# Patient Record
Sex: FEMALE | Race: WHITE | NOT HISPANIC OR LATINO | Employment: OTHER | URBAN - METROPOLITAN AREA
[De-identification: names, ages, dates, MRNs, and addresses within clinical notes are randomized per-mention and may not be internally consistent; named-entity substitution may affect disease eponyms.]

---

## 2017-01-19 PROBLEM — D64.9 ANEMIA: Status: ACTIVE | Noted: 2017-01-19

## 2017-01-24 ENCOUNTER — LAB VISIT (OUTPATIENT)
Dept: LAB | Facility: HOSPITAL | Age: 67
End: 2017-01-24
Attending: INTERNAL MEDICINE
Payer: MEDICARE

## 2017-01-24 DIAGNOSIS — D86.9 SARCOIDOSIS: Chronic | ICD-10-CM

## 2017-01-24 LAB
ALBUMIN SERPL BCP-MCNC: 3.7 G/DL
ALP SERPL-CCNC: 91 U/L
ALT SERPL W/O P-5'-P-CCNC: 16 U/L
ANION GAP SERPL CALC-SCNC: 12 MMOL/L
AST SERPL-CCNC: 19 U/L
BASOPHILS # BLD AUTO: 0.03 K/UL
BASOPHILS NFR BLD: 0.4 %
BILIRUB SERPL-MCNC: 0.9 MG/DL
BUN SERPL-MCNC: 25 MG/DL
CALCIUM SERPL-MCNC: 9.8 MG/DL
CHLORIDE SERPL-SCNC: 105 MMOL/L
CO2 SERPL-SCNC: 28 MMOL/L
CREAT SERPL-MCNC: 0.9 MG/DL
CRP SERPL-MCNC: 22.3 MG/L
DIFFERENTIAL METHOD: ABNORMAL
EOSINOPHIL # BLD AUTO: 0.2 K/UL
EOSINOPHIL NFR BLD: 3.4 %
ERYTHROCYTE [DISTWIDTH] IN BLOOD BY AUTOMATED COUNT: 14.4 %
ERYTHROCYTE [SEDIMENTATION RATE] IN BLOOD BY WESTERGREN METHOD: 24 MM/HR
EST. GFR  (AFRICAN AMERICAN): >60 ML/MIN/1.73 M^2
EST. GFR  (NON AFRICAN AMERICAN): >60 ML/MIN/1.73 M^2
GLUCOSE SERPL-MCNC: 98 MG/DL
HCT VFR BLD AUTO: 33.9 %
HGB BLD-MCNC: 11.5 G/DL
LYMPHOCYTES # BLD AUTO: 0.9 K/UL
LYMPHOCYTES NFR BLD: 13.2 %
MCH RBC QN AUTO: 33.1 PG
MCHC RBC AUTO-ENTMCNC: 33.9 %
MCV RBC AUTO: 98 FL
MONOCYTES # BLD AUTO: 0.5 K/UL
MONOCYTES NFR BLD: 7.2 %
NEUTROPHILS # BLD AUTO: 5.2 K/UL
NEUTROPHILS NFR BLD: 75.8 %
PLATELET # BLD AUTO: 194 K/UL
PMV BLD AUTO: 8.8 FL
POTASSIUM SERPL-SCNC: 3.8 MMOL/L
PROT SERPL-MCNC: 6.8 G/DL
RBC # BLD AUTO: 3.47 M/UL
SODIUM SERPL-SCNC: 145 MMOL/L
WBC # BLD AUTO: 6.84 K/UL

## 2017-01-24 PROCEDURE — 86140 C-REACTIVE PROTEIN: CPT

## 2017-01-24 PROCEDURE — 36415 COLL VENOUS BLD VENIPUNCTURE: CPT | Mod: PO

## 2017-01-24 PROCEDURE — 80053 COMPREHEN METABOLIC PANEL: CPT | Mod: PO

## 2017-01-24 PROCEDURE — 82164 ANGIOTENSIN I ENZYME TEST: CPT

## 2017-01-24 PROCEDURE — 85651 RBC SED RATE NONAUTOMATED: CPT | Mod: PO

## 2017-01-24 PROCEDURE — 85025 COMPLETE CBC W/AUTO DIFF WBC: CPT | Mod: PO

## 2017-01-25 LAB — ACE SERPL-CCNC: 62 U/L

## 2017-01-26 RX ORDER — PREDNISONE 5 MG/1
TABLET ORAL
Qty: 120 TABLET | Refills: 3 | Status: SHIPPED | OUTPATIENT
Start: 2017-01-26 | End: 2017-01-31 | Stop reason: SDUPTHER

## 2017-01-31 ENCOUNTER — OFFICE VISIT (OUTPATIENT)
Dept: RHEUMATOLOGY | Facility: CLINIC | Age: 67
End: 2017-01-31
Payer: MEDICARE

## 2017-01-31 VITALS
HEIGHT: 64 IN | HEART RATE: 77 BPM | BODY MASS INDEX: 35.64 KG/M2 | WEIGHT: 208.75 LBS | SYSTOLIC BLOOD PRESSURE: 131 MMHG | DIASTOLIC BLOOD PRESSURE: 70 MMHG

## 2017-01-31 DIAGNOSIS — Z79.52 LONG TERM CURRENT USE OF SYSTEMIC STEROIDS: Chronic | ICD-10-CM

## 2017-01-31 DIAGNOSIS — D84.821 IMMUNOCOMPROMISED DUE TO CORTICOSTEROIDS: Chronic | ICD-10-CM

## 2017-01-31 DIAGNOSIS — T38.0X5A IMMUNOCOMPROMISED DUE TO CORTICOSTEROIDS: Chronic | ICD-10-CM

## 2017-01-31 DIAGNOSIS — Z79.52 IMMUNOCOMPROMISED DUE TO CORTICOSTEROIDS: Chronic | ICD-10-CM

## 2017-01-31 DIAGNOSIS — M15.9 PRIMARY OSTEOARTHRITIS INVOLVING MULTIPLE JOINTS: ICD-10-CM

## 2017-01-31 DIAGNOSIS — D86.9 SARCOID: Primary | Chronic | ICD-10-CM

## 2017-01-31 DIAGNOSIS — M81.0 OSTEOPOROSIS, POSTMENOPAUSAL: Chronic | ICD-10-CM

## 2017-01-31 PROCEDURE — 99214 OFFICE O/P EST MOD 30 MIN: CPT | Mod: S$GLB,,, | Performed by: PHYSICIAN ASSISTANT

## 2017-01-31 PROCEDURE — 99999 PR PBB SHADOW E&M-EST. PATIENT-LVL IV: CPT | Mod: PBBFAC,,, | Performed by: PHYSICIAN ASSISTANT

## 2017-01-31 PROCEDURE — 96372 THER/PROPH/DIAG INJ SC/IM: CPT | Mod: S$GLB,,, | Performed by: INTERNAL MEDICINE

## 2017-01-31 RX ORDER — HYDROXYCHLOROQUINE SULFATE 200 MG/1
200 TABLET, FILM COATED ORAL 2 TIMES DAILY
Qty: 60 TABLET | Refills: 4 | Status: SHIPPED | OUTPATIENT
Start: 2017-01-31 | End: 2017-06-15 | Stop reason: SDUPTHER

## 2017-01-31 RX ORDER — DOXYCYCLINE 100 MG/1
100 CAPSULE ORAL EVERY 12 HOURS
Qty: 60 CAPSULE | Refills: 6 | Status: SHIPPED | OUTPATIENT
Start: 2017-01-31 | End: 2017-06-15

## 2017-01-31 RX ORDER — METHOTREXATE 2.5 MG/1
20 TABLET ORAL
Qty: 32 TABLET | Refills: 6 | Status: SHIPPED | OUTPATIENT
Start: 2017-01-31 | End: 2017-06-15

## 2017-01-31 NOTE — PROGRESS NOTES
Subjective:       Patient ID: Cori Avalos is a 66 y.o. female.    Chief Complaint: Sarcoidosis    HPI Comments: Cori is back today for rheumatology follow-up.  She has sarcoidosis with mild lung and cutaneous involvement (DX by skin biopsy initially). In 2013 she had a sarcoidosis flare up with elevated inflammatory parameters, elevated ACE level increase joint pain and swelling as well as increased nodules in her skin. mtx was started and she did better. Her dose was stable at 15 mg weekly. She was also on Plaquenil.  She went for evaluation last year at [facility] to see if there was anything else new or different for sarcoidosis treatment.  They discontinued her Plaquenil and increase her methotrexate to 20 mg once weekly and added minocycline 100 mg twice a day.  Minocycline causes dizziness she was intolerant of this.  She resumed Plaquenil but the dose of 100 mg twice daily.  Reports still having nodules under her skin that are popping up here and there.  Pain level 2/10 lower back only.  Had left knee replacement about 2 months ago.  Knee healing well.  She does get some intermittent swelling.  Had full cardiology clearance prior to surgery.  She does see pulmonology has obstructive sleep apnea on CPAP.  Reports a little bit more shortness of breath since the surgery.  She doesn't have any issues with lower extremity edema, no paroxysmal nocturnal dyspnea, no syncopal nurse couple episodes.  She doesn't have a productive cough.       Osteoporosis fractures in the past, failed bisphosphonate's, treated forteo X 24 months. Now on PROLIA every 6 months since 2013. Prolia due today, no issues with prolia in the past. Repeat dexa 5/21/15 falling bmd at spine. Since last visit fall sustained right distal radial fx and L4 compression fx requiring kyphoplasty. Seeing pain management. On oxycodone prn for pain.         Sarcoidosis   Associated symptoms include fatigue, a rash and weakness. Pertinent negatives  "include no abdominal pain, arthralgias, chest pain, chills, fever, joint swelling, myalgias, nausea, neck pain or vomiting.     Review of Systems   Constitutional: Positive for fatigue. Negative for activity change, appetite change, chills and fever.   HENT: Negative.  Negative for mouth sores and trouble swallowing.         No dry mouth   Eyes: Negative.  Negative for photophobia, pain and redness.        No swollen or red eyes, no dry eye     Respiratory: Positive for shortness of breath. Negative for chest tightness, wheezing and stridor.    Cardiovascular: Negative.  Negative for chest pain.   Gastrointestinal: Negative.  Negative for abdominal pain, blood in stool, diarrhea, nausea and vomiting.   Genitourinary: Negative.  Negative for dysuria, frequency, hematuria and urgency.   Musculoskeletal: Positive for back pain and gait problem. Negative for arthralgias, joint swelling, myalgias, neck pain and neck stiffness.   Skin: Positive for rash. Negative for color change and pallor.   Neurological: Positive for dizziness and weakness.   Hematological: Negative for adenopathy.   Psychiatric/Behavioral: Negative for suicidal ideas.         Objective:     Visit Vitals    /70    Pulse 77    Ht 5' 4" (1.626 m)    Wt 94.7 kg (208 lb 12.4 oz)    BMI 35.84 kg/m2        Physical Exam   Constitutional: She is oriented to person, place, and time and well-developed, well-nourished, and in no distress. No distress.   HENT:   Head: Normocephalic and atraumatic.   Right Ear: External ear normal.   Left Ear: External ear normal.   Mouth/Throat: No oropharyngeal exudate.   Eyes: Conjunctivae and EOM are normal. Pupils are equal, round, and reactive to light. No scleral icterus.   Neck: Normal range of motion. Neck supple. No thyromegaly present.   Cardiovascular: Normal rate, regular rhythm and normal heart sounds.    No murmur heard.  Pulmonary/Chest: Effort normal and breath sounds normal. She exhibits no tenderness. "   Abdominal: Soft. Bowel sounds are normal.       Right Side Rheumatological Exam     Examination finds the shoulder, elbow, wrist, knee, 1st PIP, 1st MCP, 2nd PIP, 2nd MCP, 3rd PIP, 3rd MCP, 4th PIP, 4th MCP, 5th PIP and 5th MCP normal.    Muscle Strength (0-5 scale):  Deltoid:  5  Biceps: 5/5   Triceps:  5  : 5/5   Iliopsoas: 4.6  Quadriceps:  4.8   Distal Lower Extremity: 4.8    Left Side Rheumatological Exam     Examination finds the shoulder, elbow, wrist, 1st PIP, 1st MCP, 2nd PIP, 3rd PIP, 3rd MCP, 4th PIP, 4th MCP, 5th PIP and 5th MCP normal.    The patient is tender to palpation of the knee.    She has swelling of the knee    Muscle Strength (0-5 scale):  Deltoid:  5  Biceps: 5/5   Triceps:  5  :  5/5   Iliopsoas: 4.2  Quadriceps:  4.8   Distal Lower Extremity: 4.8      Lymphadenopathy:     She has no cervical adenopathy.   Neurological: She is alert and oriented to person, place, and time. She displays normal reflexes. No cranial nerve deficit. She exhibits normal muscle tone. Gait normal.   Skin: Skin is warm and dry. No rash noted.     Multiple subcutaneous nodules on her skin bilateral arms, abdomen and legs   Musculoskeletal: Normal range of motion. She exhibits edema and tenderness.                  Recent Results (from the past 336 hour(s))   Comprehensive metabolic panel    Collection Time: 01/19/17 10:20 AM   Result Value Ref Range    Sodium 142 136 - 145 mmol/L    Potassium 3.5 3.5 - 5.1 mmol/L    Chloride 100 95 - 110 mmol/L    CO2 32 (H) 22 - 31 mmol/L    Glucose 88 70 - 110 mg/dL    BUN, Bld 14 7 - 18 mg/dL    Creatinine 0.88 0.50 - 1.40 mg/dL    Calcium 9.8 8.4 - 10.2 mg/dL    Total Protein 6.9 6.0 - 8.4 g/dL    Albumin 4.1 3.5 - 5.2 g/dL    Total Bilirubin 0.9 0.2 - 1.3 mg/dL    Alkaline Phosphatase 92 38 - 145 U/L    AST 28 14 - 36 U/L    ALT 36 10 - 44 U/L    Anion Gap 10 8 - 16 mmol/L    eGFR if African American >60 >60 mL/min/1.73 m^2    eGFR if non African American >60 >60  mL/min/1.73 m^2   CBC auto differential    Collection Time: 01/19/17 10:20 AM   Result Value Ref Range    WBC 6.04 3.90 - 12.70 K/uL    RBC 3.46 (L) 4.00 - 5.40 M/uL    Hemoglobin 11.2 (L) 12.0 - 16.0 g/dL    Hematocrit 35.4 (L) 37.0 - 48.5 %     (H) 82 - 98 fL    MCH 32.4 (H) 27.0 - 31.0 pg    MCHC 31.6 (L) 32.0 - 36.0 %    RDW 13.9 11.5 - 14.5 %    Platelets 192 150 - 350 K/uL    MPV 10.1 9.2 - 12.9 fL    Gran # 3.7 1.8 - 7.7 K/uL    Lymph # 1.6 1.0 - 4.8 K/uL    Mono # 0.5 0.3 - 1.0 K/uL    Eos # 0.2 0.0 - 0.5 K/uL    Baso # 0.03 0.00 - 0.20 K/uL    nRBC 0 0 /100 WBC    Gran% 61.1 38.0 - 73.0 %    Lymph% 26.5 18.0 - 48.0 %    Mono% 8.1 4.0 - 15.0 %    Eosinophil% 3.8 0.0 - 8.0 %    Basophil% 0.5 0.0 - 1.9 %    Differential Method Automated    Iron and TIBC    Collection Time: 01/19/17 10:20 AM   Result Value Ref Range    Iron 86 30 - 160 ug/dL    TIBC 361 265 - 497 ug/dL    Iron Saturation 24 20 - 50 %   Vitamin B12    Collection Time: 01/19/17 10:20 AM   Result Value Ref Range    Vitamin B-12 682 210 - 950 pg/mL   Urine culture    Collection Time: 01/19/17 10:39 AM   Result Value Ref Range    Urine Culture, Routine       Multiple organisms isolated. None in predominance.  Repeat if    Urine Culture, Routine clinically necessary.    CBC auto differential    Collection Time: 01/24/17 10:41 AM   Result Value Ref Range    WBC 6.84 3.90 - 12.70 K/uL    RBC 3.47 (L) 4.00 - 5.40 M/uL    Hemoglobin 11.5 (L) 12.0 - 16.0 g/dL    Hematocrit 33.9 (L) 37.0 - 48.5 %    MCV 98 82 - 98 fL    MCH 33.1 (H) 27.0 - 31.0 pg    MCHC 33.9 32.0 - 36.0 %    RDW 14.4 11.5 - 14.5 %    Platelets 194 150 - 350 K/uL    MPV 8.8 (L) 9.2 - 12.9 fL    Gran # 5.2 1.8 - 7.7 K/uL    Lymph # 0.9 (L) 1.0 - 4.8 K/uL    Mono # 0.5 0.3 - 1.0 K/uL    Eos # 0.2 0.0 - 0.5 K/uL    Baso # 0.03 0.00 - 0.20 K/uL    Gran% 75.8 (H) 38.0 - 73.0 %    Lymph% 13.2 (L) 18.0 - 48.0 %    Mono% 7.2 4.0 - 15.0 %    Eosinophil% 3.4 0.0 - 8.0 %    Basophil% 0.4 0.0  - 1.9 %    Differential Method Automated    Comprehensive metabolic panel    Collection Time: 01/24/17 10:41 AM   Result Value Ref Range    Sodium 145 136 - 145 mmol/L    Potassium 3.8 3.5 - 5.1 mmol/L    Chloride 105 95 - 110 mmol/L    CO2 28 23 - 29 mmol/L    Glucose 98 70 - 110 mg/dL    BUN, Bld 25 (H) 8 - 23 mg/dL    Creatinine 0.9 0.5 - 1.4 mg/dL    Calcium 9.8 8.7 - 10.5 mg/dL    Total Protein 6.8 6.0 - 8.4 g/dL    Albumin 3.7 3.5 - 5.2 g/dL    Total Bilirubin 0.9 0.1 - 1.0 mg/dL    Alkaline Phosphatase 91 55 - 135 U/L    AST 19 10 - 40 U/L    ALT 16 10 - 44 U/L    Anion Gap 12 8 - 16 mmol/L    eGFR if African American >60 >60 mL/min/1.73 m^2    eGFR if non African American >60 >60 mL/min/1.73 m^2   C-reactive protein    Collection Time: 01/24/17 10:41 AM   Result Value Ref Range    CRP 22.3 (H) 0.0 - 8.2 mg/L   Sedimentation rate, manual    Collection Time: 01/24/17 10:41 AM   Result Value Ref Range    Sed Rate 24 (H) 0 - 20 mm/Hr   Angiotensin converting enzyme    Collection Time: 01/24/17 10:41 AM   Result Value Ref Range    Angio Convert Enzyme 62 (H) 8 - 53 U/L         Assessment:       1. Sarcoid    2. Osteoporosis, postmenopausal    3. Long term current use of systemic steroids    4. Immunocompromised due to corticosteroids    5. Primary osteoarthritis involving multiple joints        1.  Sarcoidosis with pulmonary and skin involvement predominantly on methotrexate 20 mg once weekly and Plaquenil 100 mg twice daily ongoing skin nodules -  2.  Status post total left knee arthroscopy doing well  3.  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  Long term steroid use and osteoporosis currently on treatment DEXA up-to-date  7.  Medication monitoring--no toxicity issues with her medication  8.  Chronic immunosuppression-no issues with recurrent infections      Plan:       Keep her prednisone at 5 mg daily for now hold off  cutting the dose back    continue her methotrexate 20 mg once weekly, splitting the dose 4 tablets in the morning and 4 tablets in the evening  Continue her folic acid  Always remember to Hold methotrexate  for signs of infection or for surgery     Try to help increasing nodules we will Increase her Plaquenil to  200 mg bid   Since she was intolerant of minocycline if nodules not better with increased plaquenil consider doxy 100 mg bid in place of minocycine    Okay for Prolia today, continues to 6 months repeat her bone density late summer    Follow-up with her pulmonologist    Return to clinic in 3-4 month intervals with labs including an Ace at next visit we'll check a calcitriol vitamin D ratio as well    Call with any questions, changes, or concerns

## 2017-01-31 NOTE — MR AVS SNAPSHOT
[facility]  [address] [facility] [address] [place] [address]  Phone: [phone]  Fax: [phone]                  Cori Avalos   2017 2:30 PM   Office Visit    Description:  Female : 1950   Provider:  Ariana Quigley PA-C   Department:  [facility] - Rheumatology           Reason for Visit     Sarcoidosis           Diagnoses this Visit        Comments    Sarcoid    -  Primary     Osteoporosis, postmenopausal         Long term current use of systemic steroids         Immunocompromised due to corticosteroids         Primary osteoarthritis involving multiple joints                To Do List           Future Appointments        Provider Department Dept Phone    2017 11:30 AM LAB, [place] [facility] [phone]    2017 10:30 AM Ariana Quigley PA-C [facility] [phone]    2017 10:40 AM LAB, [place] [facility] [phone]    2017 11:00 AM Keith Balderrama MD [facility] [phone]      Goals (5 Years of Data)     None       These Medications        Disp Refills Start End    methotrexate 2.5 MG Tab 32 tablet 6 2017    Take 8 tablets (20 mg total) by mouth every 7 days. - Oral    Pharmacy: [facility] [address] Ph #: [phone]       doxycycline (VIBRAMYCIN) 100 MG Cap 60 capsule 6 2017     Take 1 capsule (100 mg total) by mouth every 12 (twelve) hours. - Oral    Pharmacy: [facility] [address] Ph #: [phone]       hydroxychloroquine (PLAQUENIL) 200 mg tablet 60 tablet 4 2017     Take 1 tablet (200 mg total) by mouth 2 (two) times daily. - Oral    Pharmacy: [facility] [address] Ph #: [phone]         [facility]     [facility] Nurse Care Line -  Assistance  Registered nurses in the [facility] provide clinical advisement,  health education, appointment booking, and other advisory services.  Call for this free service at [phone].             Medications           Message regarding Medications     Verify the changes and/or additions to your medication regime listed below are the same as discussed with your clinician today.  If any of these changes or additions are incorrect, please notify your healthcare provider.        START taking these NEW medications        Refills    methotrexate 2.5 MG Tab 6    Sig: Take 8 tablets (20 mg total) by mouth every 7 days.    Class: Normal    Route: Oral    doxycycline (VIBRAMYCIN) 100 MG Cap 6    Sig: Take 1 capsule (100 mg total) by mouth every 12 (twelve) hours.    Class: Normal    Route: Oral    hydroxychloroquine (PLAQUENIL) 200 mg tablet 4    Sig: Take 1 tablet (200 mg total) by mouth 2 (two) times daily.    Class: Normal    Route: Oral      STOP taking these medications     ropinirole (REQUIP) 5 MG tablet Take 5 mg by mouth every evening.    doxycycline (VIBRA-TABS) 100 MG tablet Take 1 tablet (100 mg total) by mouth 2 (two) times daily.           Verify that the below list of medications is an accurate representation of the medications you are currently taking.  If none reported, the list may be blank. If incorrect, please contact your healthcare provider. Carry this list with you in case of emergency.           Current Medications     amitriptyline (ELAVIL) 10 MG tablet Take 10 mg by mouth every evening.     DENOSUMAB (PROLIA SUBQ) Inject 60 mg into the skin As instructed (every 6 months).     dextromethorphan-guaifenesin  mg (MUCINEX DM)  mg per 12 hr tablet Take 1 tablet by mouth 2 (two) times daily as needed.    diazepam (VALIUM) 10 MG Tab Take 10 mg by mouth every evening.    duloxetine (CYMBALTA) 60 MG capsule Take 60 mg by mouth once daily.    esomeprazole (NEXIUM) 40 MG capsule Take 40 mg by mouth before breakfast.    flunisolide 25 mcg, 0.025%, (NASALIDE) 25 mcg  "(0.025 %) Spry 2 sprays by Nasal route once daily.    furosemide (LASIX) 20 MG tablet Take 20 mg by mouth once daily.    hydrocodone-acetaminophen 7.5-325mg (NORCO) 7.5-325 mg per tablet Take 1 tablet by mouth every 4 (four) hours as needed.    me-thfolate gluc-B12-herb #236 (RHEUMATE) 1-1-500 mg Cap Take 1 tablet by mouth once daily.    metoprolol succinate (TOPROL-XL) 100 MG 24 hr tablet Take 100 mg by mouth 2 (two) times daily.     potassium chloride (KLOR-CON) 10 MEQ TbSR Take 20 mEq by mouth once daily.     predniSONE (DELTASONE) 5 MG tablet Take 5 mg by mouth once daily.    ropinirole (REQUIP XL) 4 mg 24 hr tablet TAKE 1 TABLET BY MOUTH ONCE DAILY AT BEDTIME.    tizanidine (ZANAFLEX) 4 MG tablet Take 4 mg by mouth every evening.     warfarin (COUMADIN) 2.5 MG tablet Take 2.5 mg by mouth every Tues, Thurs, Sat.     warfarin (COUMADIN) 5 MG tablet Take 5 mg by mouth every Mon, Wed, Fri.     doxycycline (VIBRAMYCIN) 100 MG Cap Take 1 capsule (100 mg total) by mouth every 12 (twelve) hours.    hydroxychloroquine (PLAQUENIL) 200 mg tablet Take 1 tablet (200 mg total) by mouth 2 (two) times daily.    methotrexate 2.5 MG Tab Take 8 tablets (20 mg total) by mouth every 7 days.           Clinical Reference Information           Vital Signs - Last Recorded  Most recent update: 1/31/2017  2:41 PM by Calista Sam LPN    BP Pulse Ht Wt BMI    131/70 77 5' 4" (1.626 m) 94.7 kg (208 lb 12.4 oz) 35.84 kg/m2      Blood Pressure          Most Recent Value    BP  131/70      Allergies as of 1/31/2017     Neurontin [Gabapentin]    Penicillins    Ceftin [Cefuroxime Axetil]    Dilaudid [Hydromorphone]    Latex, Natural Rubber    Neurontin [Gabapentin]    Ceftin [Cefuroxime Axetil]    Penicillins      Immunizations Administered on Date of Encounter - 1/31/2017     None      Orders Placed During Today's Visit     Recurring Lab Work Interval Expires    C-reactive protein   1/31/2018    CBC auto differential   1/31/2018    " Comprehensive metabolic panel   1/31/2018    Sedimentation rate, manual   1/31/2018    Angiotensin converting enzyme   4/1/2018    Calcitriol   4/1/2018    Urinalysis   4/1/2018    Vitamin D   4/1/2018

## 2017-01-31 NOTE — PROGRESS NOTES
Prolia 60mg/cc to right lower abdomen. Labs checked: Calcium 9.8, Creatinine 0.9. Pt tolerated well. No acute reaction noted to site. Pt instructed on signs and symptoms of reaction to report. Pt verbalized understanding.     Lot:  [id number]  Exp:  4/19

## 2017-02-01 ENCOUNTER — TELEPHONE (OUTPATIENT)
Dept: RHEUMATOLOGY | Facility: CLINIC | Age: 67
End: 2017-02-01

## 2017-02-01 NOTE — TELEPHONE ENCOUNTER
Ariana, pt called and wanted to clarify that she is taking plaquenil bid. So do you want her to start the doxi ? And she said she was getting sarcoid nodules on her spine

## 2017-02-01 NOTE — TELEPHONE ENCOUNTER
What i need clarification is she was on plaquenil 100 mg bid  i changed to 200 mg bid  So as long as she was taking the 100 mg plaquenil then she had a dose adjustment and i would hold off starting the doxy just yet    She was pretty sure she was taking the lower dose plaquenil  Please clarify

## 2017-02-01 NOTE — TELEPHONE ENCOUNTER
----- Message from Lianna Zapata sent at 2/1/2017  8:41 AM CST -----  Contact: pt  Pt calling to speak to nurse....states that she made a mistake when advising of current medication she is on...the patient would like to speak to nurse to get this corrected...please adv/call pt back at [phone]///thx jw

## 2017-02-02 NOTE — TELEPHONE ENCOUNTER
----- Message from Caridad Lopez sent at 2/2/2017  8:26 AM CST -----  Contact: pt  Pt requests nurse to call her regarding medication change. Pt states she left a message yesterday and no one called her back.pt can be reached at [phone].

## 2017-02-09 ENCOUNTER — TELEPHONE (OUTPATIENT)
Dept: RHEUMATOLOGY | Facility: CLINIC | Age: 67
End: 2017-02-09

## 2017-02-09 NOTE — TELEPHONE ENCOUNTER
Spoke with Ms. Cori who states that the Doxycycline is not working due to new nodules appearing on Bilat upper lower extremities, right arm and down her spine.  Pain rate 4/10 only on the right arm. Should she give it some time to work? Please advise.

## 2017-02-09 NOTE — TELEPHONE ENCOUNTER
----- Message from Sole Webster sent at 2/9/2017 11:36 AM CST -----  Contact: pt  Pt states that the new medication is not working...[phone]

## 2017-02-09 NOTE — TELEPHONE ENCOUNTER
Spoke with MsSangeetha Cori and informed her of Ariana Quigley PA-C advisement below. Ms. Persaud states understanding.

## 2017-02-09 NOTE — TELEPHONE ENCOUNTER
We just started the med 1 week ago  Have to give it some time does not work quickly  - take for next 8-12 weeks and will see how doing at next visit in April

## 2017-02-16 DIAGNOSIS — D86.9 SARCOIDOSIS: Chronic | ICD-10-CM

## 2017-02-16 RX ORDER — HYDROXYCHLOROQUINE SULFATE 200 MG/1
TABLET, FILM COATED ORAL
Qty: 60 TABLET | Refills: 4 | Status: SHIPPED | OUTPATIENT
Start: 2017-02-16 | End: 2017-03-29 | Stop reason: SDUPTHER

## 2017-03-16 ENCOUNTER — TELEPHONE (OUTPATIENT)
Dept: RHEUMATOLOGY | Facility: CLINIC | Age: 67
End: 2017-03-16

## 2017-03-16 NOTE — TELEPHONE ENCOUNTER
Would want to see her on a day dr knott here     i am not sure what else to do  Can keep apt with me 4/25 or      see me or luis earlier on day dr knott is here

## 2017-03-16 NOTE — TELEPHONE ENCOUNTER
Ariana, pt called and wants you to know that she is getting more sarcoid lumps. Her next appt is 4/25. Do you want me to move it up

## 2017-03-16 NOTE — TELEPHONE ENCOUNTER
----- Message from Lianna Clark sent at 3/16/2017  1:30 PM CDT -----  Contact: Pt  Pt is requesting to speak to the nurse. Pt states that she has more of the sarcoid lumps. Pt can be reached at [phone].

## 2017-03-17 ENCOUNTER — LAB VISIT (OUTPATIENT)
Dept: LAB | Facility: HOSPITAL | Age: 67
End: 2017-03-17
Attending: INTERNAL MEDICINE
Payer: MEDICARE

## 2017-03-17 DIAGNOSIS — M81.0 OSTEOPOROSIS, POSTMENOPAUSAL: Chronic | ICD-10-CM

## 2017-03-17 DIAGNOSIS — D86.9 SARCOID: Chronic | ICD-10-CM

## 2017-03-17 DIAGNOSIS — Z79.52 LONG TERM CURRENT USE OF SYSTEMIC STEROIDS: Chronic | ICD-10-CM

## 2017-03-17 LAB
25(OH)D3+25(OH)D2 SERPL-MCNC: 30 NG/ML
ALBUMIN SERPL BCP-MCNC: 3.7 G/DL
ALP SERPL-CCNC: 79 U/L
ALT SERPL W/O P-5'-P-CCNC: 20 U/L
ANION GAP SERPL CALC-SCNC: 9 MMOL/L
AST SERPL-CCNC: 18 U/L
BASOPHILS # BLD AUTO: 0.04 K/UL
BASOPHILS NFR BLD: 0.6 %
BILIRUB SERPL-MCNC: 0.7 MG/DL
BUN SERPL-MCNC: 15 MG/DL
CALCIUM SERPL-MCNC: 9 MG/DL
CHLORIDE SERPL-SCNC: 105 MMOL/L
CO2 SERPL-SCNC: 27 MMOL/L
CREAT SERPL-MCNC: 0.8 MG/DL
CRP SERPL-MCNC: 14.2 MG/L
DIFFERENTIAL METHOD: ABNORMAL
EOSINOPHIL # BLD AUTO: 0.1 K/UL
EOSINOPHIL NFR BLD: 1.5 %
ERYTHROCYTE [DISTWIDTH] IN BLOOD BY AUTOMATED COUNT: 14.3 %
ERYTHROCYTE [SEDIMENTATION RATE] IN BLOOD BY WESTERGREN METHOD: 20 MM/HR
EST. GFR  (AFRICAN AMERICAN): >60 ML/MIN/1.73 M^2
EST. GFR  (NON AFRICAN AMERICAN): >60 ML/MIN/1.73 M^2
GLUCOSE SERPL-MCNC: 98 MG/DL
HCT VFR BLD AUTO: 36.1 %
HGB BLD-MCNC: 12.1 G/DL
LYMPHOCYTES # BLD AUTO: 1.3 K/UL
LYMPHOCYTES NFR BLD: 18.2 %
MCH RBC QN AUTO: 32.4 PG
MCHC RBC AUTO-ENTMCNC: 33.5 %
MCV RBC AUTO: 97 FL
MONOCYTES # BLD AUTO: 0.6 K/UL
MONOCYTES NFR BLD: 8.7 %
NEUTROPHILS # BLD AUTO: 5.2 K/UL
NEUTROPHILS NFR BLD: 71 %
PLATELET # BLD AUTO: 230 K/UL
PMV BLD AUTO: 9.8 FL
POTASSIUM SERPL-SCNC: 3.8 MMOL/L
PROT SERPL-MCNC: 6.8 G/DL
RBC # BLD AUTO: 3.74 M/UL
SODIUM SERPL-SCNC: 141 MMOL/L
WBC # BLD AUTO: 7.26 K/UL

## 2017-03-17 PROCEDURE — 36415 COLL VENOUS BLD VENIPUNCTURE: CPT | Mod: PO

## 2017-03-17 PROCEDURE — 80053 COMPREHEN METABOLIC PANEL: CPT | Mod: PO

## 2017-03-17 PROCEDURE — 85651 RBC SED RATE NONAUTOMATED: CPT | Mod: PO

## 2017-03-17 PROCEDURE — 82652 VIT D 1 25-DIHYDROXY: CPT

## 2017-03-17 PROCEDURE — 85025 COMPLETE CBC W/AUTO DIFF WBC: CPT | Mod: PO

## 2017-03-17 PROCEDURE — 86140 C-REACTIVE PROTEIN: CPT

## 2017-03-17 PROCEDURE — 82306 VITAMIN D 25 HYDROXY: CPT

## 2017-03-17 PROCEDURE — 82164 ANGIOTENSIN I ENZYME TEST: CPT

## 2017-03-20 LAB
1,25(OH)2D3 SERPL-MCNC: 69 PG/ML
ACE SERPL-CCNC: 62 U/L

## 2017-03-29 ENCOUNTER — OFFICE VISIT (OUTPATIENT)
Dept: RHEUMATOLOGY | Facility: CLINIC | Age: 67
End: 2017-03-29
Payer: MEDICARE

## 2017-03-29 VITALS
HEART RATE: 65 BPM | BODY MASS INDEX: 35.49 KG/M2 | WEIGHT: 207.88 LBS | SYSTOLIC BLOOD PRESSURE: 138 MMHG | DIASTOLIC BLOOD PRESSURE: 78 MMHG | HEIGHT: 64 IN

## 2017-03-29 DIAGNOSIS — M81.0 OSTEOPOROSIS, POSTMENOPAUSAL: Chronic | ICD-10-CM

## 2017-03-29 DIAGNOSIS — D86.9 SARCOID: Primary | Chronic | ICD-10-CM

## 2017-03-29 DIAGNOSIS — Z79.899 HIGH RISK MEDICATION USE: ICD-10-CM

## 2017-03-29 DIAGNOSIS — D68.51 FACTOR V LEIDEN: Chronic | ICD-10-CM

## 2017-03-29 DIAGNOSIS — Z79.52 LONG TERM CURRENT USE OF SYSTEMIC STEROIDS: Chronic | ICD-10-CM

## 2017-03-29 DIAGNOSIS — M15.9 PRIMARY OSTEOARTHRITIS INVOLVING MULTIPLE JOINTS: ICD-10-CM

## 2017-03-29 DIAGNOSIS — T38.0X5A IMMUNOCOMPROMISED DUE TO CORTICOSTEROIDS: Chronic | ICD-10-CM

## 2017-03-29 DIAGNOSIS — Z79.52 IMMUNOCOMPROMISED DUE TO CORTICOSTEROIDS: Chronic | ICD-10-CM

## 2017-03-29 DIAGNOSIS — D84.821 IMMUNOCOMPROMISED DUE TO CORTICOSTEROIDS: Chronic | ICD-10-CM

## 2017-03-29 DIAGNOSIS — Z79.01 LONG TERM CURRENT USE OF ANTICOAGULANT: Chronic | ICD-10-CM

## 2017-03-29 PROCEDURE — 99214 OFFICE O/P EST MOD 30 MIN: CPT | Mod: S$GLB,,, | Performed by: PHYSICIAN ASSISTANT

## 2017-03-29 PROCEDURE — 99999 PR PBB SHADOW E&M-EST. PATIENT-LVL IV: CPT | Mod: PBBFAC,,, | Performed by: PHYSICIAN ASSISTANT

## 2017-03-29 RX ORDER — ONDANSETRON HYDROCHLORIDE 8 MG/1
9 TABLET, FILM COATED ORAL EVERY 8 HOURS PRN
COMMUNITY
End: 2017-06-15

## 2017-03-29 RX ORDER — DABIGATRAN ETEXILATE MESYLATE 150 MG/1
150 CAPSULE ORAL 2 TIMES DAILY
COMMUNITY
Start: 2017-03-28 | End: 2018-02-19

## 2017-03-29 NOTE — PROGRESS NOTES
Subjective:       Patient ID: Cori Avalos is a 66 y.o. female.    Chief Complaint: sarcoid lumps    HPI Comments: Cori is back today for rheumatology follow-up.  She has sarcoidosis with mild lung and cutaneous involvement (DX by skin biopsy initially). In 2013 she had a sarcoidosis flare up with elevated inflammatory parameters, elevated ACE level increase joint pain and swelling as well as increased nodules in her skin. mtx was started and she did better. Her dose was stable at 15 mg weekly. She was also on Plaquenil.  She went for evaluation last year at [facility] to see if there was anything else new or different for sarcoidosis treatment.  They discontinued her Plaquenil and increase her methotrexate to 20 mg once weekly and added minocycline 100 mg twice a day.  Minocycline causes dizziness she was intolerant of this.  She resumed Plaquenil but the dose of 200 mg twice daily.  Reports still having nodules under her skin that are popping up here and there.  Last visit moved to doxycycline 100 mg bid. Still reporting increasing nodules in her skin. This is causing her to be concerned. No pain Pain level 0/10  She will see her pulmonologist Dr Nolasco in early April.   She has obstructive sleep apnea on CPAP. Reports able to tolerate her exercise fine.     Labs done last week ACE stable at 62, vit D/calcitriol ratii 2:1, cbc, cmp, esr and crp all good. No eye issues. CXR dec 2016 stable. Left lower linear scarring. Stable. Has sarcoid and plaquenil eye screening every 6 months. Breathing is stable. No CP or sob. No PND. Some mild lower ext edema which is chronic. No fevers or other rashes.  no syncopal or near syncopal episodes.  She doesn't have a productive cough.      Had left knee replacement about 3 months ago.  Knee healing well.  She does get some intermittent swelling.  Had full cardiology clearance prior to surgery.      Osteoporosis fractures in the past, failed bisphosphonate's, treated forteo X  "24 months. Now on PROLIA every 6 months since 2013.  Repeat dexa 5/21/15 falling bmd at spine. Last year sustained right distal radial fx and L4 compression fx requiring kyphoplasty.  Last prolia 1/30/17 due again after 7/30/17. No issues with injections. Will need repeat dexa next visit       Sarcoidosis   Associated symptoms include fatigue and a rash. Pertinent negatives include no abdominal pain, arthralgias, chest pain, chills, fever, joint swelling, myalgias, nausea, neck pain, vomiting or weakness.     Review of Systems   Constitutional: Positive for fatigue. Negative for activity change, appetite change, chills and fever.   HENT: Negative.  Negative for mouth sores and trouble swallowing.         No dry mouth   Eyes: Negative.  Negative for photophobia, pain and redness.        No swollen or red eyes, no dry eye     Respiratory: Positive for shortness of breath. Negative for chest tightness, wheezing and stridor.         Stable  Able to exercise regularly    Cardiovascular: Negative.  Negative for chest pain.   Gastrointestinal: Negative.  Negative for abdominal pain, blood in stool, diarrhea, nausea and vomiting.   Genitourinary: Negative.  Negative for dysuria, frequency, hematuria and urgency.   Musculoskeletal: Negative for arthralgias, back pain, gait problem, joint swelling, myalgias, neck pain and neck stiffness.   Skin: Positive for rash. Negative for color change and pallor.   Neurological: Positive for dizziness. Negative for weakness.   Hematological: Negative for adenopathy.   Psychiatric/Behavioral: Negative for suicidal ideas.         Objective:     /78  Pulse 65  Ht 5' 4" (1.626 m)  Wt 94.3 kg (207 lb 14.3 oz)  BMI 35.68 kg/m2     Physical Exam   Constitutional: She is oriented to person, place, and time and well-developed, well-nourished, and in no distress. No distress.   HENT:   Head: Normocephalic and atraumatic.   Right Ear: External ear normal.   Left Ear: External ear normal. "   Mouth/Throat: No oropharyngeal exudate.   Eyes: Conjunctivae and EOM are normal. Pupils are equal, round, and reactive to light. No scleral icterus.   Neck: Normal range of motion. Neck supple. No thyromegaly present.   Cardiovascular: Normal rate, regular rhythm and normal heart sounds.    No murmur heard.  Pulmonary/Chest: Effort normal and breath sounds normal. She exhibits no tenderness.   Abdominal: Soft. Bowel sounds are normal.       Right Side Rheumatological Exam     Examination finds the shoulder, elbow, wrist, knee, 1st PIP, 1st MCP, 2nd PIP, 2nd MCP, 3rd PIP, 3rd MCP, 4th PIP, 4th MCP, 5th PIP and 5th MCP normal.    Muscle Strength (0-5 scale):  Deltoid:  5  Biceps: 5/5   Triceps:  5  : 5/5   Iliopsoas: 4.6  Quadriceps:  4.8   Distal Lower Extremity: 4.8    Left Side Rheumatological Exam     Examination finds the shoulder, elbow, wrist, 1st PIP, 1st MCP, 2nd PIP, 3rd PIP, 3rd MCP, 4th PIP, 4th MCP, 5th PIP and 5th MCP normal.    The patient is tender to palpation of the knee.    She has swelling of the knee    Muscle Strength (0-5 scale):  Deltoid:  5  Biceps: 5/5   Triceps:  5  :  5/5   Iliopsoas: 4.2  Quadriceps:  4.8   Distal Lower Extremity: 4.8      Lymphadenopathy:     She has no cervical adenopathy.   Neurological: She is alert and oriented to person, place, and time. She displays normal reflexes. No cranial nerve deficit. She exhibits normal muscle tone. Gait normal.   Skin: Skin is warm and dry. Rash noted. Rash is nodular.     Multiple subcutaneous nodules on her skin bilateral arms, abdomen and legs   Musculoskeletal: Normal range of motion. She exhibits edema and tenderness.                  Recent Results (from the past 336 hour(s))   Urinalysis    Collection Time: 03/17/17 12:50 PM   Result Value Ref Range    Specimen UA Urine, Clean Catch     Color, UA Yellow Yellow, Straw, Mignon    Appearance, UA Clear Clear    pH, UA 6.0 5.0 - 8.0    Specific Gravity, UA 1.015 1.005 - 1.030     Protein, UA Negative Negative    Glucose, UA Negative Negative    Ketones, UA Negative Negative    Bilirubin (UA) Negative Negative    Occult Blood UA Negative Negative    Nitrite, UA Negative Negative    Leukocytes, UA Negative Negative   CBC auto differential    Collection Time: 03/17/17  1:02 PM   Result Value Ref Range    WBC 7.26 3.90 - 12.70 K/uL    RBC 3.74 (L) 4.00 - 5.40 M/uL    Hemoglobin 12.1 12.0 - 16.0 g/dL    Hematocrit 36.1 (L) 37.0 - 48.5 %    MCV 97 82 - 98 fL    MCH 32.4 (H) 27.0 - 31.0 pg    MCHC 33.5 32.0 - 36.0 %    RDW 14.3 11.5 - 14.5 %    Platelets 230 150 - 350 K/uL    MPV 9.8 9.2 - 12.9 fL    Gran # 5.2 1.8 - 7.7 K/uL    Lymph # 1.3 1.0 - 4.8 K/uL    Mono # 0.6 0.3 - 1.0 K/uL    Eos # 0.1 0.0 - 0.5 K/uL    Baso # 0.04 0.00 - 0.20 K/uL    Gran% 71.0 38.0 - 73.0 %    Lymph% 18.2 18.0 - 48.0 %    Mono% 8.7 4.0 - 15.0 %    Eosinophil% 1.5 0.0 - 8.0 %    Basophil% 0.6 0.0 - 1.9 %    Differential Method Automated    Comprehensive metabolic panel    Collection Time: 03/17/17  1:02 PM   Result Value Ref Range    Sodium 141 136 - 145 mmol/L    Potassium 3.8 3.5 - 5.1 mmol/L    Chloride 105 95 - 110 mmol/L    CO2 27 23 - 29 mmol/L    Glucose 98 70 - 110 mg/dL    BUN, Bld 15 8 - 23 mg/dL    Creatinine 0.8 0.5 - 1.4 mg/dL    Calcium 9.0 8.7 - 10.5 mg/dL    Total Protein 6.8 6.0 - 8.4 g/dL    Albumin 3.7 3.5 - 5.2 g/dL    Total Bilirubin 0.7 0.1 - 1.0 mg/dL    Alkaline Phosphatase 79 55 - 135 U/L    AST 18 10 - 40 U/L    ALT 20 10 - 44 U/L    Anion Gap 9 8 - 16 mmol/L    eGFR if African American >60 >60 mL/min/1.73 m^2    eGFR if non African American >60 >60 mL/min/1.73 m^2   C-reactive protein    Collection Time: 03/17/17  1:02 PM   Result Value Ref Range    CRP 14.2 (H) 0.0 - 8.2 mg/L   Sedimentation rate, manual    Collection Time: 03/17/17  1:02 PM   Result Value Ref Range    Sed Rate 20 0 - 20 mm/Hr   Angiotensin converting enzyme    Collection Time: 03/17/17  1:02 PM   Result Value Ref Range     Angio Convert Enzyme 62 (H) 8 - 53 U/L   Vitamin D    Collection Time: 03/17/17  1:02 PM   Result Value Ref Range    Vit D, 25-Hydroxy 30 30 - 96 ng/mL   Calcitriol    Collection Time: 03/17/17  1:02 PM   Result Value Ref Range    Vit D, 1,25-Dihydroxy 69 20 - 79 pg/mL     DEXA TF -1.8, FN -2.5, spine -2.3- osteoporosis with falling bmd     Assessment:       1. Sarcoid    2. Osteoporosis, postmenopausal    3. 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 Long term current use of systemic steroids    5. Primary osteoarthritis involving multiple joints    6. High risk medication use    7. Factor V Leiden    8. Long term current use of anticoagulant        1.  Sarcoidosis with pulmonary and skin involvement predominantly on methotrexate 20 mg once weekly, doxy 100 mg bid and Plaquenil 200 mg twice daily--  ongoing skin nodules but no progressive end organ damade-  2.  Status post total left knee arthroscopy doing well  3.  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  Long term steroid use and osteoporosis currently on treatment with prolia DEXA last done 5/2015 due again next visit   7.  Medication monitoring--no toxicity issues with her medication  8.  Chronic immunosuppression-no issues with recurrent infections  9. Osteoporosis in setting of long term steroid use- on prolia last done 1/30/17 due again early august with repeat dexa due     Plan:         Get her to derm to have a few of the nodules biopsied to see if sarcoid related or some other pathology, make sure to send us copy of biopsy report    Keep her prednisone at 5 mg daily for now hold off cutting the dose back    continue her methotrexate 20 mg once weekly, splitting the dose 4 tablets in the morning and 4 tablets in the evening, Continue her folic acid  Always remember to Hold methotrexate  for signs of infection or for surgery   Keep her Plaquenil to  200 mg bid and doxy  100 mg bid     Reassurance see nothing dangerous related to sarcoid activity    Follow up with pulmonary next month, send us copy of clinic note    Continue Q 6 month eye checks for plaquenil and sarcoid check     Okay for Prolia today, continues to 6 months repeat her bone density late summer    Return to clinic as scheduled in august with labs, prolia and dexa    Call with any questions, changes, or concerns

## 2017-03-29 NOTE — MR AVS SNAPSHOT
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         Long term current use of systemic steroids         Primary osteoarthritis involving multiple joints         High risk medication use         Factor V Leiden         Long term current use of anticoagulant                To Do List           Future Appointments        Provider Department Dept Phone    2017 10:35 AM URINE [facility] [phone]    2017 10:40 AM LAB, BARBARA [facility] [phone]    2017 10:30 AM [facility] BMD1 [facility] [phone]    2017 11:00 AM Keith Balderrama MD [facility] [phone]      Goals (5 Years of Data)     None      [facility]     [facility] Care [facility] -  Assistance  Registered nurses in the [facility] provide clinical advisement, health education, appointment booking, and other advisory services.  Call for this free service at [phone].             Medications           Message regarding Medications     Verify the changes and/or additions to your medication regime listed below are the same as discussed with your clinician today.  If any of these changes or additions are incorrect, please notify your healthcare provider.        STOP taking these medications     warfarin (COUMADIN) 2.5 MG tablet Take 2.5 mg by mouth every Tues, Thurs, Sat.     warfarin (COUMADIN) 5 MG tablet Take 5 mg by mouth every Mon, Wed, Fri.     DENOSUMAB (PROLIA SUBQ) Inject 60 mg into the skin As  instructed (every 6 months).            Verify that the below list of medications is an accurate representation of the medications you are currently taking.  If none reported, the list may be blank. If incorrect, please contact your healthcare provider. Carry this list with you in case of emergency.           Current Medications     amitriptyline (ELAVIL) 10 MG tablet Take 10 mg by mouth every evening.     dextromethorphan-guaifenesin  mg (MUCINEX DM)  mg per 12 hr tablet Take 1 tablet by mouth 2 (two) times daily as needed.    diazepam (VALIUM) 10 MG Tab Take 10 mg by mouth every evening.    DIPHENHYDRAMINE HCL (BENADRYL ALLERGY ORAL) Take by mouth.    doxycycline (VIBRAMYCIN) 100 MG Cap Take 1 capsule (100 mg total) by mouth every 12 (twelve) hours.    duloxetine (CYMBALTA) 60 MG capsule Take 60 mg by mouth once daily.    esomeprazole (NEXIUM) 40 MG capsule Take 40 mg by mouth before breakfast.    flunisolide 25 mcg, 0.025%, (NASALIDE) 25 mcg (0.025 %) Spry 2 sprays by Nasal route once daily.    furosemide (LASIX) 20 MG tablet Take 20 mg by mouth once daily.    hydrocodone-acetaminophen 7.5-325mg (NORCO) 7.5-325 mg per tablet Take 1 tablet by mouth every 4 (four) hours as needed.    hydroxychloroquine (PLAQUENIL) 200 mg tablet Take 1 tablet (200 mg total) by mouth 2 (two) times daily.    me-thfolate gluc-B12-herb #236 (RHEUMATE) 1-1-500 mg Cap Take 1 tablet by mouth once daily.    methotrexate 2.5 MG Tab Take 8 tablets (20 mg total) by mouth every 7 days.    metoprolol succinate (TOPROL-XL) 100 MG 24 hr tablet Take 100 mg by mouth 2 (two) times daily.     ondansetron (ZOFRAN) 8 MG tablet Take 9 mg by mouth every 8 (eight) hours as needed for Nausea.    potassium chloride (KLOR-CON) 10 MEQ TbSR Take 20 mEq by mouth once daily.     PRADAXA 150 mg Cap 150 mg 2 (two) times daily.    predniSONE (DELTASONE) 5 MG tablet Take 5 mg by mouth once daily.    ropinirole (REQUIP XL) 4 mg 24 hr tablet TAKE 1 TABLET  "BY MOUTH ONCE DAILY AT BEDTIME.    tizanidine (ZANAFLEX) 4 MG tablet Take 4 mg by mouth every evening.            Clinical Reference Information           Your Vitals Were     BP Pulse Height Weight BMI    138/78 65 5' 4" (1.626 m) 94.3 kg (207 lb 14.3 oz) 35.68 kg/m2      Blood Pressure          Most Recent Value    BP  138/78      Allergies as of 3/29/2017     Neurontin [Gabapentin]    Penicillins    Ceftin [Cefuroxime Axetil]    Dilaudid [Hydromorphone]    Latex, Natural Rubber    Neurontin [Gabapentin]    Ceftin [Cefuroxime Axetil]    Penicillins      Immunizations Administered on Date of Encounter - 3/29/2017     None      Orders Placed During Today's Visit      Normal Orders This Visit    Prior Authorization Order     Future Labs/Procedures Expected by Expires    DXA Bone Density Spine And Hip_Axial Skeleton  3/29/2017 3/29/2018      Language Assistance Services     ATTENTION: Language assistance services are available, free of charge. Please call [phone].      ATENCIÓN: Si habla erica, tiene a dunham disposición servicios gratuitos de asistencia lingüística. Llame al [phone].     CHÚ Ý: N?u b?n nói Ti?ng Vi?t, có các d?ch v? h? tr? ngôn ng? mi?n phí dành cho b?n. G?i s? [phone].         [facility] complies with applicable Federal civil rights laws and does not discriminate on the basis of race, color, national origin, age, disability, or sex.        "

## 2017-05-16 ENCOUNTER — TELEPHONE (OUTPATIENT)
Dept: RHEUMATOLOGY | Facility: CLINIC | Age: 67
End: 2017-05-16

## 2017-05-16 NOTE — TELEPHONE ENCOUNTER
----- Message from Sudarshan Cordova sent at 5/16/2017  8:58 AM CDT -----  Pt is requesting a call from nurse to discuss and review lab results.            Please call pt back at [phone]

## 2017-05-22 ENCOUNTER — TELEPHONE (OUTPATIENT)
Dept: RHEUMATOLOGY | Facility: CLINIC | Age: 67
End: 2017-05-22

## 2017-05-22 NOTE — TELEPHONE ENCOUNTER
----- Message from Suzi Ricci sent at 5/19/2017  1:21 PM CDT -----  needs callback rg discussion had earlier this week...[phone] (home)

## 2017-05-22 NOTE — TELEPHONE ENCOUNTER
Spoke with Ms. Persaud who states that she stopped the doxycycline due the yeast infections. She also states she had a biopsy done that shows she does not have Sarcoidosis. Biopsy report was given to Dr. Balderrama last week for review. Ms. Persaud will be notified of results once reviewed. Please advise.

## 2017-05-23 NOTE — TELEPHONE ENCOUNTER
Spoke with Sangeetha Cori and informed her that Dr. Balderrama reviewed her Biopsy report which did not show connective tissue disease and that stopping the doxycycline was ok and will discuss further treatment during follow up appointment on 08/14/2017. Mrs. Persaud states understanding.

## 2017-06-05 ENCOUNTER — TELEPHONE (OUTPATIENT)
Dept: RHEUMATOLOGY | Facility: CLINIC | Age: 67
End: 2017-06-05

## 2017-06-05 NOTE — TELEPHONE ENCOUNTER
"Spoke with Ms. Persaud who states she took her MTX today and she is feeling Dizzy, sleepy, and "out of it".  She states she has been on this for over a year and this is the first time she noticed this effect but her  states that she has noticed the effects before. She takes 20mg of MTX, 4 tablets in the morning and 4 tablets at night. Appointment scheduled for 06/15/2017 with Ariana Quigley PA-C at 8am. Please advise if needed.   "

## 2017-06-05 NOTE — TELEPHONE ENCOUNTER
----- Message from Ruth Kuo sent at 6/5/2017  1:21 PM CDT -----  Contact: pt  Pt is having problems with medication and would like to be seen before her appt in August. Please call pt at [phone]

## 2017-06-05 NOTE — TELEPHONE ENCOUNTER
Have her get mucinex dm over the counter and take 1 tablet at the same time she takes her mtx and take another dose about 8-12 hours later    This helps with the exact side effects she is having from the mtx   Make sure its mucinex DM that should take care of it       Not uncommon to developed things even after you have been taking mtx for a while

## 2017-06-06 NOTE — TELEPHONE ENCOUNTER
Spoke with Ms. Persaud and notified her of Ariana Quigley PA-C advisement below. Ms. Persaud states verbal understanding.

## 2017-06-12 ENCOUNTER — LAB VISIT (OUTPATIENT)
Dept: LAB | Facility: HOSPITAL | Age: 67
End: 2017-06-12
Attending: PEDIATRICS
Payer: MEDICARE

## 2017-06-12 DIAGNOSIS — R59.1 LYMPHADENOPATHY: ICD-10-CM

## 2017-06-12 DIAGNOSIS — D68.51 APC RESISTANCE: Primary | ICD-10-CM

## 2017-06-12 DIAGNOSIS — D68.51 APC RESISTANCE: ICD-10-CM

## 2017-06-12 LAB
CREAT SERPL-MCNC: 0.9 MG/DL
EST. GFR  (AFRICAN AMERICAN): >60 ML/MIN/1.73 M^2
EST. GFR  (NON AFRICAN AMERICAN): >60 ML/MIN/1.73 M^2

## 2017-06-12 PROCEDURE — 36415 COLL VENOUS BLD VENIPUNCTURE: CPT | Mod: PO

## 2017-06-12 PROCEDURE — 82565 ASSAY OF CREATININE: CPT | Mod: PO

## 2017-06-15 ENCOUNTER — OFFICE VISIT (OUTPATIENT)
Dept: RHEUMATOLOGY | Facility: CLINIC | Age: 67
End: 2017-06-15
Payer: MEDICARE

## 2017-06-15 VITALS
WEIGHT: 213.88 LBS | HEIGHT: 64 IN | DIASTOLIC BLOOD PRESSURE: 63 MMHG | HEART RATE: 61 BPM | SYSTOLIC BLOOD PRESSURE: 131 MMHG | BODY MASS INDEX: 36.51 KG/M2

## 2017-06-15 DIAGNOSIS — M81.0 OSTEOPOROSIS, POSTMENOPAUSAL: Chronic | ICD-10-CM

## 2017-06-15 DIAGNOSIS — Z79.52 IMMUNOCOMPROMISED DUE TO CORTICOSTEROIDS: Chronic | ICD-10-CM

## 2017-06-15 DIAGNOSIS — D86.9 SARCOID: Primary | Chronic | ICD-10-CM

## 2017-06-15 DIAGNOSIS — D84.821 IMMUNOCOMPROMISED DUE TO CORTICOSTEROIDS: Chronic | ICD-10-CM

## 2017-06-15 DIAGNOSIS — Z79.52 LONG TERM CURRENT USE OF SYSTEMIC STEROIDS: Chronic | ICD-10-CM

## 2017-06-15 DIAGNOSIS — M15.9 PRIMARY OSTEOARTHRITIS INVOLVING MULTIPLE JOINTS: ICD-10-CM

## 2017-06-15 DIAGNOSIS — T38.0X5A IMMUNOCOMPROMISED DUE TO CORTICOSTEROIDS: Chronic | ICD-10-CM

## 2017-06-15 PROCEDURE — 1125F AMNT PAIN NOTED PAIN PRSNT: CPT | Mod: S$GLB,,, | Performed by: PHYSICIAN ASSISTANT

## 2017-06-15 PROCEDURE — 1159F MED LIST DOCD IN RCRD: CPT | Mod: S$GLB,,, | Performed by: PHYSICIAN ASSISTANT

## 2017-06-15 PROCEDURE — 99999 PR PBB SHADOW E&M-EST. PATIENT-LVL IV: CPT | Mod: PBBFAC,,, | Performed by: PHYSICIAN ASSISTANT

## 2017-06-15 PROCEDURE — 99214 OFFICE O/P EST MOD 30 MIN: CPT | Mod: S$GLB,,, | Performed by: PHYSICIAN ASSISTANT

## 2017-06-15 PROCEDURE — 1157F ADVNC CARE PLAN IN RCRD: CPT | Mod: S$GLB,,, | Performed by: PHYSICIAN ASSISTANT

## 2017-06-15 RX ORDER — PREDNISONE 1 MG/1
4 TABLET ORAL DAILY
Qty: 120 TABLET | Refills: 3 | Status: SHIPPED | OUTPATIENT
Start: 2017-06-15 | End: 2017-12-20 | Stop reason: SDUPTHER

## 2017-06-15 RX ORDER — HYDROXYCHLOROQUINE SULFATE 200 MG/1
200 TABLET, FILM COATED ORAL 2 TIMES DAILY
Qty: 60 TABLET | Refills: 6 | Status: SHIPPED | OUTPATIENT
Start: 2017-06-15 | End: 2017-08-14 | Stop reason: SDUPTHER

## 2017-06-15 NOTE — PROGRESS NOTES
Subjective:       Patient ID: Cori Avalos is a 67 y.o. female.    Chief Complaint: Sarcoidosis and immunocompromised    Cori is back today for rheumatology urgent visit.     She has sarcoidosis with mild lung and cutaneous involvement (DX by skin biopsy initially). In 2013 she had a sarcoidosis flare up with elevated inflammatory parameters, elevated ACE level increase joint pain and swelling as well as increased nodules in her skin. mtx was started and she did better. Her dose was stable at 15 mg weekly. She was also on Plaquenil BID.  She went for evaluation last year at [facility] to see if there was anything else new or different for sarcoidosis treatment.  They discontinued her Plaquenil and increase her methotrexate to 20 mg once weekly and added minocycline 100 mg twice a day.  Minocycline causes dizziness she was intolerant of this.  She resumed Plaquenil but the dose of 200 mg twice daily.  Reports still having nodules under her skin that are popping up here and there.  Last year tried doxycycline 100 mg bid. Set her up with derm and had skin biopsy. Neg for sarcoid. Nodules consistent with lipoma, just a fatty tumor.  She reports was having a lot of dizziness, just foggy brain cognitive issues fatigue.  Was concerned that either doxycycline or methotrexate was causing or possibly both.  4 weeks ago she stopped her doxycycline but these issues continued to last week she stopped methotrexate.  She does not want to resume these medications.  She reports that she's feeling fine.  She's not had any fevers, no pleuritic chest pain, no other issues.  Minimal joint pain pain level 2/10.  She's currently on Plaquenil 200 mg twice daily and prednisone 5 mg daily.  She like to wean down and off of prednisone as well.    She does have some mild interstitial lung disease with some left lower linear scarring.  Will see pulmonology next month have a repeat CT.  Her breathing is stable.      Had left knee replacement  "about 9 months ago.  Knee healing well.  She does get some intermittent swelling.  Had full cardiology clearance prior to surgery.      Osteoporosis fractures in the past, failed bisphosphonate's, treated forteo X 24 months. Now on PROLIA every 6 months since 2013.  Repeat dexa 5/21/15 falling bmd at spine. Last year sustained right distal radial fx and L4 compression fx requiring kyphoplasty.  Last prolia 1/30/17 due again after 7/30/17. No issues with injections. Will need repeat dexa next visit         Sarcoidosis   Associated symptoms include fatigue and a rash. Pertinent negatives include no abdominal pain, arthralgias, chest pain, chills, fever, joint swelling, myalgias, nausea, neck pain, vomiting or weakness.     Review of Systems   Constitutional: Positive for fatigue. Negative for activity change, appetite change, chills and fever.   HENT: Negative.  Negative for mouth sores and trouble swallowing.         No dry mouth   Eyes: Negative.  Negative for photophobia, pain and redness.        No swollen or red eyes, no dry eye     Respiratory: Positive for shortness of breath. Negative for chest tightness, wheezing and stridor.         Stable  Able to exercise regularly    Cardiovascular: Negative.  Negative for chest pain.   Gastrointestinal: Negative.  Negative for abdominal pain, blood in stool, diarrhea, nausea and vomiting.   Genitourinary: Negative.  Negative for dysuria, frequency, hematuria and urgency.   Musculoskeletal: Negative for arthralgias, back pain, gait problem, joint swelling, myalgias, neck pain and neck stiffness.   Skin: Positive for rash. Negative for color change and pallor.   Neurological: Positive for dizziness. Negative for weakness.   Hematological: Negative for adenopathy.   Psychiatric/Behavioral: Negative for suicidal ideas.         Objective:     /63   Pulse 61   Ht 5' 4" (1.626 m)   Wt 97 kg (213 lb 13.5 oz)   BMI 36.71 kg/m²      Physical Exam   Constitutional: She is " oriented to person, place, and time and well-developed, well-nourished, and in no distress. No distress.   HENT:   Head: Normocephalic and atraumatic.   Right Ear: External ear normal.   Left Ear: External ear normal.   Mouth/Throat: No oropharyngeal exudate.   Eyes: Conjunctivae and EOM are normal. Pupils are equal, round, and reactive to light. No scleral icterus.   Neck: Normal range of motion. Neck supple. No thyromegaly present.   Cardiovascular: Normal rate, regular rhythm and normal heart sounds.    No murmur heard.  Pulmonary/Chest: Effort normal and breath sounds normal. She exhibits no tenderness.   Abdominal: Soft. Bowel sounds are normal.       Right Side Rheumatological Exam     Examination finds the shoulder, elbow, wrist, knee, 1st PIP, 1st MCP, 2nd PIP, 2nd MCP, 3rd PIP, 3rd MCP, 4th PIP, 4th MCP, 5th PIP and 5th MCP normal.    Muscle Strength (0-5 scale):  Deltoid:  5  Biceps: 5/5   Triceps:  5  : 5/5   Iliopsoas: 4.6  Quadriceps:  4.8   Distal Lower Extremity: 4.8    Left Side Rheumatological Exam     Examination finds the shoulder, elbow, wrist, 1st PIP, 1st MCP, 2nd PIP, 2nd MCP, 3rd PIP, 3rd MCP, 4th PIP, 4th MCP, 5th PIP and 5th MCP normal.    The patient is tender to palpation of the knee.    Muscle Strength (0-5 scale):  Deltoid:  5  Biceps: 5/5   Triceps:  5  :  5/5   Iliopsoas: 4.2  Quadriceps:  4.8   Distal Lower Extremity: 4.8      Lymphadenopathy:     She has no cervical adenopathy.   Neurological: She is alert and oriented to person, place, and time. She displays normal reflexes. No cranial nerve deficit. She exhibits normal muscle tone. Gait normal.   Skin: Skin is warm and dry. Rash noted. Rash is nodular.     Multiple subcutaneous nodules on her skin bilateral arms, abdomen and legs   Musculoskeletal: Normal range of motion. She exhibits no edema.                  Recent Results (from the past 336 hour(s))   Creatinine, serum    Collection Time: 06/12/17 10:20 AM   Result  Value Ref Range    Creatinine 0.9 0.5 - 1.4 mg/dL    eGFR if African American >60 >60 mL/min/1.73 m^2    eGFR if non African American >60 >60 mL/min/1.73 m^2     DEXA TF -1.8, FN -2.5, spine -2.3- osteoporosis with falling bmd     Assessment:       1. Sarcoid    2. Long term current use of systemic steroids    3. 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 Osteoporosis, postmenopausal    5. Primary osteoarthritis involving multiple joints        1.  Sarcoidosis with pulmonary and skin involvement (biopsy proven in the past)  -- now off methotrexate and doxy due to side effects   Currently stable on Plaquenil 200 mg twice daily and low dose prednisone    Skin nodules with recent  biopsy consistent with lipoma not sarcoid nodules     2.  Status post total left knee arthroscopy doing well    3.  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  Long term steroid use and osteoporosis currently on treatment with prolia DEXA last done 5/2015 due again next visit     7.  Medication monitoring--no toxicity issues with her medication    8.  Chronic immunosuppression-no issues with recurrent infections    9. Osteoporosis in setting of long term steroid use- on prolia last done 1/30/17 due again early august with repeat dexa due     10.  Mild scarring of the lung monitored by pulmonology to have follow-up visit next month with repeat CT    Plan:         Reassurance  For now will remain off mtx, no go back on due to her lung issues and side effects, consider imuran or arava if need to add dmard  Stay off doxy due to recurrent yeast infxn    Keep her on plaquenil 200 mg bid  Cut her back to prednisone to 4 mg daily and if still doing well next visit cut back to 3 mg     Follow up with pulmonary next month, send us copy of clinic note    Return to clinic as scheduled in august with labs, prolia and dexa    Call with any questions, changes, or  concerns

## 2017-07-05 ENCOUNTER — HOSPITAL ENCOUNTER (OUTPATIENT)
Dept: RADIOLOGY | Facility: HOSPITAL | Age: 67
Discharge: HOME | End: 2017-07-05
Attending: INTERNAL MEDICINE
Payer: MEDICARE

## 2017-07-05 DIAGNOSIS — R59.1 LYMPHADENOPATHY: ICD-10-CM

## 2017-07-05 PROCEDURE — 71260 CT THORAX DX C+: CPT | Mod: TC,PO

## 2017-07-05 PROCEDURE — 71260 CT THORAX DX C+: CPT | Mod: 26,,, | Performed by: RADIOLOGY

## 2017-07-05 PROCEDURE — 25500020 PHARM REV CODE 255: Mod: PO | Performed by: INTERNAL MEDICINE

## 2017-07-05 RX ADMIN — IOHEXOL 75 ML: 350 INJECTION, SOLUTION INTRAVENOUS at 08:07

## 2017-07-07 ENCOUNTER — TELEPHONE (OUTPATIENT)
Dept: RHEUMATOLOGY | Facility: CLINIC | Age: 67
End: 2017-07-07

## 2017-07-07 NOTE — TELEPHONE ENCOUNTER
Ok  Please have her pulmonary doc send us a copy of his note and copy of the CT report so we can put on file     Or she can bring us  copies at her next apt

## 2017-07-07 NOTE — TELEPHONE ENCOUNTER
----- Message from Yvette Srivastava sent at 7/7/2017  9:08 AM CDT -----  Contact: pt  Pt states she had a contrast test of lungs, need to discuss with nurse. Please call pt @ [phone].

## 2017-08-07 ENCOUNTER — LAB VISIT (OUTPATIENT)
Dept: LAB | Facility: HOSPITAL | Age: 67
End: 2017-08-07
Attending: INTERNAL MEDICINE
Payer: MEDICARE

## 2017-08-07 DIAGNOSIS — D86.9 SARCOID: Chronic | ICD-10-CM

## 2017-08-07 DIAGNOSIS — Z79.52 LONG TERM CURRENT USE OF SYSTEMIC STEROIDS: Chronic | ICD-10-CM

## 2017-08-07 LAB
ALBUMIN SERPL BCP-MCNC: 3.6 G/DL
ALP SERPL-CCNC: 94 U/L
ALT SERPL W/O P-5'-P-CCNC: 16 U/L
ANION GAP SERPL CALC-SCNC: 13 MMOL/L
AST SERPL-CCNC: 17 U/L
BASOPHILS # BLD AUTO: 0.03 K/UL
BASOPHILS NFR BLD: 0.4 %
BILIRUB SERPL-MCNC: 0.7 MG/DL
BUN SERPL-MCNC: 21 MG/DL
CALCIUM SERPL-MCNC: 9.2 MG/DL
CHLORIDE SERPL-SCNC: 101 MMOL/L
CO2 SERPL-SCNC: 31 MMOL/L
CREAT SERPL-MCNC: 0.9 MG/DL
CRP SERPL-MCNC: 10.1 MG/L
DIFFERENTIAL METHOD: ABNORMAL
EOSINOPHIL # BLD AUTO: 0.2 K/UL
EOSINOPHIL NFR BLD: 2.4 %
ERYTHROCYTE [DISTWIDTH] IN BLOOD BY AUTOMATED COUNT: 13.5 %
ERYTHROCYTE [SEDIMENTATION RATE] IN BLOOD BY WESTERGREN METHOD: 15 MM/HR
EST. GFR  (AFRICAN AMERICAN): >60 ML/MIN/1.73 M^2
EST. GFR  (NON AFRICAN AMERICAN): >60 ML/MIN/1.73 M^2
GLUCOSE SERPL-MCNC: 81 MG/DL
HCT VFR BLD AUTO: 36.7 %
HGB BLD-MCNC: 12.2 G/DL
LYMPHOCYTES # BLD AUTO: 1.7 K/UL
LYMPHOCYTES NFR BLD: 24 %
MCH RBC QN AUTO: 31.8 PG
MCHC RBC AUTO-ENTMCNC: 33.2 G/DL
MCV RBC AUTO: 96 FL
MONOCYTES # BLD AUTO: 0.8 K/UL
MONOCYTES NFR BLD: 11.3 %
NEUTROPHILS # BLD AUTO: 4.4 K/UL
NEUTROPHILS NFR BLD: 61.9 %
PLATELET # BLD AUTO: 213 K/UL
PMV BLD AUTO: 9.1 FL
POTASSIUM SERPL-SCNC: 3.6 MMOL/L
PROT SERPL-MCNC: 6.8 G/DL
RBC # BLD AUTO: 3.84 M/UL
SODIUM SERPL-SCNC: 145 MMOL/L
WBC # BLD AUTO: 7.09 K/UL

## 2017-08-07 PROCEDURE — 85651 RBC SED RATE NONAUTOMATED: CPT | Mod: PO

## 2017-08-07 PROCEDURE — 86140 C-REACTIVE PROTEIN: CPT

## 2017-08-07 PROCEDURE — 36415 COLL VENOUS BLD VENIPUNCTURE: CPT | Mod: PO

## 2017-08-07 PROCEDURE — 82164 ANGIOTENSIN I ENZYME TEST: CPT

## 2017-08-07 PROCEDURE — 80053 COMPREHEN METABOLIC PANEL: CPT | Mod: PO

## 2017-08-07 PROCEDURE — 85025 COMPLETE CBC W/AUTO DIFF WBC: CPT | Mod: PO

## 2017-08-09 LAB — ACE SERPL-CCNC: 64 U/L

## 2017-08-14 ENCOUNTER — APPOINTMENT (OUTPATIENT)
Dept: RADIOLOGY | Facility: CLINIC | Age: 67
End: 2017-08-14
Attending: INTERNAL MEDICINE
Payer: COMMERCIAL

## 2017-08-14 ENCOUNTER — OFFICE VISIT (OUTPATIENT)
Dept: RHEUMATOLOGY | Facility: CLINIC | Age: 67
End: 2017-08-14
Payer: COMMERCIAL

## 2017-08-14 VITALS
HEIGHT: 62 IN | WEIGHT: 218.25 LBS | BODY MASS INDEX: 40.16 KG/M2 | HEART RATE: 73 BPM | SYSTOLIC BLOOD PRESSURE: 134 MMHG | DIASTOLIC BLOOD PRESSURE: 71 MMHG

## 2017-08-14 DIAGNOSIS — D84.821 IMMUNOCOMPROMISED DUE TO CORTICOSTEROIDS: Chronic | ICD-10-CM

## 2017-08-14 DIAGNOSIS — D86.9 SARCOID: Primary | Chronic | ICD-10-CM

## 2017-08-14 DIAGNOSIS — M81.0 OSTEOPOROSIS, POSTMENOPAUSAL: Chronic | ICD-10-CM

## 2017-08-14 DIAGNOSIS — T38.0X5A IMMUNOCOMPROMISED DUE TO CORTICOSTEROIDS: Chronic | ICD-10-CM

## 2017-08-14 DIAGNOSIS — Z79.52 LONG TERM CURRENT USE OF SYSTEMIC STEROIDS: Chronic | ICD-10-CM

## 2017-08-14 DIAGNOSIS — M15.9 PRIMARY OSTEOARTHRITIS INVOLVING MULTIPLE JOINTS: ICD-10-CM

## 2017-08-14 DIAGNOSIS — M47.816 SPONDYLOSIS OF LUMBAR REGION WITHOUT MYELOPATHY OR RADICULOPATHY: Chronic | ICD-10-CM

## 2017-08-14 DIAGNOSIS — Z79.52 IMMUNOCOMPROMISED DUE TO CORTICOSTEROIDS: Chronic | ICD-10-CM

## 2017-08-14 DIAGNOSIS — Z79.01 LONG TERM CURRENT USE OF ANTICOAGULANT: Chronic | ICD-10-CM

## 2017-08-14 PROCEDURE — 1159F MED LIST DOCD IN RCRD: CPT | Mod: S$GLB,,, | Performed by: INTERNAL MEDICINE

## 2017-08-14 PROCEDURE — 3075F SYST BP GE 130 - 139MM HG: CPT | Mod: S$GLB,,, | Performed by: INTERNAL MEDICINE

## 2017-08-14 PROCEDURE — 96372 THER/PROPH/DIAG INJ SC/IM: CPT | Mod: S$GLB,,, | Performed by: INTERNAL MEDICINE

## 2017-08-14 PROCEDURE — 99999 PR PBB SHADOW E&M-EST. PATIENT-LVL IV: CPT | Mod: PBBFAC,,, | Performed by: INTERNAL MEDICINE

## 2017-08-14 PROCEDURE — 3078F DIAST BP <80 MM HG: CPT | Mod: S$GLB,,, | Performed by: INTERNAL MEDICINE

## 2017-08-14 PROCEDURE — 99214 OFFICE O/P EST MOD 30 MIN: CPT | Mod: 25,S$GLB,, | Performed by: INTERNAL MEDICINE

## 2017-08-14 PROCEDURE — 77080 DXA BONE DENSITY AXIAL: CPT | Mod: TC

## 2017-08-14 PROCEDURE — 1157F ADVNC CARE PLAN IN RCRD: CPT | Mod: S$GLB,,, | Performed by: INTERNAL MEDICINE

## 2017-08-14 PROCEDURE — 77080 DXA BONE DENSITY AXIAL: CPT | Mod: 26,,, | Performed by: INTERNAL MEDICINE

## 2017-08-14 PROCEDURE — 3008F BODY MASS INDEX DOCD: CPT | Mod: S$GLB,,, | Performed by: INTERNAL MEDICINE

## 2017-08-14 PROCEDURE — 1125F AMNT PAIN NOTED PAIN PRSNT: CPT | Mod: S$GLB,,, | Performed by: INTERNAL MEDICINE

## 2017-08-14 RX ORDER — LOPERAMIDE HYDROCHLORIDE 2 MG/1
2 CAPSULE ORAL ONCE AS NEEDED
COMMUNITY
End: 2019-03-05

## 2017-08-14 RX ORDER — HYDROXYCHLOROQUINE SULFATE 200 MG/1
200 TABLET, FILM COATED ORAL 2 TIMES DAILY
Qty: 60 TABLET | Refills: 6 | Status: SHIPPED | OUTPATIENT
Start: 2017-08-14 | End: 2018-02-19 | Stop reason: SDUPTHER

## 2017-08-14 NOTE — PROGRESS NOTES
"Subjective:       Patient ID: Cori Avalos is a 67 y.o. female.    Chief Complaint: No chief complaint on file.    Here for follow-up rheumatology visit.     She has sarcoidosis with mild lung and cutaneous involvement (DX by skin biopsy initially). In 2013 she had a sarcoidosis flare up with elevated inflammatory parameters, elevated ACE level increase joint pain and swelling as well as increased nodules in her skin. mtx was started and she did better. Her dose was stable at 15 mg weekly. She was also on Plaquenil BID.  She went for evaluation last year at [facility] to see if there was anything else new or different for sarcoidosis treatment.  They discontinued her Plaquenil and increase her methotrexate to 20 mg once weekly and added minocycline 100 mg twice a day.  Minocycline causes dizziness she was intolerant of this.  She resumed Plaquenil but the dose of 200 mg twice daily. Last year tried doxycycline 100 mg bid. Set her up with derm and had skin biopsy. Neg for sarcoid. Nodules consistent with lipoma, just a fatty tumor.  She reports was having a lot of dizziness, just foggy brain cognitive issues fatigue.  Was concerned that either doxycycline or methotrexate was causing or possibly both.  Has stopped doxycycline and methotrexate.  She does not want to resume these medications.      Currently reports that she is doing fine but still has lumps "popping up everywhere". Denies chest pain, shortness of breath, fevers, chill, joint pain/swelling. Currently taking prednisone 3 mg for the past two weeks and plaquenil 200mg BID. Has some issues with balance but has been doing water therapy which is helping.     She does have some mild interstitial lung disease with some left lower linear scarring.  Had a CT scan recently- was told lungs better than before and no sarcoid involvement. Her breathing is stable.      Osteoporosis fractures in the past, failed bisphosphonate's, treated forteo X 24 months. Now on " "PROLIA every 6 months since 2013.  Repeat dexa 5/21/15 falling bmd at spine. Last year sustained right distal radial fx and L4 compression fx requiring kyphoplasty.  Last prolia 1/30/17. Due for injection today. Had DEXA today.        Sarcoidosis   Associated symptoms include weakness. Pertinent negatives include no abdominal pain, arthralgias, chest pain, chills, fatigue, fever, joint swelling, myalgias, nausea, neck pain, rash or vomiting.     Review of Systems   Constitutional: Negative for appetite change, chills, fatigue and fever.   HENT: Negative.  Negative for mouth sores and trouble swallowing.         Dry mouth, dry eyes.    Eyes: Negative.  Negative for photophobia, pain and redness.        No swollen or red eyes, no dry eye     Respiratory: Negative for chest tightness, shortness of breath, wheezing and stridor.         Stable  Able to exercise regularly    Cardiovascular: Negative.  Negative for chest pain.   Gastrointestinal: Negative.  Negative for abdominal pain, blood in stool, diarrhea, nausea and vomiting.   Genitourinary: Negative.  Negative for dysuria, frequency, hematuria and urgency.   Musculoskeletal: Positive for back pain. Negative for arthralgias, gait problem, joint swelling, myalgias, neck pain and neck stiffness.        Dislocated vertebrae- pain in back.    Skin: Negative for color change, pallor and rash.        Lumps      Neurological: Positive for weakness. Negative for dizziness.        Having problems with balance   Some weakness in legs.    Hematological: Negative for adenopathy.   Psychiatric/Behavioral: Negative for suicidal ideas.         Objective:     /71   Pulse 73   Ht 5' 2" (1.575 m)   Wt 99 kg (218 lb 4.1 oz)   BMI 39.92 kg/m²      Physical Exam   Constitutional: She is oriented to person, place, and time and well-developed, well-nourished, and in no distress. No distress.   HENT:   Head: Normocephalic and atraumatic.   Right Ear: External ear normal.   Left " Ear: External ear normal.   Mouth/Throat: No oropharyngeal exudate.   Eyes: Conjunctivae and EOM are normal. Pupils are equal, round, and reactive to light. No scleral icterus.   Neck: Normal range of motion. Neck supple. No thyromegaly present.   Cardiovascular: Normal rate, regular rhythm and normal heart sounds.    No murmur heard.  Pulmonary/Chest: Effort normal and breath sounds normal. She exhibits no tenderness.   Abdominal: Soft. Bowel sounds are normal.       Right Side Rheumatological Exam     Examination finds the shoulder, elbow, wrist, knee, 1st PIP, 1st MCP, 2nd PIP, 2nd MCP, 3rd PIP, 3rd MCP, 4th PIP, 4th MCP, 5th PIP and 5th MCP normal.    Muscle Strength (0-5 scale):  Deltoid:  5  Biceps: 5/5   Triceps:  5  : 5/5   Iliopsoas: 4.6  Quadriceps:  4.8   Distal Lower Extremity: 4.8    Left Side Rheumatological Exam     Examination finds the shoulder, elbow, wrist, knee, 1st PIP, 1st MCP, 2nd PIP, 2nd MCP, 3rd PIP, 3rd MCP, 4th PIP, 4th MCP, 5th PIP and 5th MCP normal.    Muscle Strength (0-5 scale):  Deltoid:  5  Biceps: 5/5   Triceps:  5  :  5/5   Iliopsoas: 4.2  Quadriceps:  4.8   Distal Lower Extremity: 4.8      Lymphadenopathy:     She has no cervical adenopathy.   Neurological: She is alert and oriented to person, place, and time. She displays normal reflexes. No cranial nerve deficit. She exhibits normal muscle tone. Gait normal.   Skin: Skin is warm and dry. Rash noted. Rash is nodular.     Multiple subcutaneous nodules on her skin bilateral arms, abdomen and legs   Musculoskeletal: Normal range of motion. She exhibits no edema.                  Recent Results (from the past 336 hour(s))   Urinalysis    Collection Time: 08/07/17  9:25 AM   Result Value Ref Range    Specimen UA Urine, Clean Catch     Color, UA Yellow Yellow, Straw, Mignon    Appearance, UA Clear Clear    pH, UA 6.0 5.0 - 8.0    Specific Gravity, UA 1.010 1.005 - 1.030    Protein, UA Negative Negative    Glucose, UA Negative  Negative    Ketones, UA Negative Negative    Bilirubin (UA) Negative Negative    Occult Blood UA Negative Negative    Nitrite, UA Negative Negative    Leukocytes, UA Negative Negative   CBC auto differential    Collection Time: 08/07/17  9:39 AM   Result Value Ref Range    WBC 7.09 3.90 - 12.70 K/uL    RBC 3.84 (L) 4.00 - 5.40 M/uL    Hemoglobin 12.2 12.0 - 16.0 g/dL    Hematocrit 36.7 (L) 37.0 - 48.5 %    MCV 96 82 - 98 fL    MCH 31.8 (H) 27.0 - 31.0 pg    MCHC 33.2 32.0 - 36.0 g/dL    RDW 13.5 11.5 - 14.5 %    Platelets 213 150 - 350 K/uL    MPV 9.1 (L) 9.2 - 12.9 fL    Gran # 4.4 1.8 - 7.7 K/uL    Lymph # 1.7 1.0 - 4.8 K/uL    Mono # 0.8 0.3 - 1.0 K/uL    Eos # 0.2 0.0 - 0.5 K/uL    Baso # 0.03 0.00 - 0.20 K/uL    Gran% 61.9 38.0 - 73.0 %    Lymph% 24.0 18.0 - 48.0 %    Mono% 11.3 4.0 - 15.0 %    Eosinophil% 2.4 0.0 - 8.0 %    Basophil% 0.4 0.0 - 1.9 %    Differential Method Automated    Comprehensive metabolic panel    Collection Time: 08/07/17  9:39 AM   Result Value Ref Range    Sodium 145 136 - 145 mmol/L    Potassium 3.6 3.5 - 5.1 mmol/L    Chloride 101 95 - 110 mmol/L    CO2 31 (H) 23 - 29 mmol/L    Glucose 81 70 - 110 mg/dL    BUN, Bld 21 8 - 23 mg/dL    Creatinine 0.9 0.5 - 1.4 mg/dL    Calcium 9.2 8.7 - 10.5 mg/dL    Total Protein 6.8 6.0 - 8.4 g/dL    Albumin 3.6 3.5 - 5.2 g/dL    Total Bilirubin 0.7 0.1 - 1.0 mg/dL    Alkaline Phosphatase 94 55 - 135 U/L    AST 17 10 - 40 U/L    ALT 16 10 - 44 U/L    Anion Gap 13 8 - 16 mmol/L    eGFR if African American >60 >60 mL/min/1.73 m^2    eGFR if non African American >60 >60 mL/min/1.73 m^2   C-reactive protein    Collection Time: 08/07/17  9:39 AM   Result Value Ref Range    CRP 10.1 (H) 0.0 - 8.2 mg/L   Sedimentation rate, manual    Collection Time: 08/07/17  9:39 AM   Result Value Ref Range    Sed Rate 15 0 - 20 mm/Hr   Angiotensin converting enzyme    Collection Time: 08/07/17  9:39 AM   Result Value Ref Range    Angio Convert Enzyme 64 (H) 8 - 53 U/L      DEXA TF -1.8, FN -2.5, spine -2.3- osteoporosis with falling bmd   DEXA TF -1.8, FN -2.5, spine -1.9- osteoporosis with falling bmd    Assessment:       1. Sarcoid    2. Spondylosis of lumbar region without myelopathy or radiculopathy    3. Long term current use of anticoagulant    4. Immunocompromised due to corticosteroids    5. Long term current use of systemic steroids    6. Primary osteoarthritis involving multiple joints        1.  Sarcoidosis with pulmonary and skin involvement (biopsy proven in the past)  -- now off methotrexate and doxy due to side effects   Currently stable on Plaquenil 200 mg twice daily and low dose prednisone. Inflammatory markers decreasing. ACE level stable.     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     2.  Status post total left knee arthroscopy doing well    3.  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  Long term steroid use and osteoporosis currently on treatment with prolia DEXA last done 8/14/2017    7.  Medication monitoring--no toxicity issues with her medication    8.  Chronic immunosuppression-no issues with recurrent infections    9. Osteoporosis in setting of long term steroid use- on prolia last done 1/30/17, due for injection today. Had BMD done today showing osteoporosis; some improvement in spine, overall total BMD slightly decreased. Next DEXA in 2 years.     10.  Mild scarring of the lung monitored by pulmonology. Had recent CT which showed no sarcoid involvement.     Plan:       Reassurance  For now will remain off mtx, will not go back on due to her lung issues and side effects, consider imuran or arava if need to add dmard  Stay off doxy due to recurrent yeast infxn  Continue with physical therapy for balance   Keep her on plaquenil 200 mg bid; refilled medication today.   Cut  her back to prednisone to 2 mg daily and if still doing well next visit cut back to 1 mg. When feeling more inflammation with nodules, can increase dose short term.     Return to clinic in 6 months with labs.       Call with any questions, changes, or concerns      Discussed with attending Dr. Balderrama who agrees with assessment and plan.     I have personally reviewed the history with the patient in the room, examined the patient's joints and pertinent organ systems in the room and developed the above Impressions and Plan. The Imp: and Plan were discussed with the patient and family(if available) before leaving the room. SONNY Balderrama MD

## 2017-08-14 NOTE — PROGRESS NOTES
Administered 1cc Prolia 60mg/cc to LUQ of abdomen. Labs reviewed: Calcium 9.2, Creatinine 0.9 . Pt tolerated well. No acute reaction noted to site. Pt instructed on S/S to report. Pt verbalized understanding. Patient waited 15 mins.     Lot:[id number]  Exp:02/2019  Manu:Ruth Ann

## 2017-08-15 ENCOUNTER — LAB VISIT (OUTPATIENT)
Dept: LAB | Facility: HOSPITAL | Age: 67
End: 2017-08-15
Attending: PEDIATRICS
Payer: COMMERCIAL

## 2017-08-15 DIAGNOSIS — I10 ESSENTIAL HYPERTENSION, MALIGNANT: ICD-10-CM

## 2017-08-15 DIAGNOSIS — D68.51 APC RESISTANCE: ICD-10-CM

## 2017-08-15 DIAGNOSIS — I82.90 THROMBUS: Primary | ICD-10-CM

## 2017-08-15 LAB
ALBUMIN SERPL BCP-MCNC: 3.4 G/DL
ALP SERPL-CCNC: 88 U/L
ALT SERPL W/O P-5'-P-CCNC: 19 U/L
ANION GAP SERPL CALC-SCNC: 6 MMOL/L
AST SERPL-CCNC: 24 U/L
BASOPHILS # BLD AUTO: 0.02 K/UL
BASOPHILS NFR BLD: 0.3 %
BILIRUB SERPL-MCNC: 0.6 MG/DL
BUN SERPL-MCNC: 20 MG/DL
CALCIUM SERPL-MCNC: 9.4 MG/DL
CHLORIDE SERPL-SCNC: 105 MMOL/L
CHOLEST/HDLC SERPL: 2.5 {RATIO}
CO2 SERPL-SCNC: 31 MMOL/L
CREAT SERPL-MCNC: 0.9 MG/DL
DIFFERENTIAL METHOD: ABNORMAL
EOSINOPHIL # BLD AUTO: 0.1 K/UL
EOSINOPHIL NFR BLD: 2.3 %
ERYTHROCYTE [DISTWIDTH] IN BLOOD BY AUTOMATED COUNT: 13.8 %
EST. GFR  (AFRICAN AMERICAN): >60 ML/MIN/1.73 M^2
EST. GFR  (NON AFRICAN AMERICAN): >60 ML/MIN/1.73 M^2
ESTIMATED AVG GLUCOSE: 103 MG/DL
GLUCOSE SERPL-MCNC: 87 MG/DL
HBA1C MFR BLD HPLC: 5.2 %
HCT VFR BLD AUTO: 34.1 %
HDL/CHOLESTEROL RATIO: 39.7 %
HDLC SERPL-MCNC: 141 MG/DL
HDLC SERPL-MCNC: 56 MG/DL
HGB BLD-MCNC: 11.4 G/DL
LDLC SERPL CALC-MCNC: 63.4 MG/DL
LYMPHOCYTES # BLD AUTO: 1.1 K/UL
LYMPHOCYTES NFR BLD: 19.7 %
MCH RBC QN AUTO: 31.8 PG
MCHC RBC AUTO-ENTMCNC: 33.4 G/DL
MCV RBC AUTO: 95 FL
MONOCYTES # BLD AUTO: 0.5 K/UL
MONOCYTES NFR BLD: 9.4 %
NEUTROPHILS # BLD AUTO: 3.9 K/UL
NEUTROPHILS NFR BLD: 68.3 %
NONHDLC SERPL-MCNC: 85 MG/DL
PLATELET # BLD AUTO: 166 K/UL
PMV BLD AUTO: 9.9 FL
POTASSIUM SERPL-SCNC: 4.4 MMOL/L
PROT SERPL-MCNC: 6.6 G/DL
RBC # BLD AUTO: 3.58 M/UL
SODIUM SERPL-SCNC: 142 MMOL/L
TRIGL SERPL-MCNC: 108 MG/DL
TSH SERPL DL<=0.005 MIU/L-ACNC: 1.04 UIU/ML
WBC # BLD AUTO: 5.74 K/UL

## 2017-08-15 PROCEDURE — 84443 ASSAY THYROID STIM HORMONE: CPT

## 2017-08-15 PROCEDURE — 36415 COLL VENOUS BLD VENIPUNCTURE: CPT | Mod: PO

## 2017-08-15 PROCEDURE — 80053 COMPREHEN METABOLIC PANEL: CPT

## 2017-08-15 PROCEDURE — 83036 HEMOGLOBIN GLYCOSYLATED A1C: CPT

## 2017-08-15 PROCEDURE — 80061 LIPID PANEL: CPT

## 2017-08-15 PROCEDURE — 85025 COMPLETE CBC W/AUTO DIFF WBC: CPT

## 2017-08-17 NOTE — PROGRESS NOTES
Hx reviewed personally  with pt. in room, examined pt.'s joints and pertinent organ areas in room, developed  Imp/Plan as follows and discussed same with patient and/or family.  Sarcoid appears stable by exam and lab- try and taper prednisone further.

## 2017-09-25 ENCOUNTER — LAB VISIT (OUTPATIENT)
Dept: LAB | Facility: HOSPITAL | Age: 67
End: 2017-09-25
Attending: INTERNAL MEDICINE
Payer: COMMERCIAL

## 2017-09-25 DIAGNOSIS — Z00.00 ROUTINE GENERAL MEDICAL EXAMINATION AT A HEALTH CARE FACILITY: Primary | ICD-10-CM

## 2017-09-25 DIAGNOSIS — D64.9 ANEMIA, UNSPECIFIED: ICD-10-CM

## 2017-09-25 LAB
BASOPHILS # BLD AUTO: 0.01 K/UL
BASOPHILS NFR BLD: 0.2 %
DIFFERENTIAL METHOD: ABNORMAL
EOSINOPHIL # BLD AUTO: 0.2 K/UL
EOSINOPHIL NFR BLD: 2.6 %
ERYTHROCYTE [DISTWIDTH] IN BLOOD BY AUTOMATED COUNT: 14.3 %
HCT VFR BLD AUTO: 40.4 %
HGB BLD-MCNC: 13.5 G/DL
IRON SERPL-MCNC: 73 UG/DL
LYMPHOCYTES # BLD AUTO: 1.7 K/UL
LYMPHOCYTES NFR BLD: 26.6 %
MCH RBC QN AUTO: 31.8 PG
MCHC RBC AUTO-ENTMCNC: 33.4 G/DL
MCV RBC AUTO: 95 FL
MONOCYTES # BLD AUTO: 0.4 K/UL
MONOCYTES NFR BLD: 6.7 %
NEUTROPHILS # BLD AUTO: 4 K/UL
NEUTROPHILS NFR BLD: 63.7 %
PLATELET # BLD AUTO: 161 K/UL
PMV BLD AUTO: 10.3 FL
RBC # BLD AUTO: 4.24 M/UL
SATURATED IRON: 14 %
TOTAL IRON BINDING CAPACITY: 522 UG/DL
TRANSFERRIN SERPL-MCNC: 353 MG/DL
WBC # BLD AUTO: 6.24 K/UL

## 2017-09-25 PROCEDURE — 36415 COLL VENOUS BLD VENIPUNCTURE: CPT | Mod: PO

## 2017-09-25 PROCEDURE — 83540 ASSAY OF IRON: CPT

## 2017-09-25 PROCEDURE — 85025 COMPLETE CBC W/AUTO DIFF WBC: CPT

## 2017-12-20 RX ORDER — PREDNISONE 1 MG/1
4 TABLET ORAL DAILY
Qty: 120 TABLET | Refills: 3 | Status: SHIPPED | OUTPATIENT
Start: 2017-12-20 | End: 2018-08-20

## 2018-02-14 ENCOUNTER — TELEPHONE (OUTPATIENT)
Dept: RHEUMATOLOGY | Facility: CLINIC | Age: 68
End: 2018-02-14

## 2018-02-14 NOTE — TELEPHONE ENCOUNTER
----- Message from Dinora Foster sent at 2/14/2018  9:09 AM CST -----  Contact: self   Patient would like to consult with nurse regarding labs. Please call back at [phone].      Thanks,  Dinora Foster

## 2018-02-14 NOTE — TELEPHONE ENCOUNTER
Spoke with patient. She has the flu and had to cancel her lab appointment. Labs rescheduled for 2-19 before her follow up appointment with Ms Quigley.

## 2018-02-19 ENCOUNTER — LAB VISIT (OUTPATIENT)
Dept: LAB | Facility: HOSPITAL | Age: 68
End: 2018-02-19
Attending: INTERNAL MEDICINE
Payer: COMMERCIAL

## 2018-02-19 ENCOUNTER — OFFICE VISIT (OUTPATIENT)
Dept: RHEUMATOLOGY | Facility: CLINIC | Age: 68
End: 2018-02-19
Payer: COMMERCIAL

## 2018-02-19 VITALS
DIASTOLIC BLOOD PRESSURE: 75 MMHG | HEIGHT: 62 IN | SYSTOLIC BLOOD PRESSURE: 149 MMHG | BODY MASS INDEX: 41.3 KG/M2 | WEIGHT: 224.44 LBS | HEART RATE: 71 BPM

## 2018-02-19 DIAGNOSIS — M15.9 PRIMARY OSTEOARTHRITIS INVOLVING MULTIPLE JOINTS: ICD-10-CM

## 2018-02-19 DIAGNOSIS — D86.9 SARCOID: Primary | Chronic | ICD-10-CM

## 2018-02-19 DIAGNOSIS — M81.0 OSTEOPOROSIS, POSTMENOPAUSAL: Chronic | ICD-10-CM

## 2018-02-19 DIAGNOSIS — D86.9 SARCOID: Chronic | ICD-10-CM

## 2018-02-19 DIAGNOSIS — Z79.52 LONG TERM CURRENT USE OF SYSTEMIC STEROIDS: Chronic | ICD-10-CM

## 2018-02-19 DIAGNOSIS — Z79.899 HIGH RISK MEDICATION USE: ICD-10-CM

## 2018-02-19 LAB
ALBUMIN SERPL BCP-MCNC: 3.8 G/DL
ALP SERPL-CCNC: 90 U/L
ALT SERPL W/O P-5'-P-CCNC: 18 U/L
ANION GAP SERPL CALC-SCNC: 12 MMOL/L
AST SERPL-CCNC: 20 U/L
BASOPHILS # BLD AUTO: 0.02 K/UL
BASOPHILS NFR BLD: 0.4 %
BILIRUB SERPL-MCNC: 1.1 MG/DL
BUN SERPL-MCNC: 16 MG/DL
CALCIUM SERPL-MCNC: 10 MG/DL
CHLORIDE SERPL-SCNC: 104 MMOL/L
CO2 SERPL-SCNC: 28 MMOL/L
CREAT SERPL-MCNC: 0.8 MG/DL
CRP SERPL-MCNC: 17.2 MG/L
DIFFERENTIAL METHOD: ABNORMAL
EOSINOPHIL # BLD AUTO: 0.2 K/UL
EOSINOPHIL NFR BLD: 4.1 %
ERYTHROCYTE [DISTWIDTH] IN BLOOD BY AUTOMATED COUNT: 12.8 %
ERYTHROCYTE [SEDIMENTATION RATE] IN BLOOD BY WESTERGREN METHOD: 23 MM/HR
EST. GFR  (AFRICAN AMERICAN): >60 ML/MIN/1.73 M^2
EST. GFR  (NON AFRICAN AMERICAN): >60 ML/MIN/1.73 M^2
GLUCOSE SERPL-MCNC: 84 MG/DL
HCT VFR BLD AUTO: 37.4 %
HGB BLD-MCNC: 12.8 G/DL
LYMPHOCYTES # BLD AUTO: 1.4 K/UL
LYMPHOCYTES NFR BLD: 26.4 %
MCH RBC QN AUTO: 32.8 PG
MCHC RBC AUTO-ENTMCNC: 34.2 G/DL
MCV RBC AUTO: 96 FL
MONOCYTES # BLD AUTO: 0.6 K/UL
MONOCYTES NFR BLD: 10.6 %
NEUTROPHILS # BLD AUTO: 3 K/UL
NEUTROPHILS NFR BLD: 58.5 %
PLATELET # BLD AUTO: 145 K/UL
PMV BLD AUTO: 9.3 FL
POTASSIUM SERPL-SCNC: 3.9 MMOL/L
PROT SERPL-MCNC: 6.8 G/DL
RBC # BLD AUTO: 3.9 M/UL
SODIUM SERPL-SCNC: 144 MMOL/L
WBC # BLD AUTO: 5.18 K/UL

## 2018-02-19 PROCEDURE — 36415 COLL VENOUS BLD VENIPUNCTURE: CPT | Mod: PO

## 2018-02-19 PROCEDURE — 1125F AMNT PAIN NOTED PAIN PRSNT: CPT | Mod: S$GLB,,, | Performed by: PHYSICIAN ASSISTANT

## 2018-02-19 PROCEDURE — 96372 THER/PROPH/DIAG INJ SC/IM: CPT | Mod: JG,S$GLB,, | Performed by: INTERNAL MEDICINE

## 2018-02-19 PROCEDURE — 3008F BODY MASS INDEX DOCD: CPT | Mod: S$GLB,,, | Performed by: PHYSICIAN ASSISTANT

## 2018-02-19 PROCEDURE — 1159F MED LIST DOCD IN RCRD: CPT | Mod: S$GLB,,, | Performed by: PHYSICIAN ASSISTANT

## 2018-02-19 PROCEDURE — 99999 PR PBB SHADOW E&M-EST. PATIENT-LVL V: CPT | Mod: PBBFAC,,, | Performed by: PHYSICIAN ASSISTANT

## 2018-02-19 PROCEDURE — 85651 RBC SED RATE NONAUTOMATED: CPT | Mod: PO

## 2018-02-19 PROCEDURE — 80053 COMPREHEN METABOLIC PANEL: CPT | Mod: PO

## 2018-02-19 PROCEDURE — 86140 C-REACTIVE PROTEIN: CPT

## 2018-02-19 PROCEDURE — 85025 COMPLETE CBC W/AUTO DIFF WBC: CPT | Mod: PO

## 2018-02-19 PROCEDURE — 99214 OFFICE O/P EST MOD 30 MIN: CPT | Mod: SA,25,S$GLB, | Performed by: PHYSICIAN ASSISTANT

## 2018-02-19 RX ORDER — HYDROXYCHLOROQUINE SULFATE 200 MG/1
200 TABLET, FILM COATED ORAL 2 TIMES DAILY
Qty: 60 TABLET | Refills: 6 | Status: SHIPPED | OUTPATIENT
Start: 2018-02-19 | End: 2018-08-20

## 2018-02-19 NOTE — PROGRESS NOTES
Subjective:       Patient ID: Cori Avalos is a 67 y.o. female.    Chief Complaint: Sarcoidosis; spondylosis; Osteoarthritis; chronic steroids; and immunocompromised    Cori is back today for rheumatology visit.     She has sarcoidosis with mild lung and cutaneous involvement (DX by skin biopsy initially). In 2013 she had a sarcoidosis flare up with elevated inflammatory parameters, elevated ACE level increase joint pain and swelling as well as increased nodules in her skin. mtx was started and she did better for a while but she had so many side effects for fatigue, malaise and gi upset she preferred to stop. Also tried Minocycline which caused dizziness she was intolerant of this. She is still Plaquenil BID.     We did try doxycycline 100 mg bid but when she had issues with mtx both were stopped to try and figure out the cause. This has not been resumed. She had increasing nodules on her skin. Set her up with derm and had skin biopsy right  Wrist.  Neg for sarcoid. Nodules consistent with lipoma, just a fatty tumor.   She's not had any fevers, no pleuritic chest pain, no other issues.  Minimal joint pain pain level 6/10.  She's currently on Plaquenil 200 mg twice daily and prednisone 2 mg daily.  Tried to wean down and off of prednisone but unable to get off completely. 2 mg seems to keep her feeling the best.     She does have some mild interstitial lung disease with some left lower linear scarring. Sees pulmonology Q year. Will see them this spril. Last repeat CT stable.  Her breathing is stable.      Had left knee replacement about 1 year ago.  Knee healing well.      Osteoporosis fractures in the past, failed bisphosphonate's, treated forteo X 24 months. Now on PROLIA every 6 months since 2013. Last prolia 8/14/17 due again today.    Repeat dexa 8/14/17 still osteoporosis but improved at spine and stable at hip. 2015 sustained right distal radial fx and L4 compression fx requiring kyphoplasty. No subsequent  "fx since then.           Sarcoidosis   Associated symptoms include fatigue and a rash. Pertinent negatives include no abdominal pain, arthralgias, chest pain, chills, fever, joint swelling, myalgias, nausea, neck pain, vomiting or weakness.   Osteoarthritis   Associated symptoms include fatigue and a rash. Pertinent negatives include no abdominal pain, arthralgias, chest pain, chills, fever, joint swelling, myalgias, nausea, neck pain, vomiting or weakness.     Review of Systems   Constitutional: Positive for fatigue. Negative for activity change, appetite change, chills and fever.   HENT: Negative.  Negative for mouth sores and trouble swallowing.         No dry mouth   Eyes: Negative.  Negative for photophobia, pain and redness.        No swollen or red eyes, no dry eye     Respiratory: Positive for shortness of breath. Negative for chest tightness, wheezing and stridor.         Stable  Able to exercise regularly    Cardiovascular: Negative.  Negative for chest pain.   Gastrointestinal: Negative.  Negative for abdominal pain, blood in stool, diarrhea, nausea and vomiting.   Genitourinary: Negative.  Negative for dysuria, frequency, hematuria and urgency.   Musculoskeletal: Negative for arthralgias, back pain, gait problem, joint swelling, myalgias, neck pain and neck stiffness.   Skin: Positive for rash. Negative for color change and pallor.   Neurological: Positive for dizziness. Negative for weakness.   Hematological: Negative for adenopathy.   Psychiatric/Behavioral: Negative for suicidal ideas.         Objective:     BP (!) 149/75   Pulse 71   Ht 5' 2" (1.575 m)   Wt 101.8 kg (224 lb 6.9 oz)   BMI 41.05 kg/m²      Physical Exam   Constitutional: She is oriented to person, place, and time and well-developed, well-nourished, and in no distress. No distress.   HENT:   Head: Normocephalic and atraumatic.   Right Ear: External ear normal.   Left Ear: External ear normal.   Mouth/Throat: No oropharyngeal " exudate.   Eyes: Conjunctivae and EOM are normal. Pupils are equal, round, and reactive to light. No scleral icterus.   Neck: Normal range of motion. Neck supple. No thyromegaly present.   Cardiovascular: Normal rate, regular rhythm and normal heart sounds.    No murmur heard.  Pulmonary/Chest: Effort normal and breath sounds normal. She exhibits no tenderness.   Abdominal: Soft. Bowel sounds are normal.       Right Side Rheumatological Exam     Examination finds the shoulder, elbow, wrist, knee, 1st PIP, 1st MCP, 2nd PIP, 2nd MCP, 3rd PIP, 3rd MCP, 4th PIP, 4th MCP, 5th PIP and 5th MCP normal.    Muscle Strength (0-5 scale):  Deltoid:  5  Biceps: 5/5   Triceps:  5  : 5/5   Iliopsoas: 4.6  Quadriceps:  4.8   Distal Lower Extremity: 4.8    Left Side Rheumatological Exam     Examination finds the shoulder, elbow, wrist, knee, 1st PIP, 1st MCP, 2nd PIP, 2nd MCP, 3rd PIP, 3rd MCP, 4th PIP, 4th MCP, 5th PIP and 5th MCP normal.    Muscle Strength (0-5 scale):  Deltoid:  5  Biceps: 5/5   Triceps:  5  :  5/5   Iliopsoas: 4.2  Quadriceps:  4.8   Distal Lower Extremity: 4.8      Lymphadenopathy:     She has no cervical adenopathy.   Neurological: She is alert and oriented to person, place, and time. She displays normal reflexes. No cranial nerve deficit. She exhibits normal muscle tone. Gait normal.   Skin: Skin is warm and dry. Rash noted.          Multiple subcutaneous nodules on her skin bilateral arms, abdomen and legs   Musculoskeletal: Normal range of motion. She exhibits no edema.                  Recent Results (from the past 336 hour(s))   CBC auto differential    Collection Time: 02/19/18  9:51 AM   Result Value Ref Range    WBC 5.18 3.90 - 12.70 K/uL    RBC 3.90 (L) 4.00 - 5.40 M/uL    Hemoglobin 12.8 12.0 - 16.0 g/dL    Hematocrit 37.4 37.0 - 48.5 %    MCV 96 82 - 98 fL    MCH 32.8 (H) 27.0 - 31.0 pg    MCHC 34.2 32.0 - 36.0 g/dL    RDW 12.8 11.5 - 14.5 %    Platelets 145 (L) 150 - 350 K/uL    MPV 9.3 9.2  - 12.9 fL    Gran # (ANC) 3.0 1.8 - 7.7 K/uL    Lymph # 1.4 1.0 - 4.8 K/uL    Mono # 0.6 0.3 - 1.0 K/uL    Eos # 0.2 0.0 - 0.5 K/uL    Baso # 0.02 0.00 - 0.20 K/uL    Gran% 58.5 38.0 - 73.0 %    Lymph% 26.4 18.0 - 48.0 %    Mono% 10.6 4.0 - 15.0 %    Eosinophil% 4.1 0.0 - 8.0 %    Basophil% 0.4 0.0 - 1.9 %    Differential Method Automated    Comprehensive metabolic panel    Collection Time: 02/19/18  9:51 AM   Result Value Ref Range    Sodium 144 136 - 145 mmol/L    Potassium 3.9 3.5 - 5.1 mmol/L    Chloride 104 95 - 110 mmol/L    CO2 28 23 - 29 mmol/L    Glucose 84 70 - 110 mg/dL    BUN, Bld 16 8 - 23 mg/dL    Creatinine 0.8 0.5 - 1.4 mg/dL    Calcium 10.0 8.7 - 10.5 mg/dL    Total Protein 6.8 6.0 - 8.4 g/dL    Albumin 3.8 3.5 - 5.2 g/dL    Total Bilirubin 1.1 (H) 0.1 - 1.0 mg/dL    Alkaline Phosphatase 90 55 - 135 U/L    AST 20 10 - 40 U/L    ALT 18 10 - 44 U/L    Anion Gap 12 8 - 16 mmol/L    eGFR if African American >60 >60 mL/min/1.73 m^2    eGFR if non African American >60 >60 mL/min/1.73 m^2       DEXA 8/14/17   TF mean 0.779 g/cm2 with T score -1.8  Right FN 0.685 g/cm2 with T score -2.5  L1-L4 spine 0.953 g/cm2 with T score -1.9  Osteoporosis stable hip, improved at spine      DEXA TF -1.8, FN -2.5, spine -2.3- osteoporosis with falling bmd     Assessment:       1. Sarcoid    2. Osteoporosis, postmenopausal    3. Primary osteoarthritis involving multiple joints    4. Long term current use of systemic steroids    5. High risk medication use        1.  Sarcoidosis with pulmonary and skin involvement (biopsy proven in the past)  -- now off methotrexate, on plaquenil bid and low dose prednisone 2 mg  Intolerant of minocycline.      Currently stable on Plaquenil 200 mg twice daily and low dose prednisone    Skin nodules with recent  biopsy consistent with lipoma not sarcoid nodules     2.  Status post total left knee arthroscopy doing well    3.  Degenerative disc disease and lumbar spine with lumbar  radiculopathy -with chronic pain seen pain management on oxycodone    4.  Long term steroid use and osteoporosis currently on treatment with prolia due today  Last  DEXA 8/2017 stable and improved but still OP- prolia continued     7.  Medication monitoring--no toxicity issues with her medication    8.  Chronic immunosuppression-no issues with recurrent infections    9.  Osteoporosis in setting of long term steroid use- on prolia last done 1/30/17 due again early august with repeat dexa due     10.  Mild scarring of the lung monitored by pulmonology to have follow-up visit soon with her pulmonary provider  repeat CT 2017 stable     Plan:         Reassurance  For now will remain off mtx, consider imuran or arava if need to add dmard  For now plaquenil 200 mg bid  keep prednisone to 2 mg daily     Ok for prolia today and repeat in 6 months  Keep her on vit D daily  Repeat dexa in 2 years 8/2019    Follow up with pulmonary  This spring, send us copy of clinic note    rtc 6 mon with dr allen with labs (reg 4, ace), prolia     Call with any questions, changes, or concerns                    Copy Dr Arnoldo Miller

## 2018-02-19 NOTE — LETTER
February 19, 2018      Keith Balderrama MD  [address] [facility] [place] [address]           [facility] - Rheumatology  [address] [place] [place] [address]  Phone: [phone]  Fax: [phone]          Patient: Cori Avalos   MR Number: [id number]   YOB: 1950   Date of Visit: 2/19/2018       Dear Dr. Keith Balderrama:    Thank you for referring Cori Avalos to me for evaluation. Attached you will find relevant portions of my assessment and plan of care.    If you have questions, please do not hesitate to call me. I look forward to following Cori Avalos along with you.    Sincerely,    Ariana Quigley PA-C    Enclosure  CC:  No Recipients    If you would like to receive this communication electronically, please contact [email or web address] or [phone] to request more information on [employer] access.    For providers and/or their staff who would like to refer a patient to [facility], please contact us through our one-stop-shop provider referral line, [facility], at [phone].    If you feel you have received this communication in error or would no longer like to receive these types of communications, please e-mail [email or web address]

## 2018-02-19 NOTE — PROGRESS NOTES
Administered 1 cc Prolia 60mg/cc  to LLQ of abdomen. Pt tolerated well. No acute reaction noted to site. Pt instructed on S/S to report. Advised patient to wait in lobby 15 minutes after receiving injection to monitor for any reactions. Pt verbalized understanding.       Calcium: 10.0  Creatinine: 0.8  Lot: [id number]  Exp: 06/20

## 2018-03-21 ENCOUNTER — TELEPHONE (OUTPATIENT)
Dept: RHEUMATOLOGY | Facility: CLINIC | Age: 68
End: 2018-03-21

## 2018-03-21 NOTE — TELEPHONE ENCOUNTER
----- Message from Halina Peguero sent at 3/21/2018 10:00 AM CDT -----  Contact: self [phone]  States that she is calling to speak to nurse regarding getting off of the plaquenel. Please call back at [phone]//thank you acc

## 2018-03-21 NOTE — TELEPHONE ENCOUNTER
Ariana, pt wants to know if she can go ahead and stop plaquenil ? And if theres anything else she can stop. Her medication is costing her a lot.

## 2018-03-21 NOTE — TELEPHONE ENCOUNTER
Plaquenil is the only dmard-sarcoid treatment she is taking  Would prefer her not stopping plaquenil    There are severl things she can cut back and wean off of like amitriptylline, cymbalta, valium, nexium, zofran, requip and zanaflex     Best if she is seen by either her pcp to discuss what to wean down and stop 1st    As far as plaquenil continue this until she sees dr anderson, can be seen sooner if she would like

## 2018-03-22 NOTE — TELEPHONE ENCOUNTER
Called pt to discuss her concerns. No answer message left for her to call if she wishes to come in earlier than august to discuss stop her medications

## 2018-03-23 ENCOUNTER — TELEPHONE (OUTPATIENT)
Dept: RHEUMATOLOGY | Facility: CLINIC | Age: 68
End: 2018-03-23

## 2018-03-23 NOTE — TELEPHONE ENCOUNTER
----- Message from John Luke sent at 3/23/2018  8:27 AM CDT -----  Contact: pt  She's calling in regards to a missed call, [phone] (home)

## 2018-04-10 ENCOUNTER — TELEPHONE (OUTPATIENT)
Dept: RHEUMATOLOGY | Facility: CLINIC | Age: 68
End: 2018-04-10

## 2018-04-10 NOTE — TELEPHONE ENCOUNTER
Patient states that she is hurting all over and would like labs done. She is scheduled for 4-24 for follow up  . She is requesting labs be drawn including a ACE. Please advise.

## 2018-04-10 NOTE — TELEPHONE ENCOUNTER
----- Message from Alida Woods sent at 4/10/2018  2:52 PM CDT -----  Contact: pt   States she's calling to get a blood test for her ace levels due to being in a lot of pain and can be reached at [phone]//thanks/dbw

## 2018-04-17 ENCOUNTER — OFFICE VISIT (OUTPATIENT)
Dept: RHEUMATOLOGY | Facility: CLINIC | Age: 68
End: 2018-04-17
Payer: COMMERCIAL

## 2018-04-17 VITALS
BODY MASS INDEX: 41.91 KG/M2 | DIASTOLIC BLOOD PRESSURE: 73 MMHG | SYSTOLIC BLOOD PRESSURE: 151 MMHG | WEIGHT: 227.75 LBS | HEIGHT: 62 IN | HEART RATE: 62 BPM

## 2018-04-17 DIAGNOSIS — M81.0 OSTEOPOROSIS, POSTMENOPAUSAL: Chronic | ICD-10-CM

## 2018-04-17 DIAGNOSIS — Z79.52 LONG TERM CURRENT USE OF SYSTEMIC STEROIDS: Chronic | ICD-10-CM

## 2018-04-17 DIAGNOSIS — Z79.899 HIGH RISK MEDICATION USE: ICD-10-CM

## 2018-04-17 DIAGNOSIS — M47.26 OSTEOARTHRITIS OF SPINE WITH RADICULOPATHY, LUMBAR REGION: Chronic | ICD-10-CM

## 2018-04-17 DIAGNOSIS — M15.9 PRIMARY OSTEOARTHRITIS INVOLVING MULTIPLE JOINTS: ICD-10-CM

## 2018-04-17 DIAGNOSIS — D86.9 SARCOID: Primary | Chronic | ICD-10-CM

## 2018-04-17 DIAGNOSIS — Z79.01 LONG TERM CURRENT USE OF ANTICOAGULANT: Chronic | ICD-10-CM

## 2018-04-17 PROCEDURE — 99999 PR PBB SHADOW E&M-EST. PATIENT-LVL III: CPT | Mod: PBBFAC,,, | Performed by: INTERNAL MEDICINE

## 2018-04-17 PROCEDURE — 99214 OFFICE O/P EST MOD 30 MIN: CPT | Mod: S$GLB,,, | Performed by: INTERNAL MEDICINE

## 2018-04-17 PROCEDURE — 3077F SYST BP >= 140 MM HG: CPT | Mod: CPTII,S$GLB,, | Performed by: INTERNAL MEDICINE

## 2018-04-17 PROCEDURE — 3078F DIAST BP <80 MM HG: CPT | Mod: CPTII,S$GLB,, | Performed by: INTERNAL MEDICINE

## 2018-04-17 NOTE — PATIENT INSTRUCTIONS
Lower prednisone to 1 mg day    Lower plaquenil to 1 day    No change in vitamin D    Prolia in August

## 2018-04-20 NOTE — PROGRESS NOTES
RHEUMATOLOGY FOLLOWUP    CHIEF COMPLAINT:  Sarcoid with ongoing pains in wrist joint areas.     PERTINENT HISTORY:  Mrs. Avalos has been followed in Rheumatology for a number of   years for sarcoidosis.  She is felt to have mild involvement of her lungs.    That has been questioned recently by Pulmonary.  She has had continuous   involvement in the past, but a recent skin biopsy was positive only for lipoma   and no sarcoid nodules.  She has not had other features of active sarcoid   recently.  Several other physicians had told her they do not believe she   actually has sarcoid.  Mrs. Avalos is confused about this and not sure where to   go. She presently has ongoing knee problems.  She has had a left knee   replacement in the past and ortho is seeing her right knee now and they want to   do surgery there as well.  She also has a right rotator cuff tear and going to   therapy.  She is using a cane a good bit more because of her knee.  She fell and   had a concussion in December with poor balance.  She did not have any fractures   and has a past history of compression fracture and is on Prolia now.  She does   see Pain Management for chronic back pain and sciatica.  She gets shots   intermittently.  Overall she is confused where she should go with her meds in   the future.  She is presently on Plaquenil 200 mg twice daily as well as   prednisone 2 mg a day.      REVIEW OF SYSTEMS:  GENERAL:  Her weight is actually stable.  She is not having in general no fever,   chills or sweats.    HEENT:  With no active symptoms or eye inflammation, parotids or submandibular   swelling.  Mild symptoms of dryness.   CARDIAC:  See HPI.  She is having hypertension and arrhythmias.  A question   whether the latter could be from sarcoid in the past.  She is on meds.   PULMONARY:  She has pulmonary scar tissue, which is stable.  Not clear at   present whether she has any active sarcoid.   HEMATOLOGIC:  She   is on chronic Eliquis, does  occasionally   bruise.  GASTROINTESTINAL:  No recent nausea or vomiting.  BONES:  Osteoporosis on Prolia as mentioned for her fracture.  BACK:  Positive for chronic back pain and sciatica, sees pain management.  VASCULAR:  Negative for any recent claudication or Raynauds.    MUSCULAR:  No progressive weakness, no myopathy from sarcoid.  SKIN:  She has multiple nodules into her skin.  They have been told possibly   sarcoid in the past, but recent biopsy was negative.  See her last note.    Past Medical History:   Diagnosis Date    AC (acromioclavicular) joint bone spurs     Acid reflux 4/14/2011    Adrenal insufficiency     Bilateral lower extremity edema     Depression     Encounter for blood transfusion 1978    Factor V Leiden     History of supraventricular tachycardia     Hypertension     IBS (irritable bowel syndrome)     Long term current use of anticoagulant     Long term current use of systemic steroids     Meralgia paresthetica of left side     Osteoarthritis     Osteoporosis     Osteoporosis, post-menopausal     Restless leg syndrome     S/P ablation operation for arrhythmia     Sarcoidosis     Sleep apnea     cpap     Past Surgical History:   Procedure Laterality Date    ablation      tachycardia    APPENDECTOMY      BACK SURGERY      CARDIAC ELECTROPHYSIOLOGY STUDY AND ABLATION      CARPAL TUNNEL RELEASE      cataracts      CHOLECYSTECTOMY      COLECTOMY      COLON SURGERY      ECTOPIC PREGNANCY SURGERY      evacuation of ectopic pregnancy      EYE SURGERY      cataracts with iol    EYE SURGERY      FRACTURE SURGERY      Total knee replacement     HERNIA REPAIR      left knee replacemant      LEG SURGERY      lazer to leg    LEG SURGERY      right knee replacement      sinus cleaning      SPINE SURGERY      TONSILLECTOMY      TUBAL LIGATION      uterine suspension      VEIN LIGATION AND STRIPPING       Family History   Problem Relation Age of Onset    Cancer  Mother     Breast cancer Mother 65    Heart disease Father     Stroke Father     Hypertension Father     Hypertension Sister     Diabetes Daughter      Social History     Social History    Marital status:      Spouse name: N/A    Number of children: N/A    Years of education: N/A     Occupational History    Not on file.     Social History Main Topics    Smoking status: Never Smoker    Smokeless tobacco: Never Used    Alcohol use No    Drug use: No    Sexual activity: Not on file     Other Topics Concern    Not on file     Social History Narrative    ** Merged History Encounter **          Review of patient's allergies indicates:   Allergen Reactions    Neurontin [gabapentin] Edema    Penicillins Hives    Ceftin [cefuroxime axetil] Nausea And Vomiting    Dilaudid [hydromorphone]     Latex, natural rubber Hives    Neurontin [gabapentin] Swelling    Ceftin [cefuroxime axetil] Rash    Penicillins Rash         PHYSICAL EXAMINATION:  VITAL SIGNS:  Exam today with blood pressure 151/73, pulse in the 60s, weight   103 kilograms, BMI of 41, pain score 5, which is mostly in her back than legs.  HEENT:  With no active redness or tenderness.  No swelling or tenderness in the   parotids.  Notes nodes in her neck.  Normocephalic, atraumatic, alert and   oriented X3.  PERRLA.  Conjunctiva clear, sclera  non icteric.  No tonsillar   enlargement.  No pharyngeal erythema or exudate.  No ulcers or lesions noted.    Mucous membranes moist and pink.  Oral pharynx clear.  Neck supple, no JVD.    Normal ROM.  Thyroid normal.  No masses or tracheal deviation.  No cervical,   axillary or inguinal lymph node enlargement.  CHEST:  Lungs clear to auscultation and percussion.  CARDIOVASCULAR:  Normal rhythm.  Normal S1, S2.  No gallops today.  No murmurs.   ABDOMEN:  Mildly obese, but not tender. No organomegaly.   MUSCULOSKELETAL:  Muscle strength 5/5 in upper and lower extremities.   No joint   exam peripherally  with no heat, redness or swelling of small nodes of her   hands.  No sarcoid dactylitis.  Knees with OA, the left has been replaced.    Right is tender.  No effusion.  Pulses are normal.  SKIN:  With multiple small lipomatous nodules noted in the arms and legs.  No   overlying erythema or tenderness.     LABORATORY DATA:  With white count 5100 stable; hematocrit is 37.5% stable.    Platelets 145, minimal decrease.  Sed rate is 23.  Electrolytes and renal   function normal.  LFTs normal. CRP 17.  Recent CT of the head in December does   not show any abnormalities other than old ischemic changes.      IMPRESSIONS:   1.  Sarcoid by history with possible skin and lung involvement.  Presently these   do not appear to be active, and I will certainly agree with doctors that she   does not have active sarcoid at this time.  2.  Osteoarthritis, particularly involving her knees.   3.  Osteoporosis on Prolia with prior fractures.  4.  Chronic steroid use.  5.  Chronic anticoagulant use.    PLAN:   1.  We will go ahead and put her to the test and take her off steroids.  We will   load her with 1 mg today for the next several months when she is here for her   Prolia and see if she comes off.  2.  Continue Prolia, due in August.  Get routine BMP.  3.  Lower Plaquenil to just 1 daily.  4.  Continue vitamin D.  5.  Continue followup with Pain Management doctors, cardiologist, pulmonologist   as we try to sort this out.  I will discuss with her about the diagnosis and see   if we can figure this out.            /mattie [id number] kashmir(s)            MARIA ISABEL  dd: 04/23/2018 22:02:24 (CDT)  td: 04/24/2018 02:02:03 (CDT)  Doc ID   #[id number]  Job ID #[id number]    CC:     Prolonged visit: Over35 min spent in patient care and evaluation. Over  20 mintime used in reviewing recent labs and symptoms, discussing diagnostic possibilities- does she have sarcoid or not, counseling on medication options, explaining complicationsof therapies vs disease, and  coordination of care with  Family  /Pulm/  Cardiology. Patient's questions were all addressed and she understands  And agrees with our plans and risks.

## 2018-06-19 ENCOUNTER — HOSPITAL ENCOUNTER (OUTPATIENT)
Dept: RADIOLOGY | Facility: HOSPITAL | Age: 68
Discharge: HOME | End: 2018-06-19
Attending: INTERNAL MEDICINE
Payer: COMMERCIAL

## 2018-06-19 DIAGNOSIS — R93.89 ABNORMAL CXR (CHEST X-RAY): ICD-10-CM

## 2018-06-19 PROCEDURE — 71046 X-RAY EXAM CHEST 2 VIEWS: CPT | Mod: 26,,, | Performed by: RADIOLOGY

## 2018-06-19 PROCEDURE — 71046 X-RAY EXAM CHEST 2 VIEWS: CPT | Mod: TC,FY,PO

## 2018-08-10 ENCOUNTER — TELEPHONE (OUTPATIENT)
Dept: RHEUMATOLOGY | Facility: CLINIC | Age: 68
End: 2018-08-10

## 2018-08-10 NOTE — TELEPHONE ENCOUNTER
Spoke with pt and she wanted to discuss with Ariana about not coming to [facility] anymore. Pt states the heart hospital in [place] recently closed and she has had to change a lot of her doctors and she does not want to see any more new doctors. States that her GYN can probably give her the prolia injections, she was recently advised she may no longer have sarcoid, states that she is off of the prednisone and is on 200 mg plaquenil daily. Please Advise.

## 2018-08-10 NOTE — TELEPHONE ENCOUNTER
----- Message from Marge Perez sent at 8/10/2018 10:59 AM CDT -----  Contact: pt  She's calling in regards to missed call pls call pt back at [phone] (home)

## 2018-08-10 NOTE — TELEPHONE ENCOUNTER
Spoke with pt and advised her of below, rescheduled appointment to 8.20.18 at 8.30 am with Ariana. Pt verbalized understanding.

## 2018-08-10 NOTE — TELEPHONE ENCOUNTER
----- Message from Zonia Sanz sent at 8/10/2018 10:43 AM CDT -----  Contact: Pt   Caller request call back wants to ask questions about switching doctors. .[phone] (home)

## 2018-08-10 NOTE — TELEPHONE ENCOUNTER
Ok    I would recommend she does see a rheumatologist at least yearly to follow her sarcoid even though in remission can become active again    Ok she can check with her gyn to see if they will take over prolia   Her last prolia was done 2/19/18 and is due again after 8/19/18- important for compliance with prolia so she needs to get established quickly or let use do her next prolia then change over the her gyn for her next dose due  in feb 2019      I have openings on 8/20/18 if she just wants to see me for her prolia we can do that - can change apt  from dr allen to me so she will not have to see anyone new     Dr knott still  Prn also

## 2018-08-13 ENCOUNTER — LAB VISIT (OUTPATIENT)
Dept: LAB | Facility: HOSPITAL | Age: 68
End: 2018-08-13
Attending: PHYSICIAN ASSISTANT
Payer: MEDICARE

## 2018-08-13 DIAGNOSIS — D86.9 SARCOID: Chronic | ICD-10-CM

## 2018-08-13 DIAGNOSIS — Z79.52 LONG TERM CURRENT USE OF SYSTEMIC STEROIDS: Chronic | ICD-10-CM

## 2018-08-13 DIAGNOSIS — M81.0 OSTEOPOROSIS, POSTMENOPAUSAL: Chronic | ICD-10-CM

## 2018-08-13 LAB
25(OH)D3+25(OH)D2 SERPL-MCNC: 35 NG/ML
ALBUMIN SERPL BCP-MCNC: 3.9 G/DL
ALP SERPL-CCNC: 91 U/L
ALT SERPL W/O P-5'-P-CCNC: 18 U/L
ANION GAP SERPL CALC-SCNC: 10 MMOL/L
AST SERPL-CCNC: 15 U/L
BASOPHILS # BLD AUTO: 0.03 K/UL
BASOPHILS NFR BLD: 0.4 %
BILIRUB SERPL-MCNC: 0.8 MG/DL
BILIRUB UR QL STRIP: NEGATIVE
BUN SERPL-MCNC: 20 MG/DL
CALCIUM SERPL-MCNC: 10.1 MG/DL
CHLORIDE SERPL-SCNC: 104 MMOL/L
CLARITY UR: CLEAR
CO2 SERPL-SCNC: 29 MMOL/L
COLOR UR: YELLOW
CREAT SERPL-MCNC: 0.8 MG/DL
CRP SERPL-MCNC: 8.3 MG/L
DIFFERENTIAL METHOD: ABNORMAL
EOSINOPHIL # BLD AUTO: 0.1 K/UL
EOSINOPHIL NFR BLD: 1.6 %
ERYTHROCYTE [DISTWIDTH] IN BLOOD BY AUTOMATED COUNT: 12.7 %
ERYTHROCYTE [SEDIMENTATION RATE] IN BLOOD BY WESTERGREN METHOD: 21 MM/HR
EST. GFR  (AFRICAN AMERICAN): >60 ML/MIN/1.73 M^2
EST. GFR  (NON AFRICAN AMERICAN): >60 ML/MIN/1.73 M^2
GLUCOSE SERPL-MCNC: 90 MG/DL
GLUCOSE UR QL STRIP: NEGATIVE
HCT VFR BLD AUTO: 38.9 %
HGB BLD-MCNC: 13.5 G/DL
HGB UR QL STRIP: NEGATIVE
KETONES UR QL STRIP: NEGATIVE
LEUKOCYTE ESTERASE UR QL STRIP: NEGATIVE
LYMPHOCYTES # BLD AUTO: 1.3 K/UL
LYMPHOCYTES NFR BLD: 18.8 %
MCH RBC QN AUTO: 33.4 PG
MCHC RBC AUTO-ENTMCNC: 34.7 G/DL
MCV RBC AUTO: 96 FL
MONOCYTES # BLD AUTO: 0.8 K/UL
MONOCYTES NFR BLD: 11 %
NEUTROPHILS # BLD AUTO: 4.8 K/UL
NEUTROPHILS NFR BLD: 68.2 %
NITRITE UR QL STRIP: NEGATIVE
PH UR STRIP: 6 [PH] (ref 5–8)
PLATELET # BLD AUTO: 198 K/UL
PMV BLD AUTO: 9.8 FL
POTASSIUM SERPL-SCNC: 4.3 MMOL/L
PROT SERPL-MCNC: 7.2 G/DL
PROT UR QL STRIP: NEGATIVE
RBC # BLD AUTO: 4.04 M/UL
SODIUM SERPL-SCNC: 143 MMOL/L
SP GR UR STRIP: 1.01 (ref 1–1.03)
URN SPEC COLLECT METH UR: NORMAL
WBC # BLD AUTO: 7.09 K/UL

## 2018-08-13 PROCEDURE — 82306 VITAMIN D 25 HYDROXY: CPT

## 2018-08-13 PROCEDURE — 85025 COMPLETE CBC W/AUTO DIFF WBC: CPT | Mod: PO

## 2018-08-13 PROCEDURE — 80053 COMPREHEN METABOLIC PANEL: CPT | Mod: PO

## 2018-08-13 PROCEDURE — 85651 RBC SED RATE NONAUTOMATED: CPT | Mod: PO

## 2018-08-13 PROCEDURE — 36415 COLL VENOUS BLD VENIPUNCTURE: CPT | Mod: PO

## 2018-08-13 PROCEDURE — 81003 URINALYSIS AUTO W/O SCOPE: CPT | Mod: PO

## 2018-08-13 PROCEDURE — 86140 C-REACTIVE PROTEIN: CPT

## 2018-08-14 ENCOUNTER — TELEPHONE (OUTPATIENT)
Dept: RHEUMATOLOGY | Facility: CLINIC | Age: 68
End: 2018-08-14

## 2018-08-14 NOTE — TELEPHONE ENCOUNTER
----- Message from Hortencia Lopes sent at 8/14/2018  8:38 AM CDT -----  Contact: Cori [phone]  Patient would like to know if her bone density and prolia records can be faxed to her PCP Dr. Rene Soto [phone].

## 2018-08-14 NOTE — TELEPHONE ENCOUNTER
Spoke with Mrs. Avalos informed her that I faxed a copy of the bone density and last clinic notes.

## 2018-08-20 ENCOUNTER — TELEPHONE (OUTPATIENT)
Dept: RHEUMATOLOGY | Facility: CLINIC | Age: 68
End: 2018-08-20

## 2018-08-20 ENCOUNTER — OFFICE VISIT (OUTPATIENT)
Dept: RHEUMATOLOGY | Facility: CLINIC | Age: 68
End: 2018-08-20
Payer: MEDICARE

## 2018-08-20 VITALS
WEIGHT: 230.63 LBS | HEART RATE: 58 BPM | HEIGHT: 62 IN | BODY MASS INDEX: 42.44 KG/M2 | DIASTOLIC BLOOD PRESSURE: 73 MMHG | SYSTOLIC BLOOD PRESSURE: 135 MMHG

## 2018-08-20 DIAGNOSIS — M75.101 ROTATOR CUFF TEAR ARTHROPATHY OF RIGHT SHOULDER: ICD-10-CM

## 2018-08-20 DIAGNOSIS — M81.0 OSTEOPOROSIS, POSTMENOPAUSAL: Chronic | ICD-10-CM

## 2018-08-20 DIAGNOSIS — M12.811 ROTATOR CUFF TEAR ARTHROPATHY OF RIGHT SHOULDER: ICD-10-CM

## 2018-08-20 DIAGNOSIS — D86.9 SARCOID: Primary | Chronic | ICD-10-CM

## 2018-08-20 DIAGNOSIS — Z79.01 LONG TERM CURRENT USE OF ANTICOAGULANT: Chronic | ICD-10-CM

## 2018-08-20 DIAGNOSIS — M17.0 PRIMARY OSTEOARTHRITIS OF BOTH KNEES: ICD-10-CM

## 2018-08-20 DIAGNOSIS — M47.26 OSTEOARTHRITIS OF SPINE WITH RADICULOPATHY, LUMBAR REGION: Chronic | ICD-10-CM

## 2018-08-20 DIAGNOSIS — E55.9 VITAMIN D DEFICIENCY: ICD-10-CM

## 2018-08-20 DIAGNOSIS — D68.51 FACTOR V LEIDEN: Chronic | ICD-10-CM

## 2018-08-20 PROCEDURE — 96372 THER/PROPH/DIAG INJ SC/IM: CPT | Mod: S$GLB,,, | Performed by: INTERNAL MEDICINE

## 2018-08-20 PROCEDURE — 99999 PR PBB SHADOW E&M-EST. PATIENT-LVL III: CPT | Mod: PBBFAC,,, | Performed by: INTERNAL MEDICINE

## 2018-08-20 PROCEDURE — 99214 OFFICE O/P EST MOD 30 MIN: CPT | Mod: 25,S$GLB,, | Performed by: INTERNAL MEDICINE

## 2018-08-20 RX ORDER — MONTELUKAST SODIUM 10 MG/1
10 TABLET ORAL NIGHTLY
COMMUNITY
End: 2019-09-10

## 2018-08-20 RX ORDER — TRAMADOL HYDROCHLORIDE 50 MG/1
50 TABLET ORAL EVERY 6 HOURS PRN
COMMUNITY
End: 2019-03-05

## 2018-08-20 RX ORDER — VIT C/E/ZN/COPPR/LUTEIN/ZEAXAN 250MG-90MG
1000 CAPSULE ORAL DAILY
COMMUNITY

## 2018-08-20 RX ORDER — HYDROXYCHLOROQUINE SULFATE 200 MG/1
200 TABLET, FILM COATED ORAL DAILY
Qty: 90 TABLET | Refills: 3 | Status: SHIPPED | OUTPATIENT
Start: 2018-08-20 | End: 2019-03-05

## 2018-08-20 RX ORDER — HYDROXYCHLOROQUINE SULFATE 200 MG/1
TABLET, FILM COATED ORAL DAILY
COMMUNITY
End: 2018-08-20 | Stop reason: SDUPTHER

## 2018-08-20 NOTE — PATIENT INSTRUCTIONS
Continue Plaquenil one day     Eye checks once year    Continue vit D3- 1000 units day    Calcium in diet    Exercise for bones    Preventing Falls  Each year about one-third of all persons over age 65 will fall. Many of these falls result in broken bones. Some common causes of falls include outdoor and indoor hazards. Certain lifestyle behaviors can also increase your chances of falling.  Outdoor Safety Tips  Try the following tips to help prevent falls when you are outside:  Wear low-heeled shoes with rubber soles for more solid footing (traction), and wear warm boots in winter.   Use hand rails as you go up and down steps and on escalators.   If sidewalks look slippery, walk in the grass for more solid footing.   In winter, carry a small bag of rock salt or kosher salt in your pocket or car. You can then sprinkle the salt or sergio litter on sidewalks or streets that are slippery.   Look carefully at floor surfaces in public buildings. Floors made of highly polished marble or tile can be very slippery. When these surfaces are wet, they may become dangerous. When floors have plastic or carpet runners in place, stay on them whenever possible.   Keep your porch, deck, walkways and driveway free of leaves, snow, trash or clutter. Also keep them in good repair. Cover porch steps with a gritty, weather-proof paint and install handrails on both sides.   Turn on the light outside your front door before leaving your home in the early evening so that you have outdoor light when you return after dark.   Use a shoulder bag, paulina pack or a backpack purse to leave your hands free.   Use a walker or cane as needed.   Find out about community services that can provide help, such as 24-hour pharmacies and grocery stores that take orders by phone or internet and deliver, especially in poor weather.   Stop at curbs and check the height before stepping up or down. Be careful at curbs that have been cut away to allow access for bikes  or wheelchairs. The incline may lead to a fall.   Consider wearing hip protectors or hip pads for added protection should you fall.   Indoor Safety Tips: Fall-Proofing Your Home  Try the following tips to help prevent falls when you are inside your home:  Around the House  Place items you use most often within easy reach. This keeps you from having to do a lot of bending and stooping.   Use assistive devices to help avoid strain or injury. For example, use a long-handled grasping device to  items without bending or reaching. Use a pushcart to move heavy or hot items from the stove or countertop to the table.   If you must use a stepstool, use a sturdy one with a handrail and wide steps.   If you live alone, consider wearing a personal emergency response system (PERS). Also consider having a cordless telephone or cell phone to take from room to room so you can call for help if you fall.   Floors  Remove all loose wires, cords and throw rugs.   Keep floors free of clutter.   Be sure all carpets and area rugs have skid-proof backing or are tacked to the floor.   Do not use slippery wax on bare floors.   Keep furniture in its usual place.   Bathrooms  Install grab bars on the bathroom walls beside the tub, shower and toilet.   Use a non-skid rubber mat in the shower or tub.   If you are unsteady on your feet, you may want to use a plastic chair with a back and non-skid legs in the shower or tub and use a handheld showerhead to bathe.   Kitchen  Use non-skid mats or rugs on the floor near the stove and sink.   Clean up spills as soon as they happen (in the kitchen and anywhere in the home).   Bedroom  Place light switches within reach of your bed and a night light between the bedroom and bathroom.   Get up slowly from sitting or lying down since this may cause dizziness.   Keep a flashlight with fresh batteries beside your bed.   Stairs  Keep stairwells well lit, with light switches at the top and the bottom.    Install sturdy handrails on both sides.   Ryan the top and bottom steps with bright tape.   Make sure carpeting is secure.   In addition to indoor and outdoor hazards, certain lifestyle behaviors can make a person more likely to fall. Here are some lifestyle tips to help you:   Be careful about drinking alcohol. Alcohol slows reflexes and may cause confusion, dizziness or disorientation. Too much alcohol can also cause bone loss.   If you are in a hurry, slow down. Accidents are more likely to happen when you rush.   Stay alert and focused when in public places.   Remember to wear appropriate shoes both indoors and out.   Exercise and eat healthy at every age. A healthy diet includes having a well-balanced diet that contains the recommended amounts of calcium and vitamin D.   If you have osteoporosis, you can take steps inside and outside your home and in your daily routine to prevent falls. Taking these steps can help you enjoy an active and healthy life.

## 2018-08-20 NOTE — PROGRESS NOTES
Administered 1cc Prolia 60mg/cc to right upper quad of abdomen. Pt tolerated well. No acute reaction noted at site. Pt instructed on S/S of reaction to report. Pt verbalized understanding. Patient waited 15 minutes post injection    Lot:[id number]  Exp.11/20  Manu:Ruth Ann    Calcium: 10.1  Creatinine: 0.8

## 2018-08-20 NOTE — LETTER
August 24, 2018      Ariana Quigley PA-C  [address]  [place] [address]           [facility] - Rheumatology  [address] [place] [address]  Phone: [phone]  Fax: [phone]          Patient: Cori Avalos   MR Number: [id number]   YOB: 1950   Date of Visit: 8/20/2018       Dear Ariana Quigley:    Thank you for referring Cori Avalos to me for evaluation. Attached you will find relevant portions of my assessment and plan of care.    If you have questions, please do not hesitate to call me. I look forward to following Cori Avalos along with you.    Sincerely,    Gal Winn  CC:  No Recipients    If you would like to receive this communication electronically, please contact [email or web address] or [phone] to request more information on "[employer] Link access.    For providers and/or their staff who would like to refer a patient to [facility], please contact us through our one-stop-shop provider referral line, Deborah Singh, at [phone].    If you feel you have received this communication in error or would no longer like to receive these types of communications, please e-mail [email or web address]

## 2018-08-20 NOTE — TELEPHONE ENCOUNTER
Spoke with Mrs. Avalos informed her that Ariana Quigley PA-C is out sick today and we will need to rescheduled her. Mrs. Avalos stated that she is already in route here and do not want to reschedule her appointment. Per Candance Mrs. Avalos scheduled with Dr. Balderrama for 10:30am and will be worked in when she arrive.

## 2018-08-27 NOTE — PROGRESS NOTES
RHEUMATOLOGY FOLLOWUP    CHIEF COMPLAINT:  Sarcoid and osteoporosis    PERTINENT HISTORY:  Mrs. Avalos has been followed in Rheumatology for a number of   years for sarcoidosis.  She is felt to have mild involvement of her lungs.    That has been questioned recently by Pulmonary.  She has had continuous   involvement in the past, but a recent skin biopsy was positive only for lipoma   and no sarcoid nodules.  She states she still has about the same number of lumps and bumps on occasion.  She does not feel that they are sarcoid at this time.  She is down to just 1 Plaquenil a day.  She has not had other features of active sarcoid   recently.  Several other physicians had told her they do not believe she   actually has sarcoid.  She actually feels pretty good about this    . She presently has ongoing knee problems.  She has had a left knee   replacement in the past and ortho is seeing her right knee now and they want to   do surgery there as well.  She also has a right rotator cuff tear and going to   therapy.  She is using a cane a good bit more because of her knee.  She fell and   had a concussion in December with poor balance.  She did not have any fractures   but has a past history of compression fracture and is on Prolia now.  She is due today as had no reactions to prior shots  She does   see Pain Management for chronic back pain and sciatica.  She gets shots   intermittently.  She will be seeing a lot of new physicians.  The doctors she were seeing have moved.  She would like to come here though for her Prolia injections if it is okay and certainly okay with us.  She is due for a DEXA in August of 2019    REVIEW OF SYSTEMS:  GENERAL:  Her weight is actually stable.  She is not having in general no fever,   chills or sweats.    HEENT:  With no active symptoms or eye inflammation, parotids or submandibular   swelling.  Mild symptoms of dryness.   CARDIAC:  See HPI.  She is having hypertension and arrhythmias.  A  question   whether the latter could be from sarcoid in the past.  She is on meds.   PULMONARY:  She has pulmonary scar tissue, which is stable.  Not clear at   present whether she has any active sarcoid.   HEMATOLOGIC:  She   is on chronic Eliquis, does occasionally   bruise.  GASTROINTESTINAL:  No recent nausea or vomiting.  BONES:  Osteoporosis on Prolia as mentioned for her fracture.  BACK:  Positive for chronic back pain and sciatica, sees pain management.  VASCULAR:  Negative for any recent claudication or Raynauds.    MUSCULAR:  No progressive weakness, no myopathy from sarcoid.  SKIN:  She has multiple nodules into her skin.  They have been told possibly   sarcoid in the past, but recent biopsy was negative.  See her last note.    Past Medical History:   Diagnosis Date    AC (acromioclavicular) joint bone spurs     Acid reflux 4/14/2011    Adrenal insufficiency     Bilateral lower extremity edema     Depression     Encounter for blood transfusion 1978    Factor V Leiden     History of supraventricular tachycardia     Hypertension     IBS (irritable bowel syndrome)     Long term current use of anticoagulant     Long term current use of systemic steroids     Meralgia paresthetica of left side     Osteoarthritis     Osteoporosis     Osteoporosis, post-menopausal     Restless leg syndrome     S/P ablation operation for arrhythmia     Sarcoidosis     Sleep apnea     cpap     Past Surgical History:   Procedure Laterality Date    ablation      tachycardia    APPENDECTOMY      BACK SURGERY      CARDIAC ELECTROPHYSIOLOGY STUDY AND ABLATION      CARPAL TUNNEL RELEASE      cataracts      CHOLECYSTECTOMY      COLECTOMY      COLON SURGERY      ECTOPIC PREGNANCY SURGERY      evacuation of ectopic pregnancy      EYE SURGERY      cataracts with iol    EYE SURGERY      FRACTURE SURGERY      Total knee replacement     HERNIA REPAIR      JOINT REPLACEMENT      left knee replacemant      LEG  "SURGERY      lazer to leg    LEG SURGERY      right knee replacement      sinus cleaning      SPINE SURGERY      TONSILLECTOMY      TUBAL LIGATION      uterine suspension      VEIN LIGATION AND STRIPPING       Family History   Problem Relation Age of Onset    Cancer Mother     Breast cancer Mother 65    Heart disease Father     Stroke Father     Hypertension Father     Hypertension Sister     Diabetes Daughter      Social History     Socioeconomic History    Marital status:      Spouse name: Not on file    Number of children: Not on file    Years of education: Not on file    Highest education level: Not on file   Social Needs    Financial resource strain: Not on file    Food insecurity - worry: Not on file    Food insecurity - inability: Not on file    Transportation needs - medical: Not on file    Transportation needs - non-medical: Not on file   Occupational History    Not on file   Tobacco Use    Smoking status: Never Smoker    Smokeless tobacco: Never Used   Substance and Sexual Activity    Alcohol use: No    Drug use: No    Sexual activity: Not on file   Other Topics Concern    Not on file   Social History Narrative    ** Merged History Encounter **          Review of patient's allergies indicates:   Allergen Reactions    Neurontin [gabapentin] Edema    Penicillins Hives    Ceftin [cefuroxime axetil] Nausea And Vomiting    Dilaudid [hydromorphone]     Latex, natural rubber Hives    Neurontin [gabapentin] Swelling    Ceftin [cefuroxime axetil] Rash    Penicillins Rash         PHYSICAL EXAMINATION:  VITAL SIGNS:  /73   Pulse (!) 58   Ht 5' 2" (1.575 m)   Wt 104.6 kg (230 lb 9.6 oz)   BMI 42.18 kg/m²      HEENT:  With no active redness or tenderness.  No swelling or tenderness in the   parotids.  Notes nodes in her neck.  Normocephalic, atraumatic, alert and   oriented X3.  PERRLA.  Conjunctiva clear, sclera  non icteric.  No tonsillar   enlargement.  No " pharyngeal erythema or exudate.  No ulcers or lesions noted.    Mucous membranes moist and pink.  Oral pharynx clear.  Neck supple, no JVD.    Normal ROM.  Thyroid normal.  No masses or tracheal deviation.  No cervical,   axillary or inguinal lymph node enlargement.  CHEST:  Lungs clear to auscultation and percussion.  CARDIOVASCULAR:  Normal rhythm.  Normal S1, S2.  No gallops today.  No murmurs.   ABDOMEN:  Mildly obese, but not tender. No organomegaly.   MUSCULOSKELETAL:  Muscle strength 5/5 in upper and lower extremities.   No joint   exam peripherally with no heat, redness or swelling of small nodes of her   hands.  No sarcoid dactylitis.  Knees with OA, the left has been replaced.    Right is tender.  No effusion.  Pulses are normal.  SKIN:  With multiple small lipomatous nodules noted in the arms and legs.  No   overlying erythema or tenderness.     Results for orders placed or performed in visit on 08/13/18   CBC auto differential   Result Value Ref Range    WBC 7.09 3.90 - 12.70 K/uL    RBC 4.04 4.00 - 5.40 M/uL    Hemoglobin 13.5 12.0 - 16.0 g/dL    Hematocrit 38.9 37.0 - 48.5 %    MCV 96 82 - 98 fL    MCH 33.4 (H) 27.0 - 31.0 pg    MCHC 34.7 32.0 - 36.0 g/dL    RDW 12.7 11.5 - 14.5 %    Platelets 198 150 - 350 K/uL    MPV 9.8 9.2 - 12.9 fL    Gran # (ANC) 4.8 1.8 - 7.7 K/uL    Lymph # 1.3 1.0 - 4.8 K/uL    Mono # 0.8 0.3 - 1.0 K/uL    Eos # 0.1 0.0 - 0.5 K/uL    Baso # 0.03 0.00 - 0.20 K/uL    Gran% 68.2 38.0 - 73.0 %    Lymph% 18.8 18.0 - 48.0 %    Mono% 11.0 4.0 - 15.0 %    Eosinophil% 1.6 0.0 - 8.0 %    Basophil% 0.4 0.0 - 1.9 %    Differential Method Automated    Comprehensive metabolic panel   Result Value Ref Range    Sodium 143 136 - 145 mmol/L    Potassium 4.3 3.5 - 5.1 mmol/L    Chloride 104 95 - 110 mmol/L    CO2 29 23 - 29 mmol/L    Glucose 90 70 - 110 mg/dL    BUN, Bld 20 8 - 23 mg/dL    Creatinine 0.8 0.5 - 1.4 mg/dL    Calcium 10.1 8.7 - 10.5 mg/dL    Total Protein 7.2 6.0 - 8.4 g/dL     Albumin 3.9 3.5 - 5.2 g/dL    Total Bilirubin 0.8 0.1 - 1.0 mg/dL    Alkaline Phosphatase 91 55 - 135 U/L    AST 15 10 - 40 U/L    ALT 18 10 - 44 U/L    Anion Gap 10 8 - 16 mmol/L    eGFR if African American >60 >60 mL/min/1.73 m^2    eGFR if non African American >60 >60 mL/min/1.73 m^2   C-reactive protein   Result Value Ref Range    CRP 8.3 (H) 0.0 - 8.2 mg/L   Sedimentation rate, manual   Result Value Ref Range    Sed Rate 21 (H) 0 - 20 mm/Hr   Urinalysis   Result Value Ref Range    Specimen UA Urine, Clean Catch     Color, UA Yellow Yellow, Straw, Mignon    Appearance, UA Clear Clear    pH, UA 6.0 5.0 - 8.0    Specific Gravity, UA 1.010 1.005 - 1.030    Protein, UA Negative Negative    Glucose, UA Negative Negative    Ketones, UA Negative Negative    Bilirubin (UA) Negative Negative    Occult Blood UA Negative Negative    Nitrite, UA Negative Negative    Leukocytes, UA Negative Negative   Vitamin D   Result Value Ref Range    Vit D, 25-Hydroxy 35 30 - 96 ng/mL       IMPRESSIONS:   1.  Sarcoid by history with possible skin and lung involvement.  Presently these   do not appear to be active, and I will certainly agree with doctors that she   does not have active sarcoid at this time.  Note she has come off of prednisone  2.  Osteoarthritis, particularly involving her knees and spine.   3.  Osteoporosis on Prolia with prior fractures-ongoing and do today.  4.  Chronic steroid use by history but presently off steroids  5.  Chronic anticoagulant use.    PLAN:   1.  Continue off prednisone  2.  Continue Prolia, due today and again in 6 months  BMP reviewed and no contraindication to injection  3.  Keep Plaquenil 1 daily-ophthalmology checks yearly.  4.  Continue vitamin D-review D level with her which is normal now.  5.  Continue followup with Pain Management doctors, cardiologist, pulmonologist , and new family doctor  6.  Will see back here in 6 months for Prolia and routine lab            /ls [id number]  kashmir(s)            SL/HN  dd: 04/23/2018 22:02:24 (CDT)  td: 04/24/2018 02:02:03 (CDT)  Doc ID   #[id number]  Job ID #[id number]    CC:     Prolonged visit: Over35 min spent in patient care and evaluation. Over  20 mintime used in reviewing recent labs and symptoms, discussing all diagnostic possibilities especially of the skin t, counseling on medication options, explaining complicationsof therapies vs disease, and coordination of care with  Family  /Pulm/  Cardiology. Patient's questions were all addressed and she understands  And agrees with our plans and risks.  We will continue to give her Prolia injections here.

## 2018-10-08 NOTE — TELEPHONE ENCOUNTER
Call to schedule Can we find out what derm she did the skin biopsy with and get a copy of the pathology to put on file  Also tell her to stay off the doxy and add a probiotic daily that will help keep the yeast controlled    Follow up with dr knott as scheduled in august  If she has a copy of the biopsy reports bring it with her at that visit

## 2019-02-18 ENCOUNTER — TELEPHONE (OUTPATIENT)
Dept: RHEUMATOLOGY | Facility: CLINIC | Age: 69
End: 2019-02-18

## 2019-02-18 NOTE — TELEPHONE ENCOUNTER
----- Message from Best Burkett MA sent at 2/18/2019  3:37 PM CST -----  Contact: Pt  Pt called and would like to schedule a appt regarding  Prolia/bc.        Pt can be reached at [phone].      Thanks

## 2019-02-19 ENCOUNTER — TELEPHONE (OUTPATIENT)
Dept: RHEUMATOLOGY | Facility: CLINIC | Age: 69
End: 2019-02-19

## 2019-02-19 NOTE — TELEPHONE ENCOUNTER
----- Message from Hernan Ivy RN sent at 2/19/2019 11:51 AM CST -----  Regarding: TP  Ben Lane,  Would you be a doll and do me a solid by putting in a Prolia TP for this patient?!? (prayerhandemoji) (prayerhandemoji) (pleaseemoji) (pleaseemoji) (emodemetriusakiss) (prayerhandemoji)    With all my heart,  Hernan

## 2019-03-05 ENCOUNTER — INFUSION (OUTPATIENT)
Dept: RHEUMATOLOGY | Facility: HOSPITAL | Age: 69
End: 2019-03-05
Attending: INTERNAL MEDICINE
Payer: MEDICARE

## 2019-03-05 ENCOUNTER — OFFICE VISIT (OUTPATIENT)
Dept: RHEUMATOLOGY | Facility: CLINIC | Age: 69
End: 2019-03-05
Payer: MEDICARE

## 2019-03-05 ENCOUNTER — LAB VISIT (OUTPATIENT)
Dept: LAB | Facility: HOSPITAL | Age: 69
End: 2019-03-05
Attending: INTERNAL MEDICINE
Payer: MEDICARE

## 2019-03-05 VITALS
WEIGHT: 219.81 LBS | DIASTOLIC BLOOD PRESSURE: 70 MMHG | SYSTOLIC BLOOD PRESSURE: 141 MMHG | BODY MASS INDEX: 40.45 KG/M2 | HEIGHT: 62 IN | HEART RATE: 63 BPM

## 2019-03-05 VITALS — WEIGHT: 219.81 LBS | BODY MASS INDEX: 40.2 KG/M2

## 2019-03-05 DIAGNOSIS — G89.29 CHRONIC BILATERAL LOW BACK PAIN WITHOUT SCIATICA: ICD-10-CM

## 2019-03-05 DIAGNOSIS — Z79.01 LONG TERM CURRENT USE OF ANTICOAGULANT: Chronic | ICD-10-CM

## 2019-03-05 DIAGNOSIS — M17.0 PRIMARY OSTEOARTHRITIS OF BOTH KNEES: ICD-10-CM

## 2019-03-05 DIAGNOSIS — M47.816 SPONDYLOSIS OF LUMBAR REGION WITHOUT MYELOPATHY OR RADICULOPATHY: Chronic | ICD-10-CM

## 2019-03-05 DIAGNOSIS — M81.0 OSTEOPOROSIS, POSTMENOPAUSAL: Chronic | ICD-10-CM

## 2019-03-05 DIAGNOSIS — M47.812 SPONDYLOSIS OF CERVICAL REGION WITHOUT MYELOPATHY OR RADICULOPATHY: Chronic | ICD-10-CM

## 2019-03-05 DIAGNOSIS — E55.9 VITAMIN D DEFICIENCY: ICD-10-CM

## 2019-03-05 DIAGNOSIS — S22.000S COMPRESSION FX, THORACIC SPINE, SEQUELA: ICD-10-CM

## 2019-03-05 DIAGNOSIS — Z79.899 HIGH RISK MEDICATION USE: ICD-10-CM

## 2019-03-05 DIAGNOSIS — M54.50 CHRONIC BILATERAL LOW BACK PAIN WITHOUT SCIATICA: ICD-10-CM

## 2019-03-05 DIAGNOSIS — M81.0 OSTEOPOROSIS, POSTMENOPAUSAL: Primary | ICD-10-CM

## 2019-03-05 DIAGNOSIS — D86.9 SARCOID: Primary | Chronic | ICD-10-CM

## 2019-03-05 LAB
ANION GAP SERPL CALC-SCNC: 9 MMOL/L
BUN SERPL-MCNC: 24 MG/DL
CALCIUM SERPL-MCNC: 10.4 MG/DL
CHLORIDE SERPL-SCNC: 103 MMOL/L
CO2 SERPL-SCNC: 29 MMOL/L
CREAT SERPL-MCNC: 0.9 MG/DL
EST. GFR  (AFRICAN AMERICAN): >60 ML/MIN/1.73 M^2
EST. GFR  (NON AFRICAN AMERICAN): >60 ML/MIN/1.73 M^2
GLUCOSE SERPL-MCNC: 90 MG/DL
POTASSIUM SERPL-SCNC: 4.4 MMOL/L
SODIUM SERPL-SCNC: 141 MMOL/L

## 2019-03-05 PROCEDURE — 36415 COLL VENOUS BLD VENIPUNCTURE: CPT

## 2019-03-05 PROCEDURE — 80048 BASIC METABOLIC PNL TOTAL CA: CPT

## 2019-03-05 PROCEDURE — 63600175 PHARM REV CODE 636 W HCPCS: Mod: JG | Performed by: PHYSICIAN ASSISTANT

## 2019-03-05 PROCEDURE — 99999 PR PBB SHADOW E&M-EST. PATIENT-LVL III: CPT | Mod: PBBFAC,,, | Performed by: INTERNAL MEDICINE

## 2019-03-05 PROCEDURE — 1101F PR PT FALLS ASSESS DOC 0-1 FALLS W/OUT INJ PAST YR: ICD-10-PCS | Mod: CPTII,S$GLB,, | Performed by: INTERNAL MEDICINE

## 2019-03-05 PROCEDURE — 3078F DIAST BP <80 MM HG: CPT | Mod: CPTII,S$GLB,, | Performed by: INTERNAL MEDICINE

## 2019-03-05 PROCEDURE — 99214 OFFICE O/P EST MOD 30 MIN: CPT | Mod: S$GLB,,, | Performed by: INTERNAL MEDICINE

## 2019-03-05 PROCEDURE — 1101F PT FALLS ASSESS-DOCD LE1/YR: CPT | Mod: CPTII,S$GLB,, | Performed by: INTERNAL MEDICINE

## 2019-03-05 PROCEDURE — 3077F PR MOST RECENT SYSTOLIC BLOOD PRESSURE >= 140 MM HG: ICD-10-PCS | Mod: CPTII,S$GLB,, | Performed by: INTERNAL MEDICINE

## 2019-03-05 PROCEDURE — 99999 PR PBB SHADOW E&M-EST. PATIENT-LVL III: ICD-10-PCS | Mod: PBBFAC,,, | Performed by: INTERNAL MEDICINE

## 2019-03-05 PROCEDURE — 99214 PR OFFICE/OUTPT VISIT, EST, LEVL IV, 30-39 MIN: ICD-10-PCS | Mod: S$GLB,,, | Performed by: INTERNAL MEDICINE

## 2019-03-05 PROCEDURE — 3078F PR MOST RECENT DIASTOLIC BLOOD PRESSURE < 80 MM HG: ICD-10-PCS | Mod: CPTII,S$GLB,, | Performed by: INTERNAL MEDICINE

## 2019-03-05 PROCEDURE — 96372 THER/PROPH/DIAG INJ SC/IM: CPT

## 2019-03-05 PROCEDURE — 3077F SYST BP >= 140 MM HG: CPT | Mod: CPTII,S$GLB,, | Performed by: INTERNAL MEDICINE

## 2019-03-05 RX ORDER — ESOMEPRAZOLE MAGNESIUM 40 MG/1
1 CAPSULE, DELAYED RELEASE ORAL
COMMUNITY
Start: 2015-06-30 | End: 2021-03-15

## 2019-03-05 RX ORDER — DIPHENOXYLATE HYDROCHLORIDE AND ATROPINE SULFATE 2.5; .025 MG/1; MG/1
TABLET ORAL
COMMUNITY
End: 2021-02-09

## 2019-03-05 RX ORDER — ESTRADIOL 0.1 MG/G
CREAM VAGINAL
Refills: 4 | COMMUNITY
Start: 2019-02-27 | End: 2019-09-10

## 2019-03-05 RX ORDER — METOPROLOL TARTRATE 50 MG/1
75 TABLET ORAL 2 TIMES DAILY
COMMUNITY

## 2019-03-05 RX ORDER — DIPHENHYDRAMINE HCL 25 MG
25 CAPSULE ORAL EVERY 6 HOURS PRN
COMMUNITY
End: 2022-08-25

## 2019-03-05 RX ORDER — FLUNISOLIDE 0.25 MG/ML
2 SOLUTION NASAL
COMMUNITY
End: 2019-03-05

## 2019-03-05 RX ORDER — TRAMADOL HYDROCHLORIDE 50 MG/1
TABLET ORAL
COMMUNITY
End: 2022-09-09

## 2019-03-05 RX ORDER — MINOCYCLINE HYDROCHLORIDE 100 MG/1
CAPSULE ORAL
COMMUNITY
End: 2019-09-10

## 2019-03-05 RX ORDER — ROPINIROLE 5 MG/1
5 TABLET, FILM COATED ORAL NIGHTLY
Refills: 5 | COMMUNITY
Start: 2019-02-13 | End: 2022-02-07 | Stop reason: SDUPTHER

## 2019-03-05 RX ORDER — FLUTICASONE PROPIONATE 50 MCG
SPRAY, SUSPENSION (ML) NASAL
COMMUNITY
End: 2019-09-10

## 2019-03-05 RX ORDER — DIAZEPAM 10 MG/1
10 TABLET ORAL NIGHTLY
COMMUNITY
End: 2022-09-09

## 2019-03-05 RX ORDER — AZELASTINE 1 MG/ML
SPRAY, METERED NASAL
Refills: 5 | COMMUNITY
Start: 2019-03-01 | End: 2020-03-12

## 2019-03-05 RX ORDER — DULOXETIN HYDROCHLORIDE 30 MG/1
30 CAPSULE, DELAYED RELEASE ORAL
COMMUNITY
End: 2019-03-05

## 2019-03-05 RX ORDER — WARFARIN 2.5 MG/1
2.5 TABLET ORAL
COMMUNITY
End: 2019-03-11

## 2019-03-05 RX ORDER — GUAIFENESIN 600 MG/1
1200 TABLET, EXTENDED RELEASE ORAL 2 TIMES DAILY PRN
COMMUNITY

## 2019-03-05 RX ORDER — FUROSEMIDE 20 MG/1
40 TABLET ORAL EVERY OTHER DAY
Refills: 3 | COMMUNITY
Start: 2018-12-14

## 2019-03-05 RX ADMIN — DENOSUMAB 60 MG: 60 INJECTION SUBCUTANEOUS at 11:03

## 2019-03-05 NOTE — NURSING
Prolia 60 mg q 6 months  Last dose given-8/27/18    Any invasive dental procedures in past 3 months or upcoming 3 months: denies    Last Rheumatology provider visit- Seen by Dr. Balderrama on 3/5/19    Recent labs? 3/5/19;  CKD pt needing repeat labs in 10 days- No   Lab Results   Component Value Date    CALCIUM 10.4 03/05/2019     Lab Results   Component Value Date    CREATININE 0.9 03/05/2019     Lab Results   Component Value Date    ESTGFRAFRICA >60 03/05/2019     Lab Results   Component Value Date    EGFRNONAA >60 03/05/2019     Lab Results   Component Value Date    SDZCAGDN53QT 35 08/13/2018          Lot #- [id number]  Expiration Date- 06/21      Prolia 60 mg/ml administered SQ to Left lower abdominal quadrant. Tolerated without any complaints. No adverse reaction noted or reported after 15 minutes of administration. No redness, swelling, or drainage noted to site. Pt instructed on signs and symptoms of reaction to report. Verbalizes understanding.

## 2019-03-05 NOTE — PROGRESS NOTES
RHEUMATOLOGY FOLLOWUP    CHIEF COMPLAINT:  Sarcoid and osteoporosis    PERTINENT HISTORY:  Mrs. Avalos has been followed in Rheumatology for a number of   years for sarcoidosis and osteoporosis.  She is due for Prolia today.  She denies any prior side effects from Prolia and has had no recent falls or fractures.  She has fractured her T11 vertebra years ago.  Laboratory studies were done today see below  She has tapered off of prednisone as well as off Plaquenil for her sarcoidosis.  She still has occasional lumps in her skin /subcutaneous area but they are not painful and are not progressive -come and go.  She has had no increasing lung involvement.  Recent pulmonary exam/evaluation-they werenot clear that she still had active sarcoid.  Her eyes been doing okay with no flares of uveitis.  She has enlarged liver but not felt to be from sarcoid-likely fatty liver.  .  Several  physicians had told her they do not believe she   actually has sarcoid-  She actually feels pretty good about this.  There has been no flares of systemic features such as weight loss, fevers, chills, muscle weakness, new rashes, since she tapered off steroids or Plaquenil.    Since last visit she has had surgery on her lower back.  She had L4-5 fusion in late October.  Unfortunately she had complications including and allergic reaction to opioids.  She also had bladder retention and UTI.  She had good rehab for several weeks.  She still has pain in the low back 3 to 4/10 but it is slowly  getting better.  She has known degenerative arthritis in both the cervical and lumbar area.  She has occasional soreness in her neck and asked me to feel that today as well    . She presently has ongoing knee problems.  She has had a left knee   replacement in the past and ortho is seeing her right knee now and they want to   do surgery there as well.  In view of the complications following her back surgery she wants to put knee replacement on hold   She also  has a right rotator cuff tear and going to   therapy.  She is using a cane a good bit more because of her knee.     She is due for a DEXA in August of 2019-will schedule    REVIEW OF SYSTEMS:  GENERAL:  Her weight is actually stable.  She is not having in general no fever,   chills or sweats.    HEENT:  With no active symptoms or eye inflammation, parotids or submandibular   swelling.  Mild symptoms of dryness.   CARDIAC:  See HPI.  She is having hypertension and arrhythmias.  A question   whether the latter could be from sarcoid in the past.  She is on meds.   PULMONARY:  She has pulmonary scar tissue, which is stable.  Not clear at   present whether she has any active sarcoid.  No progressive cough shortness of breath.  HEMATOLOGIC:  She   is on chronic Eliquis, does occasionally   bruise.  GASTROINTESTINAL:  No recent nausea or vomiting.  BONES:  Osteoporosis on Prolia as mentioned for her fracture.  BACK:  Positive for chronic back pain and sciatica, sees pain management.  Recent back surgery-see HPI  VASCULAR:  Negative for any recent claudication or Raynauds.    MUSCULAR:  No progressive weakness, no myopathy from sarcoid.  SKIN:  She has multiple nodules into her skin.  They have been told possibly   sarcoid in the past, but recent biopsy was negative.  See her last note.    Past Medical History:   Diagnosis Date    AC (acromioclavicular) joint bone spurs     Acid reflux 4/14/2011    Adrenal insufficiency     Bilateral lower extremity edema     Depression     Encounter for blood transfusion 1978    Factor V Leiden     History of supraventricular tachycardia     Hypertension     IBS (irritable bowel syndrome)     Long term current use of anticoagulant     Long term current use of systemic steroids     Meralgia paresthetica of left side     Osteoarthritis     Osteoporosis     Osteoporosis, post-menopausal     Restless leg syndrome     S/P ablation operation for arrhythmia     Sarcoidosis      Sleep apnea     cpap     Past Surgical History:   Procedure Laterality Date    ablation      tachycardia    APPENDECTOMY      ARTHROPLASTY-KNEE Left 12/13/2016    [facility]    BACK SURGERY      CARDIAC ELECTROPHYSIOLOGY STUDY AND ABLATION      CARPAL TUNNEL RELEASE      cataracts      CHOLECYSTECTOMY      COLECTOMY      COLON SURGERY      ECTOPIC PREGNANCY SURGERY      evacuation of ectopic pregnancy      EYE SURGERY      cataracts with iol    EYE SURGERY      FRACTURE SURGERY      Total knee replacement     HERNIA REPAIR      JOINT REPLACEMENT      LAMINECTOMY, SPINE, LUMBAR, WITH DISCECTOMY Right 3/6/2013    Performed by [facility]a Jr[facility] at [facility] OR    left knee replacemant      LEG SURGERY      lazer to leg    LEG SURGERY      right knee replacement      sinus cleaning      SPINE SURGERY      TONSILLECTOMY      TUBAL LIGATION      uterine suspension      VEIN LIGATION AND STRIPPING       Family History   Problem Relation Age of Onset    Cancer Mother     Breast cancer Mother 65    Heart disease Father     Stroke Father     Hypertension Father     Hypertension Sister     Diabetes Daughter      Social History     Socioeconomic History    Marital status:      Spouse name: Not on file    Number of children: Not on file    Years of education: Not on file    Highest education level: Not on file   Social Needs    Financial resource strain: Not on file    Food insecurity - worry: Not on file    Food insecurity - inability: Not on file    Transportation needs - medical: Not on file    Transportation needs - non-medical: Not on file   Occupational History    Not on file   Tobacco Use    Smoking status: Never Smoker    Smokeless tobacco: Never Used   Substance and Sexual Activity    Alcohol use: No    Drug use: No    Sexual activity: Not on file   Other Topics Concern    Not on file   Social History Narrative    ** Merged History  "Encounter **          Review of patient's allergies indicates:   Allergen Reactions    Neurontin [gabapentin] Edema    Penicillins Hives    Ceftin [cefuroxime axetil] Nausea And Vomiting    Dilaudid [hydromorphone]     Latex, natural rubber Hives    Neurontin [gabapentin] Swelling    Ceftin [cefuroxime axetil] Rash    Penicillins Rash         PHYSICAL EXAMINATION:  VITAL SIGNS:  BP (!) 141/70   Pulse 63   Ht 5' 2" (1.575 m)   Wt 99.7 kg (219 lb 12.8 oz)   BMI 40.20 kg/m²      HEENT:  With no active redness or tenderness.  No swelling or tenderness in the   parotids.  Notes nodes in her neck.  Normocephalic, atraumatic, alert and   oriented X3.  PERRLA.  Conjunctiva clear, sclera  non icteric.  No tonsillar   enlargement.  No pharyngeal erythema or exudate.  No ulcers or lesions noted.    Mucous membranes moist and pink.  Oral pharynx clear.  Neck supple, no JVD.    Normal ROM.  Thyroid normal.  No masses or tracheal deviation.  No cervical,   axillary or inguinal lymph node enlargement.  CHEST:  Lungs clear to auscultation and percussion.  CARDIOVASCULAR:  Normal rhythm.  Normal S1, S2.  No gallops today.  No murmurs.   ABDOMEN:  Mildly obese, but not tender. No organomegaly.  No tenderness over the liver  MUSCULOSKELETAL:  Muscle strength 5/5 in upper and lower extremities.  Normal joint   exam peripherally with no heat, redness or swelling  of her   hands.  No sarcoid dactylitis.  Knees with OA, the left has been replaced.    Right is tender.  No effusion.  Mild crepitus but good range of motion   Pulses are normal.  Venous insufficiency noted in both lower legs with edema and stasis dermatitis.  Note she has had some vein work done recently as well  SKIN:  With multiple small lipomatous nodules noted in the arms and legs.  No   overlying erythema or tenderness.     Results for orders placed or performed in visit on 03/05/19   Basic metabolic panel   Result Value Ref Range    Sodium 141 136 - 145 " mmol/L    Potassium 4.4 3.5 - 5.1 mmol/L    Chloride 103 95 - 110 mmol/L    CO2 29 23 - 29 mmol/L    Glucose 90 70 - 110 mg/dL    BUN, Bld 24 (H) 8 - 23 mg/dL    Creatinine 0.9 0.5 - 1.4 mg/dL    Calcium 10.4 8.7 - 10.5 mg/dL    Anion Gap 9 8 - 16 mmol/L    eGFR if African American >60 >60 mL/min/1.73 m^2    eGFR if non African American >60 >60 mL/min/1.73 m^2       IMPRESSIONS:   1.  Sarcoid by history with possible skin and lung involvement.  No signs of active involvement today and off steroids and Plaquenil  2.  Osteoarthritis, particularly involving her knees and spine.  Worst area likely the right knee, followed by her chronic spine pain   3.  Osteoporosis on Prolia with prior fractures-ongoing and due today.  No contraindications to use-see labs  4.  Chronic steroid use by history but presently off steroids  5.  Chronic anticoagulant use-Eliquis  6.  Chronic right shoulder pain with rotator cuff damage-putting off surgery.    PLAN:   1.  Continue off prednisone; continue off Plaquenil  2.  Continue Prolia, due today and again in 6 months  BMP reviewed and no contraindication to injection-next visit can just get her shot and have the lab reviewed by our nurses and only see the provider once a year.  Note she has to come from [place]  3.  Add turmeric 1000 mg daily as a supplement to see if it helps her joint pains.  4.  Continue vitamin D-review D level with her which is normal now.  5.  Continue followup with Pain Management doctors, cardiologist, pulmonologist , and new family doctor  6.  Will see back here in 6 months for Prolia and routine lab  7.  Encouraged exercises on a daily basis to help her back pains and build bone  8.  Obtain DEXA when she comes for her next Prolia    Prolonged visit: Over35 min spent in patient care and evaluation. Over 20 min of time used in reviewing recent labs, prior DEXA, prior chest x-rays and present symptoms symptoms, discussing diagnostic possibilities, including the  question of sarcoid activity,, counseling on medication and exercise options, reviewing side effects of Prolia therapy, and coordination of care with  Family    Patient's questions were all addressed and she understands our plans and risks.            / [id number] blank(s)            SL/HN  dd: 04/23/2018 22:02:24 (CDT)  td: 04/24/2018 02:02:03 (CDT)  Doc ID   #[id number]  Job ID #[id number]    CC:     Prolonged visit: Over35 min spent in patient care and evaluation. Over  20 mintime used in reviewing recent labs and symptoms, discussing all diagnostic possibilities especially of the skin t, counseling on medication options, explaining complicationsof therapies vs disease, and coordination of care with  Family  /Pulm/  Cardiology. Patient's questions were all addressed and she understands  And agrees with our plans and risks.  We will continue to give her Prolia injections here.

## 2019-03-05 NOTE — PATIENT INSTRUCTIONS
OK to continue Prolia every 6 mon- nxt visit just get shot    Continue vit d    Exercises daily    Work with  Pain management on back and neck    Can try Tumeric supplements 1000 mg day for joint pains       full weight-bearing

## 2019-03-11 ENCOUNTER — TELEPHONE (OUTPATIENT)
Dept: RHEUMATOLOGY | Facility: CLINIC | Age: 69
End: 2019-03-11

## 2019-03-11 NOTE — TELEPHONE ENCOUNTER
----- Message from Sara Dickey sent at 3/11/2019 11:07 AM CDT -----  Contact: pt  Type:  Patient Returning Call    Who Called: the pt  Who Left Message for Patient: unknown  Does the patient know what this is regarding?: no  Would the patient rather a call back or a response via [facility]? Call back  Best Call Back Number: [phone]  Additional Information: n/a

## 2019-03-11 NOTE — TELEPHONE ENCOUNTER
----- Message from Sara Dickey sent at 3/11/2019 10:13 AM CDT -----  Contact: pt  The pt request a call concerning her med list, the pt request a call at [phone]///[id number]

## 2019-03-20 ENCOUNTER — TELEPHONE (OUTPATIENT)
Dept: NEUROLOGY | Facility: CLINIC | Age: 69
End: 2019-03-20

## 2019-03-20 NOTE — TELEPHONE ENCOUNTER
----- Message from Emily Roberts sent at 3/20/2019  3:48 PM CDT -----  Contact: pt  Type: Needs Medical Advice    Who Called:  Pt  Best Call Back Number: [phone]  Additional Information: pt has a referral to see Dr valdes the June appt said needs to be seen asap would like a call back please

## 2019-03-21 ENCOUNTER — TELEPHONE (OUTPATIENT)
Dept: NEUROLOGY | Facility: CLINIC | Age: 69
End: 2019-03-21

## 2019-03-21 NOTE — TELEPHONE ENCOUNTER
----- Message from Ana Hudson sent at 3/21/2019  1:21 PM CDT -----  Contact: pt  ..Type:  Sooner Apoointment Request    Caller is requesting a sooner appointment.  Caller declined first available appointment listed below.  Caller will not accept being placed on the waitlist and is requesting a message be sent to doctor.    Name of Caller: pt  When is the first available appointment?  6-20-19  Reason:  Referral in chart to see DENYS CALIXTO  Best Call Back Number:   Additional Information:  Pt would like to schedule a NP appt to be seen before 6-20-19 if available  Please call pt to advise  Thanks

## 2019-04-04 ENCOUNTER — OFFICE VISIT (OUTPATIENT)
Dept: NEUROLOGY | Facility: CLINIC | Age: 69
End: 2019-04-04
Payer: MEDICARE

## 2019-04-04 VITALS
WEIGHT: 224 LBS | RESPIRATION RATE: 20 BRPM | SYSTOLIC BLOOD PRESSURE: 111 MMHG | HEART RATE: 62 BPM | DIASTOLIC BLOOD PRESSURE: 60 MMHG | HEIGHT: 62 IN | BODY MASS INDEX: 41.22 KG/M2

## 2019-04-04 DIAGNOSIS — G31.84 MILD COGNITIVE IMPAIRMENT, SO STATED: ICD-10-CM

## 2019-04-04 DIAGNOSIS — M47.816 SPONDYLOSIS OF LUMBAR REGION WITHOUT MYELOPATHY OR RADICULOPATHY: Chronic | ICD-10-CM

## 2019-04-04 DIAGNOSIS — M47.812 SPONDYLOSIS OF CERVICAL REGION WITHOUT MYELOPATHY OR RADICULOPATHY: Chronic | ICD-10-CM

## 2019-04-04 DIAGNOSIS — E55.9 VITAMIN D DEFICIENCY: ICD-10-CM

## 2019-04-04 DIAGNOSIS — R20.0 BILATERAL HAND NUMBNESS: Primary | ICD-10-CM

## 2019-04-04 PROCEDURE — 3078F PR MOST RECENT DIASTOLIC BLOOD PRESSURE < 80 MM HG: ICD-10-PCS | Mod: CPTII,S$GLB,, | Performed by: PSYCHIATRY & NEUROLOGY

## 2019-04-04 PROCEDURE — 1101F PT FALLS ASSESS-DOCD LE1/YR: CPT | Mod: CPTII,S$GLB,, | Performed by: PSYCHIATRY & NEUROLOGY

## 2019-04-04 PROCEDURE — 3074F SYST BP LT 130 MM HG: CPT | Mod: CPTII,S$GLB,, | Performed by: PSYCHIATRY & NEUROLOGY

## 2019-04-04 PROCEDURE — 99205 OFFICE O/P NEW HI 60 MIN: CPT | Mod: S$GLB,,, | Performed by: PSYCHIATRY & NEUROLOGY

## 2019-04-04 PROCEDURE — 99205 PR OFFICE/OUTPT VISIT, NEW, LEVL V, 60-74 MIN: ICD-10-PCS | Mod: S$GLB,,, | Performed by: PSYCHIATRY & NEUROLOGY

## 2019-04-04 PROCEDURE — 3078F DIAST BP <80 MM HG: CPT | Mod: CPTII,S$GLB,, | Performed by: PSYCHIATRY & NEUROLOGY

## 2019-04-04 PROCEDURE — 99999 PR PBB SHADOW E&M-EST. PATIENT-LVL IV: ICD-10-PCS | Mod: PBBFAC,,, | Performed by: PSYCHIATRY & NEUROLOGY

## 2019-04-04 PROCEDURE — 99999 PR PBB SHADOW E&M-EST. PATIENT-LVL IV: CPT | Mod: PBBFAC,,, | Performed by: PSYCHIATRY & NEUROLOGY

## 2019-04-04 PROCEDURE — 1101F PR PT FALLS ASSESS DOC 0-1 FALLS W/OUT INJ PAST YR: ICD-10-PCS | Mod: CPTII,S$GLB,, | Performed by: PSYCHIATRY & NEUROLOGY

## 2019-04-04 PROCEDURE — 3074F PR MOST RECENT SYSTOLIC BLOOD PRESSURE < 130 MM HG: ICD-10-PCS | Mod: CPTII,S$GLB,, | Performed by: PSYCHIATRY & NEUROLOGY

## 2019-04-04 NOTE — PROGRESS NOTES
Date: 4/4/2019    Patient ID: Cori Avalos is a 69 y.o. female.    Referring Provider:  Arnoldo Miller*    Chief Complaint: Memory Loss and balance problems      History of Present Illness:  Ms. Avalos is a 69 y.o. female with sarcoidosis (pulmonary and skin) who presents referred by Arnoldo Miller* today for evaluation of memory loss and imbalance. The patient was accompanied by her daughter and  who also contributed to the following history.     She has had multiple falls in the past year. Her right leg is weak and she was falling, felt like due to L4/5. She had surgery in Oct 2018 and did not do well post operatively. She was on opioids and was delirious afterwards. She then had a UTI and this triggered delirium as well. She was at [facility] for 2 weeks after. She also was deilrious after knee surgery in 2016.     She had a severe fall with a concussion. She last fell on Sunday and mary tot the ER.     Her daughter notes that she has a hard time following conversations. She has trouble finding words. It is more short term things. No confusion with how to cook things, navigate, etc. Her  notes that the things are similar to aging. She has gotten three of her granddaughters confused. She has called the dog by the wrong name. She doesn't feel as rational as she used to be. She feels she has lost some intelligence. She was a teacher (calculus) and type A personality.     She denies any vertigo or imbalance in her head. She is very impulsive as well. She rushes and doesn't take her time.     Gabapentin gives her edema.      The memory has been declining for >1 year. She has short term memory trouble and difficulty concentrating. This was worse after her surgery but has not seemed to return to her baseline before surgery.     Her maternal grandmother had Alzheimer's dementia.     She has a history of sarcoidosis with pulmonary and skin involvement. She was previously on steroids and  plaquenil but is off. She was on methotrexate. Her daughter noticed a personality change on that and she is off as they were told that she doesn't actually have sarcoidosis.     MRI brain in July 2018 without contrast showed some white matter hyperintensities.     She takes valium nightly for muscle relaxation. She is on amitriptyline and Cymbalta. She was on zanaflex but discontinued.     Review of Systems:   14 systems reviewed - All other systems were reviewed and negative except as mentioned in the HPI    Allergies:  Review of patient's allergies indicates:   Allergen Reactions    Gabapentin Edema and Other (See Comments)    Penicillins Hives    Pregabalin Other (See Comments)    Ceftin [cefuroxime axetil] Nausea And Vomiting    Dilaudid [hydromorphone]     Latex, natural rubber Hives    Neurontin [gabapentin] Swelling    Opioids - morphine analogues     Ceftin [cefuroxime axetil] Rash    Penicillins Rash       Current Medications:  Current Outpatient Medications   Medication Sig Dispense Refill    amitriptyline (ELAVIL) 10 MG tablet Take 10 mg by mouth every evening.       apixaban (ELIQUIS) 5 mg Tab Take 2.5 mg by mouth 2 (two) times daily.      azelastine (ASTELIN) 137 mcg (0.1 %) nasal spray   5    cholecalciferol, vitamin D3, (VITAMIN D3) 1,000 unit capsule Take 1,000 Units by mouth once daily.      denosumab (PROLIA) 60 mg/mL Syrg Inject into the skin.      dextromethorphan-guaifenesin  mg (MUCINEX DM)  mg per 12 hr tablet Take 1 tablet by mouth 2 (two) times daily as needed.      diazePAM (VALIUM) 10 MG Tab Valium 10 mg tablet   Take 1 tablet 3 times a day by oral route.      diphenhydrAMINE (BENADRYL) 25 mg capsule Take 25 mg by mouth every 6 (six) hours as needed.      diphenoxylate-atropine 2.5-0.025 mg (LOMOTIL) 2.5-0.025 mg per tablet Lomotil 2.5 mg-0.025 mg tablet   Take 2 tablets 4 times a day by oral route as needed for 30 days.      duloxetine (CYMBALTA) 60 MG  capsule Take 60 mg by mouth once daily.      esomeprazole (NEXIUM) 40 MG capsule Take 1 capsule by mouth.      estradiol (ESTRACE) 0.01 % (0.1 mg/gram) vaginal cream   4    fluticasone (FLONASE) 50 mcg/actuation nasal spray fluticasone propionate 50 mcg/actuation nasal spray,suspension      furosemide (LASIX) 40 MG tablet   3    guaiFENesin (MUCINEX) 600 mg 12 hr tablet Take 1,200 mg by mouth.      metoprolol tartrate (LOPRESSOR) 100 MG tablet Take 100 mg by mouth.      minocycline (MINOCIN,DYNACIN) 100 MG capsule minocycline 100 mg capsule      montelukast (SINGULAIR) 10 mg tablet Take 10 mg by mouth every evening.      multivitamin capsule Take 1 capsule by mouth.      ondansetron (ZOFRAN-ODT) 4 MG TbDL Take 1 tablet (4 mg total) by mouth every 8 (eight) hours as needed. 30 tablet 0    potassium chloride (KLOR-CON) 10 MEQ TbSR Take 10 mEq by mouth once daily.       rOPINIRole (REQUIP) 5 MG tablet   5    traMADol (ULTRAM) 50 mg tablet tramadol 50 mg tablet   Take 1 tablet every 6 hours by oral route.       No current facility-administered medications for this visit.        Past Medical History:  Past Medical History:   Diagnosis Date    AC (acromioclavicular) joint bone spurs     Acid reflux 4/14/2011    Adrenal insufficiency     Bilateral lower extremity edema     Depression     Encounter for blood transfusion 1978    Factor V Leiden     History of supraventricular tachycardia     Hypertension     IBS (irritable bowel syndrome)     Long term current use of anticoagulant     Long term current use of systemic steroids     Meralgia paresthetica of left side     Osteoarthritis     Osteoporosis     Osteoporosis, post-menopausal     Restless leg syndrome     S/P ablation operation for arrhythmia     Sarcoidosis     Sleep apnea     cpap       Past Surgical History:  Past Surgical History:   Procedure Laterality Date    ablation      tachycardia    APPENDECTOMY      ARTHROPLASTY-KNEE  "Left 12/13/2016    [facility] OR    BACK SURGERY      CARDIAC ELECTROPHYSIOLOGY STUDY AND ABLATION      CARPAL TUNNEL RELEASE      cataracts      CHOLECYSTECTOMY      COLECTOMY      COLON SURGERY      ECTOPIC PREGNANCY SURGERY      evacuation of ectopic pregnancy      EYE SURGERY      cataracts with iol    EYE SURGERY      FRACTURE SURGERY      Total knee replacement     HERNIA REPAIR      JOINT REPLACEMENT      LAMINECTOMY, SPINE, LUMBAR, WITH DISCECTOMY Right 3/6/2013    [facility] OR    left knee replacemant      LEG SURGERY      lazer to leg    LEG SURGERY      right knee replacement      sinus cleaning      SPINE SURGERY  2013 and 2018    TONSILLECTOMY      TUBAL LIGATION      uterine suspension      VEIN LIGATION AND STRIPPING         Family History:  family history includes Alzheimer's disease in her maternal grandmother; Breast cancer (age of onset: 65) in her mother; Cancer in her mother; Diabetes in her daughter; Heart disease in her father; Hypertension in her father and sister; Stroke in her father.    Social History:   reports that she has never smoked. She has never used smokeless tobacco. She reports that she does not drink alcohol or use drugs.    Physical Exam:  Vitals:    04/04/19 1055   BP: 111/60   Pulse: 62   Resp: 20   Weight: 101.6 kg (224 lb)   Height: 5' 2" (1.575 m)   PainSc:   3   PainLoc: Back     Body mass index is 40.97 kg/m².  General: Well developed, well nourished.  No acute distress.  Eyes: no ocular hemorrhages  Musculoskeletal: No obvious joint deformities, moves all extremities well.  Peripheral vascular: bilateral LE edema    Neurological Exam:  Mental status: Awake, alert. MOCA 28/30.   Speech/Language: No dysarthria or aphasia on conversation.   Cranial nerves: Visual fields full. Pupils equal round and reactive to light, extraocular movements intact, facial strength and sensation intact " bilaterally, palate and tongue midline, hearing grossly intact bilaterally. Shoulder shrug normal bilaterally.   Motor: 5 out of 5 strength throughout the upper and left lower extremities. 4/5 strength throughout right LE. Normal bulk and tone.   Sensation: Intact to light touch, pinprick, and vibration bilaterally.  DTR: 2+ at the knees and biceps bilaterally.  Coordination: Finger-nose-finger testing and rapid alternating movements normal bilaterally. No tremor.   Gait: walks with cane            Data:  I have personally reviewed the referring provider's notes, labs, & imaging made available to me today.       Labs:  CBC:   Lab Results   Component Value Date    WBC 7.09 08/13/2018    HGB 13.5 08/13/2018    HCT 38.9 08/13/2018     08/13/2018    MCV 96 08/13/2018    RDW 12.7 08/13/2018     BMP:   Lab Results   Component Value Date     03/05/2019    K 4.4 03/05/2019     03/05/2019    CO2 29 03/05/2019    BUN 24 (H) 03/05/2019    CREATININE 0.9 03/05/2019    GLU 90 03/05/2019    CALCIUM 10.4 03/05/2019    MG 2.0 08/13/2011    PHOS 2.6 (L) 08/29/2011     LFTS;   Lab Results   Component Value Date    PROT 7.2 08/13/2018    ALBUMIN 3.9 08/13/2018    BILITOT 0.8 08/13/2018    AST 15 08/13/2018    ALKPHOS 91 08/13/2018    ALT 18 08/13/2018    GGT 48 03/15/2007     COAGS:   Lab Results   Component Value Date    INR 1.0 05/12/2018     FLP:   Lab Results   Component Value Date    CHOL 141 08/15/2017    HDL 56 08/15/2017    LDLCALC 63.4 08/15/2017    TRIG 108 08/15/2017    CHOLHDL 39.7 08/15/2017         Imaging:  I have personally reviewed the imaging, MRI brain from July 2018 shows global atrophy. CT head from a few days ago seems to show frontal predominant atrophy.     Assessment and Plan:  Ms. Avalos is a 69 y.o. female referred to me by Arnoldo iMller* for evaluation of memory loss and unsteadiness. I discussed that in regards to the memory, the MOCA score is reassuringly normal. It is possible  she has MCI or a very early developing cognitive decline. With the possible frontal predominant atrophy on CT head and the impulsivity and personality changes, FTD is a possibility. We will obtain updated MRI brain given the word finding difficulties that started after surgery in Oct 2018 to rule out stroke. If this is negative, we can consider PET and can consider neuropsych testing if MOCA score declines. We will see her back in 6 months with repeat MOCA. We will obtain TSH and B12 testing. She is on vitamin D and last level was normal.     In regards to her falls, she needs to be using a gait aid and be careful. I think her left leg weakness is contributing to her falls. A neurodegenerative disorder like PSP, lewy body, or MSA is possible too but she does not have the classic findings for that. Her CT head does not appear consistent with NPH.     For her hand numbness, this is either CTS (would be recurrent in the right since she has past surgery) or cervical radiculopathy. Will obtain EMG to differentiate.     Bilateral hand numbness  -     EMG W/ ULTRASOUND AND NERVE CONDUCTION TEST 2 Extremities; Future    Mild cognitive impairment, so stated  -     MRI Brain Without Contrast; Future; Expected date: 04/04/2019  -     TSH; Future; Expected date: 04/04/2019  -     Vitamin B12; Future; Expected date: 04/04/2019    Spondylosis of lumbar region without myelopathy or radiculopathy    Vitamin D deficiency

## 2019-04-04 NOTE — LETTER
April 4, 2019      Arnoldo Miller MD  [address] [facility] [address]           [facility]  [address] [facility] [address] [place] [address]  Phone: [phone]  Fax: [phone]          Patient: Cori Avalos   MR Number: [id number]   YOB: 1950   Date of Visit: 4/4/2019       Dear Dr. Arnoldo Miller:    Thank you for referring Cori Avalos to me for evaluation. Attached you will find relevant portions of my assessment and plan of care.    If you have questions, please do not hesitate to call me. I look forward to following Cori Avalos along with you.    Sincerely,    Halina Loving MD    Enclosure  CC:  No Recipients    If you would like to receive this communication electronically, please contact [email or web address] or [phone] to request more information on [employer] Link access.    For providers and/or their staff who would like to refer a patient to [facility], please contact us through our one-stop-shop provider referral line, [facility], at [phone].    If you feel you have received this communication in error or would no longer like to receive these types of communications, please e-mail [email or web address]

## 2019-04-29 ENCOUNTER — HOSPITAL ENCOUNTER (OUTPATIENT)
Dept: RADIOLOGY | Facility: HOSPITAL | Age: 69
Discharge: HOME | End: 2019-04-29
Attending: PSYCHIATRY & NEUROLOGY
Payer: MEDICARE

## 2019-04-29 DIAGNOSIS — G31.84 MILD COGNITIVE IMPAIRMENT, SO STATED: ICD-10-CM

## 2019-04-29 PROCEDURE — 70551 MRI BRAIN STEM W/O DYE: CPT | Mod: TC,PO

## 2019-04-29 PROCEDURE — 70551 MRI BRAIN STEM W/O DYE: CPT | Mod: 26,,, | Performed by: RADIOLOGY

## 2019-04-29 PROCEDURE — 70551 MRI BRAIN WITHOUT CONTRAST: ICD-10-PCS | Mod: 26,,, | Performed by: RADIOLOGY

## 2019-05-28 ENCOUNTER — PROCEDURE VISIT (OUTPATIENT)
Dept: NEUROLOGY | Facility: CLINIC | Age: 69
End: 2019-05-28
Payer: MEDICARE

## 2019-05-28 DIAGNOSIS — L98.9 NON-HEALING SKIN LESION: Primary | ICD-10-CM

## 2019-05-28 DIAGNOSIS — R20.0 BILATERAL HAND NUMBNESS: ICD-10-CM

## 2019-05-28 PROCEDURE — 95885 MUSC TST DONE W/NERV TST LIM: CPT | Mod: 59,S$GLB,, | Performed by: PSYCHIATRY & NEUROLOGY

## 2019-05-28 PROCEDURE — 95886 MUSC TEST DONE W/N TEST COMP: CPT | Mod: S$GLB,,, | Performed by: PSYCHIATRY & NEUROLOGY

## 2019-05-28 PROCEDURE — 95886 PR EMG COMPLETE, W/ NERVE CONDUCTION STUDIES, 5+ MUSCLES: ICD-10-PCS | Mod: S$GLB,,, | Performed by: PSYCHIATRY & NEUROLOGY

## 2019-05-28 PROCEDURE — 95911 NRV CNDJ TEST 9-10 STUDIES: CPT | Mod: S$GLB,,, | Performed by: PSYCHIATRY & NEUROLOGY

## 2019-05-28 PROCEDURE — 95911 PR NERVE CONDUCTION STUDY; 9-10 STUDIES: ICD-10-PCS | Mod: S$GLB,,, | Performed by: PSYCHIATRY & NEUROLOGY

## 2019-05-28 PROCEDURE — 95885 PR MUSC TST DONE W/NERV TST LIM: ICD-10-PCS | Mod: 59,S$GLB,, | Performed by: PSYCHIATRY & NEUROLOGY

## 2019-05-29 ENCOUNTER — TELEPHONE (OUTPATIENT)
Dept: NEUROLOGY | Facility: CLINIC | Age: 69
End: 2019-05-29

## 2019-05-29 NOTE — TELEPHONE ENCOUNTER
Dermatology referral placed due to nonhealing sores noted on left arm during EMG. Caution was made to avoid those areas with the EMG.

## 2019-05-29 NOTE — PROCEDURES
[facility]   [facility]  [address] [facility] [address] [place] [address]  [phone]             Full Name: Cori Avalos  Gender: Female  Patient ID: [id number] YOB: 1950      Visit Date: 5/28/2019 13:29  Age: 69 Years 1 Months Old  Height: 5 feet 2 inch      Sensory NCS      Nerve / Sites Rec. Site Onset Lat Peak Lat NP Amp Segments Distance Velocity Temp.     ms ms µV  cm m/s °C   L Median - Digit II (Antidromic)      Wrist Dig II 2.92 3.70 29.0 Wrist - Dig II 13 45 34      Ref.   ?3.80 ?10.0 Ref.  ?50    R Median - Digit II (Antidromic)      Wrist Dig II 2.92 3.80 28.7 Wrist - Dig II 13 45 34      Ref.   ?3.80 ?10.0 Ref.  ?50    R Ulnar - Digit V (Antidromic)      Wrist Dig V 2.03 2.92 49.0 Wrist - Dig V 11 54 34      Ref.   ?3.20 ?10.0 Ref.  ?50    L Median - Orthodromic (Dig II, Mid palm)      Dig II Wrist 1.93 2.55 78.6 Dig II - Wrist 13 67 34      Ref.   ?3.40 ?10.0 Ref.      L Ulnar - Orthodromic, (Dig V, Mid palm)      Dig V Wrist 1.35 1.88 16.9 Dig V - Wrist 11 81 34      Ref.   ?3.10 ?5.0 Ref.          Motor NCS      Nerve / Sites Muscle Latency Ref. Amplitude Ref. Amp % Duration Segments Distance Lat Diff Velocity Ref. Temp.     ms ms mV mV % ms  cm ms m/s m/s °C   L Median - APB      Wrist APB 3.59 ?4.00 6.3 ?5.0 100 6.25 Wrist - APB 7    34      Elbow APB 7.50  6.1  97.9 6.20 Elbow - Wrist 19 3.91 49 ?50 34   R Median - APB      Wrist APB 3.85 ?4.00 6.9 ?5.0 100 5.36 Wrist - APB 7    34      Elbow APB 7.19  6.2  88.8 5.57 Elbow - Wrist 17 3.33 51 ?50 34   L Ulnar - ADM      Wrist ADM 2.76 ?3.10 10.8 ?7.0 100 4.64 Wrist - ADM 6.5    34      B.Elbow ADM 6.15  9.1  84.1 5.10 B.Elbow - Wrist 20 3.39 59 ?50 34      A.Elbow ADM 7.24  9.0  83.7 5.26 A.Elbow - B.Elbow 9 1.09 82  34   R Ulnar - ADM      Wrist ADM 2.50 ?3.10 8.4 ?7.0 100 5.00 Wrist - ADM 6.5    34      B.Elbow ADM 5.94  7.2  85.9 5.16 B.Elbow - Wrist 20 3.44 58 ?50 34      A.Elbow ADM 7.34  7.1   83.9 5.26 A.Elbow - B.Elbow 9 1.41 64  34       F  Wave      Nerve Fmin Ref. Absent  F    ms ms    L Median - APB 27.76 ?31.00 No   L Ulnar - ADM 28.65 ?32.00 No   R Median - APB 26.93 ?31.00 No    R Ulnar - ADM 26.88 ?32.00 No        EMG Summary Table     Spontaneous Recruitment Activation Duration Amplitude Polyphasia Comment   Muscle Ins Act Fib Fasc Pattern - - - - -   R. First dorsal interosseous Normal 0 0 Normal Normal Normal Normal Normal Normal   R. Biceps brachii Normal 0 0 Normal Normal Normal Normal Normal Normal   R. Triceps brachii Normal 0 0 Normal Normal Normal Normal Normal Normal   R. Deltoid Normal 0 0 Normal Normal Normal Normal Normal Normal   R. Opponens pollicis Inc 0 0 Sl Dec Normal +1 +1 +1 Normal   L. Opponens pollicis Normal 0 0 Normal Normal +1 +1 +1 Normal   L. First dorsal interosseous Normal 0 0 Normal Normal Normal Normal Normal Normal         Summary: Right upper extremity nerve conduction studies show borderline prolonged peak latency of the median sensory nerve conduction study with low conduction velocity. Left upper extremity nerve conduction studies show relatively prolonged median palmar sensory peak latency when compared to the ulnar palmar sensory latency. There is slowing of the conduction velocity in the left median sensory and motor nerve conduction studies. There is no conduction block. The EMG study shows reduced recruitment and long duration, high amplitude motor unit potentials in the median innervated muscles bilaterally. There is no evidence of active denervation.     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 No evidence of neuropathy or cervical radiculopathy was present on the right side.     Thank you for referring to the [facility] in [place].  Please feel free to contact the clinic if you have any further questions regarding this study or report.     Halina Loving MD

## 2019-07-25 ENCOUNTER — TELEPHONE (OUTPATIENT)
Dept: NEUROLOGY | Facility: CLINIC | Age: 69
End: 2019-07-25

## 2019-07-25 NOTE — TELEPHONE ENCOUNTER
----- Message from Cam Isaac sent at 7/25/2019  9:52 AM CDT -----  Contact: pt  Type: Needs Medical Advice    Who Called:  pt    Best Call Back Number: [phone]  Additional Information: Need copy of nerve studies sent to Dr. Ryan Bocanegra and Dr. Melo Colin. Pt is having a procedure done on 7/31/19. Pt needs these records faxed over as soon as possible. Pt did not have a fax number for the offices.   Dr. Ryan Bocanegra phone- [phone]  Dr. Melo Colin phone - [phone]

## 2019-09-10 ENCOUNTER — OFFICE VISIT (OUTPATIENT)
Dept: RHEUMATOLOGY | Facility: CLINIC | Age: 69
End: 2019-09-10
Payer: MEDICARE

## 2019-09-10 ENCOUNTER — INFUSION (OUTPATIENT)
Dept: RHEUMATOLOGY | Facility: HOSPITAL | Age: 69
End: 2019-09-10
Attending: INTERNAL MEDICINE
Payer: MEDICARE

## 2019-09-10 VITALS
DIASTOLIC BLOOD PRESSURE: 76 MMHG | WEIGHT: 231.5 LBS | HEART RATE: 84 BPM | SYSTOLIC BLOOD PRESSURE: 118 MMHG | BODY MASS INDEX: 42.34 KG/M2 | RESPIRATION RATE: 20 BRPM

## 2019-09-10 DIAGNOSIS — E55.9 VITAMIN D DEFICIENCY: ICD-10-CM

## 2019-09-10 DIAGNOSIS — M17.0 PRIMARY OSTEOARTHRITIS OF BOTH KNEES: ICD-10-CM

## 2019-09-10 DIAGNOSIS — M81.0 OSTEOPOROSIS, POSTMENOPAUSAL: Primary | ICD-10-CM

## 2019-09-10 DIAGNOSIS — M47.816 SPONDYLOSIS OF LUMBAR REGION WITHOUT MYELOPATHY OR RADICULOPATHY: Chronic | ICD-10-CM

## 2019-09-10 DIAGNOSIS — D86.9 SARCOID: Chronic | ICD-10-CM

## 2019-09-10 DIAGNOSIS — Z79.899 HIGH RISK MEDICATION USE: ICD-10-CM

## 2019-09-10 DIAGNOSIS — M81.0 OSTEOPOROSIS, POSTMENOPAUSAL: Primary | Chronic | ICD-10-CM

## 2019-09-10 PROCEDURE — 99999 PR PBB SHADOW E&M-EST. PATIENT-LVL II: CPT | Mod: PBBFAC,,, | Performed by: INTERNAL MEDICINE

## 2019-09-10 PROCEDURE — 1101F PT FALLS ASSESS-DOCD LE1/YR: CPT | Mod: CPTII,S$GLB,, | Performed by: INTERNAL MEDICINE

## 2019-09-10 PROCEDURE — 96372 THER/PROPH/DIAG INJ SC/IM: CPT

## 2019-09-10 PROCEDURE — 99214 PR OFFICE/OUTPT VISIT, EST, LEVL IV, 30-39 MIN: ICD-10-PCS | Mod: S$GLB,,, | Performed by: INTERNAL MEDICINE

## 2019-09-10 PROCEDURE — 99214 OFFICE O/P EST MOD 30 MIN: CPT | Mod: S$GLB,,, | Performed by: INTERNAL MEDICINE

## 2019-09-10 PROCEDURE — 63600175 PHARM REV CODE 636 W HCPCS: Mod: JG | Performed by: PHYSICIAN ASSISTANT

## 2019-09-10 PROCEDURE — 1101F PR PT FALLS ASSESS DOC 0-1 FALLS W/OUT INJ PAST YR: ICD-10-PCS | Mod: CPTII,S$GLB,, | Performed by: INTERNAL MEDICINE

## 2019-09-10 PROCEDURE — 99999 PR PBB SHADOW E&M-EST. PATIENT-LVL II: ICD-10-PCS | Mod: PBBFAC,,, | Performed by: INTERNAL MEDICINE

## 2019-09-10 RX ORDER — ALBUTEROL SULFATE 90 UG/1
2 AEROSOL, METERED RESPIRATORY (INHALATION) EVERY 4 HOURS PRN
Refills: 0 | COMMUNITY
Start: 2019-07-10 | End: 2021-09-28 | Stop reason: SDUPTHER

## 2019-09-10 RX ORDER — ARIPIPRAZOLE 2 MG/1
TABLET ORAL
Refills: 3 | COMMUNITY
Start: 2019-07-31 | End: 2019-09-10

## 2019-09-10 RX ORDER — DIPHENOXYLATE HYDROCHLORIDE AND ATROPINE SULFATE 2.5; .025 MG/1; MG/1
2 TABLET ORAL
COMMUNITY
Start: 2015-06-30 | End: 2019-09-10 | Stop reason: SDUPTHER

## 2019-09-10 RX ORDER — TIZANIDINE 4 MG/1
22 TABLET ORAL EVERY 8 HOURS
Refills: 3 | COMMUNITY
Start: 2019-07-31 | End: 2022-11-25

## 2019-09-10 RX ORDER — FLUNISOLIDE 0.25 MG/ML
SOLUTION NASAL
Refills: 5 | COMMUNITY
Start: 2019-07-13 | End: 2020-03-12 | Stop reason: SDUPTHER

## 2019-09-10 RX ADMIN — DENOSUMAB 60 MG: 60 INJECTION SUBCUTANEOUS at 11:09

## 2019-09-10 NOTE — PATIENT INSTRUCTIONS
Denosumab injection  What is this medicine?  DENOSUMAB (den oh johnathon mab) slows bone breakdown. Prolia is used to treat osteoporosis in women after menopause and in men. Xgeva is used to prevent bone fractures and other bone problems caused by cancer bone metastases. Xgeva is also used to treat giant cell tumor of the bone.  How should I use this medicine?  This medicine is for injection under the skin. It is given by a health care professional in a hospital or clinic setting.  If you are getting Prolia, a special MedGuide will be given to you by the pharmacist with each prescription and refill. Be sure to read this information carefully each time.  For Prolia, talk to your pediatrician regarding the use of this medicine in children. Special care may be needed. For Xgeva, talk to your pediatrician regarding the use of this medicine in children. While this drug may be prescribed for children as young as 13 years for selected conditions, precautions do apply.  What side effects may I notice from receiving this medicine?  Side effects that you should report to your doctor or health care professional as soon as possible:  · allergic reactions like skin rash, itching or hives, swelling of the face, lips, or tongue  · breathing problems  · chest pain  · fast, irregular heartbeat  · feeling faint or lightheaded, falls  · fever, chills, or any other sign of infection  · muscle spasms, tightening, or twitches  · numbness or tingling  · skin blisters or bumps, or is dry, peels, or red  · slow healing or unexplained pain in the mouth or jaw  · unusual bleeding or bruising  Side effects that usually do not require medical attention (Report these to your doctor or health care professional if they continue or are bothersome.):  · muscle pain  · stomach upset, gas  What may interact with this medicine?  Do not take this medicine with any of the following medications:  · other medicines containing denosumab  This medicine may also  interact with the following medications:  · medicines that suppress the immune system  · medicines that treat cancer  · steroid medicines like prednisone or cortisone  What if I miss a dose?  It is important not to miss your dose. Call your doctor or health care professional if you are unable to keep an appointment.  Where should I keep my medicine?  This medicine is only given in a clinic, doctor's office, or other health care setting and will not be stored at home.  What should I tell my health care provider before I take this medicine?  They need to know if you have any of these conditions:  · dental disease  · eczema  · infection or history of infections  · kidney disease or on dialysis  · low blood calcium or vitamin D  · malabsorption syndrome  · scheduled to have surgery or tooth extraction  · taking medicine that contains denosumab  · thyroid or parathyroid disease  · an unusual reaction to denosumab, other medicines, foods, dyes, or preservatives  · pregnant or trying to get pregnant  · breast-feeding  What should I watch for while using this medicine?  Visit your doctor or health care professional for regular checks on your progress. Your doctor or health care professional may order blood tests and other tests to see how you are doing.  Call your doctor or health care professional if you get a cold or other infection while receiving this medicine. Do not treat yourself. This medicine may decrease your body's ability to fight infection.  You should make sure you get enough calcium and vitamin D while you are taking this medicine, unless your doctor tells you not to. Discuss the foods you eat and the vitamins you take with your health care professional.  See your dentist regularly. Brush and floss your teeth as directed. Before you have any dental work done, tell your dentist you are receiving this medicine.  Do not become pregnant while taking this medicine or for 5 months after stopping it. Women should  inform their doctor if they wish to become pregnant or think they might be pregnant. There is a potential for serious side effects to an unborn child. Talk to your health care professional or pharmacist for more information.  NOTE:This sheet is a summary. It may not cover all possible information. If you have questions about this medicine, talk to your doctor, pharmacist, or health care provider. Copyright© 2017 [employer]

## 2019-09-10 NOTE — PATIENT INSTRUCTIONS
OK for Prolia today- Bone strength Improving    Continue Vit D    Exercise daily    See Derm in [place] about possible cutaneous sarcoid and get Bx of nodule if indicated    Follow with Pulmonary on lung sarcoid    Flu shot today- high sstrength

## 2019-09-10 NOTE — NURSING
Lab Results   Component Value Date    CALCIUM 10.5 09/10/2019     Lab Results   Component Value Date    CREATININE 0.9 09/10/2019     Lab Results   Component Value Date    ESTGFRAFRICA >60 09/10/2019     Lab Results   Component Value Date    EGFRNONAA >60 09/10/2019     Lab Results   Component Value Date    GGPAZNLM29VS 35 08/13/2018       Printed information regarding Prolia given to pt. Instructed on s/s to report. Verbalized understanding.  Administered Prolia 60 mg/ml SQ in abd  Instructed to wait in clinic x 15 min. For monitoring.  Pt tolerated well without adverse event.  Discharged ambulatory with walker from clinic.

## 2019-09-10 NOTE — PROGRESS NOTES
RHEUMATOLOGY FOLLOWUP    CHIEF COMPLAINT:   osteoporosis-here for Prolia; increased skin nodules    PERTINENT HISTORY:  Mrs. Avalos has been followed in Rheumatology for a number ofyears for sarcoidosis and osteoporosis.  She is due for Prolia today.  She was last seen in March with normal creatinine and calcium and given 60 mg of Prolia.  She had a bone density today to see if she is responding.  She denies any ill effects from Prolia and has never developed any hypocalcemia.  She denies recent falls or fractures.  She has fractured her T11 vertebra years ago.  Laboratory studies were done today see below  She has tapered off of prednisone as well as off Plaquenil for her sarcoidosis.  She has noted increasing skin nodules on the forearms, legs and to lesser degree the abdomen recently.  She is having more fatigue and arthralgias and wonders whether sarcoid is reactivating.  She has had no worsening respiratory symptoms.  She has had no weight loss.  He has been no peripheral joint swelling.  Does have chronic right knee pain and knees are knee replacement.  She did left knee replacement in the past.  She does have chronic back pain and had a spondylolisthesis requiring a surgical fusion L4-5 in the fall 2018-see below  Her eyes been doing okay with no flares of uveitis.  She has enlarged liver but not felt to be from sarcoid-likely fatty liver.  Cardiology workup recently was said to be good  Some physicians had told her they do not believe she actually has sarcoid- .  There has been no flares of systemic features such as weight loss, fevers, chills, muscle weakness, new rashes, since she tapered off steroids or Plaquenil.    Pertinent recent history-  She had L4-5 fusion in late October.  Unfortunately she had complications including and allergic reaction to opioids.  She also had bladder retention and UTI.  She had good rehab for several weeks.  She still has pain in the low back 310 and continues to get better.   She has known degenerative arthritis in both the cervical and lumbar area.  She has occasional soreness in her neck ; no loss of bowel or bladder control or signs cord compression    . She presently has ongoing knee problems.  She has had a left knee   replacement in the past and ortho is seeing her right knee now and they want to   do surgery there as well.  In view of the complications following her back surgery she wants to put knee replacement on hold   She also has a right rotator cuff tear and going to   therapy.  She is using a cane a good bit more because of her knee.        REVIEW OF SYSTEMS:  GENERAL:  Her weight is actually stable.  She is not having in general no fever,   chills or sweats.    HEENT:  With no active symptoms or eye inflammation, parotids or submandibular   swelling.  Mild symptoms of dryness.   CARDIAC:  See HPI.  She is having hypertension and arrhythmias.  A question   whether the latter could be from sarcoid in the past.  She is on meds.   PULMONARY:  She has pulmonary scar tissue, which is stable.  Not clear at   present whether she has any active sarcoid.  No progressive cough shortness of breath.  HEMATOLOGIC:  She   is on chronic Eliquis, does occasionally   bruise.  GASTROINTESTINAL:  No recent nausea or vomiting.  BONES:  Osteoporosis on Prolia as mentioned for her fracture.  BACK:  Positive for chronic back pain and sciatica, sees pain management.  Recent back surgery-see HPI  VASCULAR:  Negative for any recent claudication or Raynauds.    MUSCULAR:  No progressive weakness, no myopathy from sarcoid.  SKIN:  She has multiple nodules into her skin.  No true erythema nodosum  ?sarcoid in the past, but recent biopsy was negative.  See  last note.    Past Medical History:   Diagnosis Date    AC (acromioclavicular) joint bone spurs     Acid reflux 4/14/2011    Adrenal insufficiency     Bilateral lower extremity edema     Depression     Encounter for blood transfusion 1978     Factor V Leiden     Falls frequently     History of supraventricular tachycardia     Hypertension     IBS (irritable bowel syndrome)     Long term current use of anticoagulant     Long term current use of systemic steroids     Meralgia paresthetica of left side     Osteoarthritis     Osteoporosis     Osteoporosis, post-menopausal     Restless leg syndrome     S/P ablation operation for arrhythmia     Sarcoidosis     Sleep apnea     cpap     Past Surgical History:   Procedure Laterality Date    ablation      tachycardia    APPENDECTOMY      ARTHROPLASTY-KNEE Left 12/13/2016    [facility] OR    BACK SURGERY      CARDIAC ELECTROPHYSIOLOGY STUDY AND ABLATION      CARPAL TUNNEL RELEASE      cataracts      CHOLECYSTECTOMY      COLECTOMY      COLON SURGERY      ECTOPIC PREGNANCY SURGERY      evacuation of ectopic pregnancy      EYE SURGERY      cataracts with iol    EYE SURGERY      FRACTURE SURGERY      Total knee replacement     HERNIA REPAIR      JOINT REPLACEMENT      LAMINECTOMY, SPINE, LUMBAR, WITH DISCECTOMY Right 3/6/2013    [facility] OR    left knee replacemant      LEG SURGERY      lazer to leg    LEG SURGERY      right knee replacement      sinus cleaning      SPINE SURGERY  2013 and 2018    TONSILLECTOMY      TUBAL LIGATION      uterine suspension      VEIN LIGATION AND STRIPPING       Family History   Problem Relation Age of Onset    Cancer Mother     Breast cancer Mother 65    Heart disease Father     Stroke Father     Hypertension Father     Hypertension Sister     Diabetes Daughter     Alzheimer's disease Maternal Grandmother      Social History     Socioeconomic History    Marital status:      Spouse name: Not on file    Number of children: Not on file    Years of education: Not on file    Highest education level: Not on file   Occupational History    Not on file   Social Needs     Financial resource strain: Not on file    Food insecurity:     Worry: Not on file     Inability: Not on file    Transportation needs:     Medical: Not on file     Non-medical: Not on file   Tobacco Use    Smoking status: Never Smoker    Smokeless tobacco: Never Used   Substance and Sexual Activity    Alcohol use: No    Drug use: No    Sexual activity: Not on file   Lifestyle    Physical activity:     Days per week: Not on file     Minutes per session: Not on file    Stress: Not on file   Relationships    Social connections:     Talks on phone: Not on file     Gets together: Not on file     Attends Zoroastrian service: Not on file     Active member of club or organization: Not on file     Attends meetings of clubs or organizations: Not on file     Relationship status: Not on file   Other Topics Concern    Not on file   Social History Narrative    ** Merged History Encounter **          Review of patient's allergies indicates:   Allergen Reactions    Neurontin [gabapentin] Edema    Penicillins Hives    Ceftin [cefuroxime axetil] Nausea And Vomiting    Dilaudid [hydromorphone]     Latex, natural rubber Hives    Neurontin [gabapentin] Swelling    Ceftin [cefuroxime axetil] Rash    Penicillins Rash         PHYSICAL EXAMINATION:  VITAL SIGNS:      HEENT:  With no active redness or tenderness.  No swelling or tenderness in the   parotids.  Notes nodes in her neck.  Normocephalic, atraumatic, alert and   oriented X3.  PERRLA.  Conjunctiva clear, sclera  non icteric.  No tonsillar   enlargement.  No pharyngeal erythema or exudate.  No ulcers or lesions noted.    Mucous membranes moist and pink.  Oral pharynx clear.  Neck supple, no JVD.    Normal ROM.  Thyroid normal.  No masses or tracheal deviation.  No cervical,   axillary or inguinal lymph node enlargement.  CHEST:  Lungs clear to auscultation and percussion.  CARDIOVASCULAR:  Normal rhythm.  Normal S1, S2.  No gallops today.  No murmurs.   ABDOMEN:   Mildly obese, but not tender. No organomegaly.  No tenderness over the liver  MUSCULOSKELETAL:  Muscle strength 5/5 in upper and lower extremities.  Normal joint   exam peripherally with no heat, redness or swelling  of her   hands.  No sarcoid dactylitis.  Knees with OA, the left has been replaced.  Right is 2+ tender with minimal effusion and marked crepitus range of motion 0-90   Calves are not tender   Pulses are normal.  Venous insufficiency noted in both lower legs with edema and stasis dermatitis.  Note she has had some vein work done recently as well  SKIN:  With multiple small lipomatous nodules noted in the arms and legs.  No   overlying erythema or tenderness.  No signs of E nodosum.  I doubt these her sarcoid granulomas    Results for orders placed or performed in visit on 09/10/19   Basic metabolic panel   Result Value Ref Range    Sodium 143 136 - 145 mmol/L    Potassium 4.1 3.5 - 5.1 mmol/L    Chloride 102 95 - 110 mmol/L    CO2 31 (H) 23 - 29 mmol/L    Glucose 97 70 - 110 mg/dL    BUN, Bld 17 8 - 23 mg/dL    Creatinine 0.9 0.5 - 1.4 mg/dL    Calcium 10.5 8.7 - 10.5 mg/dL    Anion Gap 10 8 - 16 mmol/L    eGFR if African American >60 >60 mL/min/1.73 m^2    eGFR if non African American >60 >60 mL/min/1.73 m^2     HNIQUE:  DEXA 09/10/2019  DXA scanning was performed over the left hip and lumbar spine.  Review of the images confirms satisfactory positioning and technique.    COMPARISON:  08/14/2017    FINDINGS:  The L1-L2 vertebral bone mineral density is equal to 0.991 g/cm squared with a T score of -1.5.  There has been no significant change relative to the prior study.    The left femoral neck bone mineral density is equal to 0.778 g/cm squared with a T score of -1.9.  There has been  a positive 6.0% statistically significant change relative to the prior study.    There is a 23.9% risk of a major osteoporotic fracture and a 4.8% risk of hip fracture in the next 10 years       IMPRESSIONS:   1.   Osteoporosis BY HISTORY WITH PRIOR COMPRESSION FRACTURE- on Prolia without prior reactions to therapy-ongoing and due today.  No contraindications to use-see labs  2.  Osteoarthritis, particularly involving her knees and spine.  Worst area likely the right knee, followed by her chronic spine pain   3.  Sarcoid by history with possible skin and lung involvement.  No signs of active involvement today and off steroids and Plaquenil .  Patient concerned that the skin nodules represent recurring sarcoid  4.  Chronic steroid use by history but presently off steroids  5.  Chronic anticoagulant use-Eliquis  6.  Chronic right shoulder pain with rotator cuff damage-putting off surgery.  7.  Degenerative disease of the lower spine with spondylolisthesis-SP L4-5 fusion surgery-October 18 and pain is slowly improving    PLAN:       OK for Prolia today- Bone strength Improving-discussed the above DEXA with her and showed her the results    Continue Vit D    Exercise daily    See Derm in [place] about possible cutaneous sarcoid and get Bx of nodule if indicated    Follow with Pulmonary on lung sarcoid    Flu shot today- high strength    Continue off prednisone; continue off Plaquenil     Continue followup with Pain Management doctors, cardiologist, , and new family doctor     Return to clinic in in 6 months for Prolia and routine lab      Prolonged visit: Over35 min spent in patient care and evaluation. Over 20 min of time used in reviewing current and prior labs, current DEXA, prior chest x-rays and present symptoms symptoms, discussing diagnostic possibilities, including the question of sarcoid activity,, counseling on medication, vitamin, vaccination, and exercise options, reviewing side effects of Prolia therapy, and coordination of care with  Family Dr   Patient's questions were all addressed and she understands our plans and risks.            /mattie [id number] kashmir(s)            VELASQUEZ/MINOO  dd: 04/23/2018 22:02:24 (CDT)  td:  04/24/2018 02:02:03 (CDT)  Doc ID   #[id number]  Job ID #[id number]    CC:     Prolonged visit: Over35 min spent in patient care and evaluation. Over  20 mintime used in reviewing recent labs and symptoms, discussing all diagnostic possibilities especially of the skin t, counseling on medication options, explaining complicationsof therapies vs disease, and coordination of care with  Family  /Pulm/  Cardiology. Patient's questions were all addressed and she understands  And agrees with our plans and risks.  We will continue to give her Prolia injections here.

## 2020-07-21 ENCOUNTER — TELEPHONE (OUTPATIENT)
Dept: RHEUMATOLOGY | Facility: CLINIC | Age: 70
End: 2020-07-21

## 2020-07-21 NOTE — TELEPHONE ENCOUNTER
----- Message from Stephie Solorzano sent at 7/21/2020  4:13 PM CDT -----  Regarding: retun call  Contact: Cori  Type:  Patient Returning Call    Who Called:Cori Avalos  Who Left Message for Patient:Ruthie   Does the patient know what this is regarding?:no   Would the patient rather a call back or a response via [facility]? Call back   Best Call Back Number:[phone]  Additional Information:

## 2020-07-21 NOTE — TELEPHONE ENCOUNTER
Spoke with pt and scheduled apt for 7.27.20 with labs at 1, Dr. Balderrama at 1.30 pm and dulce at 2. Pt verbalized understanding.

## 2020-07-21 NOTE — TELEPHONE ENCOUNTER
----- Message from Cori Bui sent at 7/21/2020  3:26 PM CDT -----  Type:  Needs Medical Advice    Who Called:  Pt  Cori   Symptoms (please be specific):   States she is calling to ask when she is needing to come in for her checkup and her Prolia injection   How long has patient had these symptoms:     Pharmacy name and phone #:     Would the patient rather a call back or a response via [facility]?   Call back  Best Call Back Number:   [phone]  Additional Information:  please call to inform//thanks/lh

## 2020-07-27 ENCOUNTER — INFUSION (OUTPATIENT)
Dept: INFUSION THERAPY | Facility: HOSPITAL | Age: 70
End: 2020-07-27
Attending: INTERNAL MEDICINE
Payer: MEDICARE

## 2020-07-27 ENCOUNTER — OFFICE VISIT (OUTPATIENT)
Dept: RHEUMATOLOGY | Facility: CLINIC | Age: 70
End: 2020-07-27
Payer: MEDICARE

## 2020-07-27 VITALS
TEMPERATURE: 98 F | DIASTOLIC BLOOD PRESSURE: 82 MMHG | BODY MASS INDEX: 42.71 KG/M2 | HEIGHT: 61 IN | SYSTOLIC BLOOD PRESSURE: 155 MMHG | OXYGEN SATURATION: 97 % | WEIGHT: 226.19 LBS | HEART RATE: 62 BPM | RESPIRATION RATE: 18 BRPM

## 2020-07-27 VITALS
HEART RATE: 57 BPM | HEIGHT: 61 IN | DIASTOLIC BLOOD PRESSURE: 77 MMHG | SYSTOLIC BLOOD PRESSURE: 153 MMHG | BODY MASS INDEX: 42.71 KG/M2 | WEIGHT: 226.19 LBS

## 2020-07-27 DIAGNOSIS — M75.101 ROTATOR CUFF TEAR ARTHROPATHY OF RIGHT SHOULDER: ICD-10-CM

## 2020-07-27 DIAGNOSIS — E55.9 VITAMIN D DEFICIENCY: ICD-10-CM

## 2020-07-27 DIAGNOSIS — Z79.01 LONG TERM CURRENT USE OF ANTICOAGULANT: Chronic | ICD-10-CM

## 2020-07-27 DIAGNOSIS — M81.0 OSTEOPOROSIS, POSTMENOPAUSAL: Primary | ICD-10-CM

## 2020-07-27 DIAGNOSIS — M81.0 OSTEOPOROSIS, POSTMENOPAUSAL: Primary | Chronic | ICD-10-CM

## 2020-07-27 DIAGNOSIS — D68.51 FACTOR V LEIDEN: Chronic | ICD-10-CM

## 2020-07-27 DIAGNOSIS — D86.9 SARCOID: Chronic | ICD-10-CM

## 2020-07-27 DIAGNOSIS — M17.0 PRIMARY OSTEOARTHRITIS OF BOTH KNEES: ICD-10-CM

## 2020-07-27 DIAGNOSIS — Z51.81 MEDICATION MONITORING ENCOUNTER: ICD-10-CM

## 2020-07-27 DIAGNOSIS — M12.811 ROTATOR CUFF TEAR ARTHROPATHY OF RIGHT SHOULDER: ICD-10-CM

## 2020-07-27 DIAGNOSIS — M47.816 SPONDYLOSIS OF LUMBAR REGION WITHOUT MYELOPATHY OR RADICULOPATHY: Chronic | ICD-10-CM

## 2020-07-27 DIAGNOSIS — S22.000S COMPRESSION FX, THORACIC SPINE, SEQUELA: ICD-10-CM

## 2020-07-27 PROCEDURE — 1159F MED LIST DOCD IN RCRD: CPT | Mod: S$GLB,,, | Performed by: INTERNAL MEDICINE

## 2020-07-27 PROCEDURE — 63600175 PHARM REV CODE 636 W HCPCS: Mod: JG | Performed by: INTERNAL MEDICINE

## 2020-07-27 PROCEDURE — 99214 PR OFFICE/OUTPT VISIT, EST, LEVL IV, 30-39 MIN: ICD-10-PCS | Mod: S$GLB,,, | Performed by: INTERNAL MEDICINE

## 2020-07-27 PROCEDURE — 3077F PR MOST RECENT SYSTOLIC BLOOD PRESSURE >= 140 MM HG: ICD-10-PCS | Mod: CPTII,S$GLB,, | Performed by: INTERNAL MEDICINE

## 2020-07-27 PROCEDURE — 1159F PR MEDICATION LIST DOCUMENTED IN MEDICAL RECORD: ICD-10-PCS | Mod: S$GLB,,, | Performed by: INTERNAL MEDICINE

## 2020-07-27 PROCEDURE — 3008F BODY MASS INDEX DOCD: CPT | Mod: CPTII,S$GLB,, | Performed by: INTERNAL MEDICINE

## 2020-07-27 PROCEDURE — 3078F PR MOST RECENT DIASTOLIC BLOOD PRESSURE < 80 MM HG: ICD-10-PCS | Mod: CPTII,S$GLB,, | Performed by: INTERNAL MEDICINE

## 2020-07-27 PROCEDURE — 96372 THER/PROPH/DIAG INJ SC/IM: CPT

## 2020-07-27 PROCEDURE — 99999 PR PBB SHADOW E&M-EST. PATIENT-LVL III: CPT | Mod: PBBFAC,,, | Performed by: INTERNAL MEDICINE

## 2020-07-27 PROCEDURE — 1125F AMNT PAIN NOTED PAIN PRSNT: CPT | Mod: S$GLB,,, | Performed by: INTERNAL MEDICINE

## 2020-07-27 PROCEDURE — 3008F PR BODY MASS INDEX (BMI) DOCUMENTED: ICD-10-PCS | Mod: CPTII,S$GLB,, | Performed by: INTERNAL MEDICINE

## 2020-07-27 PROCEDURE — 99214 OFFICE O/P EST MOD 30 MIN: CPT | Mod: S$GLB,,, | Performed by: INTERNAL MEDICINE

## 2020-07-27 PROCEDURE — 99999 PR PBB SHADOW E&M-EST. PATIENT-LVL III: ICD-10-PCS | Mod: PBBFAC,,, | Performed by: INTERNAL MEDICINE

## 2020-07-27 PROCEDURE — 1125F PR PAIN SEVERITY QUANTIFIED, PAIN PRESENT: ICD-10-PCS | Mod: S$GLB,,, | Performed by: INTERNAL MEDICINE

## 2020-07-27 PROCEDURE — 3078F DIAST BP <80 MM HG: CPT | Mod: CPTII,S$GLB,, | Performed by: INTERNAL MEDICINE

## 2020-07-27 PROCEDURE — 3077F SYST BP >= 140 MM HG: CPT | Mod: CPTII,S$GLB,, | Performed by: INTERNAL MEDICINE

## 2020-07-27 RX ORDER — BACLOFEN 10 MG/1
10 TABLET ORAL 3 TIMES DAILY
COMMUNITY
End: 2021-04-22

## 2020-07-27 RX ADMIN — DENOSUMAB 60 MG: 60 INJECTION SUBCUTANEOUS at 03:07

## 2020-07-27 NOTE — PROGRESS NOTES
RHEUMATOLOGY FOLLOWUP    CHIEF COMPLAINT:   osteoporosis-needs Prolia; cutaneous sarcoid-disease management    PERTINENT HISTORY:  Mrs. Avalos has been followed in Rheumatology for a number of years for sarcoidosis and osteoporosis with prior compression fracture.  She is due for Prolia today.  She was last seen in September 2019 with normal creatinine and calcium and given 60 mg of Prolia.  She had no reactions to the injection-has never developed any hypocalcemia.  She was continued off Plaquenil off prednisone.  She had a bone density done then that showed lumbar density -1.5 and hip density -1.9 with improvement.  The patient's next DEXA is due in March 2021 She denies recent falls or fractures.  She has fractured her T11 vertebra years ago.  Laboratory studies were done today see below . She has noted occasional skin nodules on the forearms, legs and to lesser degree the abdomen recently.  One of these was biopsied last year and was not sarcoid She describes intermittent fatigue and arthralgias .  She has a history of fibromyalgia.  She has had no worsening respiratory symptoms.  She has had no weight loss.  He has been no peripheral joint swelling.  Does have chronic right knee pain and has considered knee replacement-putting it off-see below.  She did left knee replacement in the past.  She does have chronic back pain and had a spondylolisthesis requiring a surgical fusion L4-5 in the fall 2018-see below  Her eyes been doing okay with no flares of uveitis.  She has enlarged liver but not felt to be from sarcoid-likely fatty liver.  Cardiology workup in the past showed no significant abnormalities.  Most recently she had implantation  Of spinal wires for stimulation for chronic back pain.  She is 2 weeks out from the procedure and she believes it has helped pain in her back significantly.  She uses an occasional tramadol; avoid all opiates  Some physicians had told her they do not believe she actually has  sarcoid- .  There has been no flares of systemic features such as weight loss, fevers, chills, muscle weakness, new rashes, since she tapered off steroids or Plaquenil.    Pertinent recent history-  She had L4-5 fusion in late October.  Unfortunately she had complications including and allergic reaction to opioids.  She also had bladder retention and UTI.  She had good rehab for several weeks.  She still has pain in the low back 310 and continues to get better.  She has known degenerative arthritis in both the cervical and lumbar area.  She has occasional soreness in her neck ; no loss of bowel or bladder control or signs cord compression    . She presently has ongoing knee problems.  She has had a left knee   replacement in the past and ortho is seeing her right knee now and they want to   do surgery there as well.  In view of the complications following her back surgery she wants to put knee replacement on hold   She also has a right rotator cuff tear and went to therapy.  It still hurts intermittently..  She is using a cane a good bit more because of her knee.        REVIEW OF SYSTEMS:  GENERAL:  Her weight is actually stable.  She is not having  fever,   chills or sweats.    HEENT:  With no active symptoms or eye inflammation, parotids or submandibular   swelling.  Mild symptoms of dryness.   CARDIAC:  See HPI.  She is having hypertension and arrhythmias.  A question   whether the latter could be from sarcoid in the past.  She is on meds.   PULMONARY:  She has pulmonary scar tissue, which is stable.  Not clear at   present whether she has any active sarcoid.  No progressive cough shortness of breath.  HEMATOLOGIC:  She   is on chronic Eliquis, does occasionally   bruise.  GASTROINTESTINAL:  No recent nausea or vomiting.  BONES:  Osteoporosis on Prolia as mentioned for her fracture.  BACK:  Positive for chronic back pain and sciatica, sees pain management.  Recent back surgery-see HPI implantation of spinal  wires  VASCULAR:  Negative for any recent claudication or Raynauds.    MUSCULAR:  No progressive weakness, no myopathy from sarcoid.  SKIN:  She has multiple nodules into her skin.  No true erythema nodosum  ?sarcoid in the past, but recent biopsy was negative.  See  last note.    Past Medical History:   Diagnosis Date    AC (acromioclavicular) joint bone spurs     Acid reflux 4/14/2011    Adrenal insufficiency     Bilateral lower extremity edema     Depression     Encounter for blood transfusion 1978    Factor V Leiden     Falls frequently     History of supraventricular tachycardia     Hypertension     IBS (irritable bowel syndrome)     Long term current use of anticoagulant     Long term current use of systemic steroids     Meralgia paresthetica of left side     Osteoarthritis     Osteoporosis     Osteoporosis, post-menopausal     Restless leg syndrome     S/P ablation operation for arrhythmia     Sarcoidosis     Sleep apnea     cpap     Past Surgical History:   Procedure Laterality Date    ablation      tachycardia    APPENDECTOMY      BACK SURGERY      CARDIAC ELECTROPHYSIOLOGY STUDY AND ABLATION      CARPAL TUNNEL RELEASE      cataracts      CHOLECYSTECTOMY      COLECTOMY      COLON SURGERY      ECTOPIC PREGNANCY SURGERY      evacuation of ectopic pregnancy      EYE SURGERY      cataracts with iol    EYE SURGERY      FRACTURE SURGERY      Total knee replacement     HERNIA REPAIR      JOINT REPLACEMENT      left knee replacemant      LEG SURGERY      lazer to leg    LEG SURGERY      right knee replacement      sinus cleaning      SPINE SURGERY  2013 and 2018    TONSILLECTOMY      TUBAL LIGATION      uterine suspension      VEIN LIGATION AND STRIPPING       Family History   Problem Relation Age of Onset    Cancer Mother     Breast cancer Mother 65    Heart disease Father     Stroke Father     Hypertension Father     Hypertension Sister     Diabetes Daughter   "   Alzheimer's disease Maternal Grandmother      Social History     Socioeconomic History    Marital status:      Spouse name: Not on file    Number of children: Not on file    Years of education: Not on file    Highest education level: Not on file   Occupational History    Not on file   Social Needs    Financial resource strain: Not on file    Food insecurity     Worry: Not on file     Inability: Not on file    Transportation needs     Medical: Not on file     Non-medical: Not on file   Tobacco Use    Smoking status: Never Smoker    Smokeless tobacco: Never Used   Substance and Sexual Activity    Alcohol use: No    Drug use: No    Sexual activity: Not on file   Lifestyle    Physical activity     Days per week: Not on file     Minutes per session: Not on file    Stress: Not on file   Relationships    Social connections     Talks on phone: Not on file     Gets together: Not on file     Attends Sabianism service: Not on file     Active member of club or organization: Not on file     Attends meetings of clubs or organizations: Not on file     Relationship status: Not on file   Other Topics Concern    Not on file   Social History Narrative    ** Merged History Encounter **          Review of patient's allergies indicates:   Allergen Reactions    Neurontin [gabapentin] Edema    Penicillins Hives    Ceftin [cefuroxime axetil] Nausea And Vomiting    Dilaudid [hydromorphone]     Latex, natural rubber Hives    Neurontin [gabapentin] Swelling    Ceftin [cefuroxime axetil] Rash    Penicillins Rash         PHYSICAL EXAMINATION:  VITAL SIGNS:    BP (!) 153/77   Pulse (!) 57   Ht 5' 1" (1.549 m)   Wt 102.6 kg (226 lb 3.1 oz)   BMI 42.74 kg/m²   HEENT:  With no active redness or tenderness.  No swelling or tenderness in the   parotids.  Notes nodes in her neck.  Normocephalic, atraumatic, alert and   oriented X3.  PERRLA.  Conjunctiva clear, sclera  non icteric.  No tonsillar   enlargement.  No " pharyngeal erythema or exudate.  No ulcers or lesions noted.    Mucous membranes moist and pink.  Oral pharynx clear.  Neck supple, no JVD.    Normal ROM.  Thyroid normal.  No masses or tracheal deviation.  No cervical,   axillary or inguinal lymph node enlargement.  CHEST:  Lungs clear to auscultation and percussion.  CARDIOVASCULAR:  Normal rhythm.  Normal S1, S2.  No gallops today.  No murmurs.   ABDOMEN:  Mildly obese, but not tender. No organomegaly.  No tenderness over the liver  MUSCULOSKELETAL:  Muscle strength 5/5 in upper and lower extremities.  Normal joint   exam peripherally with no heat, redness or swelling  of her   hands.  No sarcoid dactylitis.  Knees with OA, the left has been replaced.  Right is 1+ tender with minimal effusion and marked crepitus range of motion 0-80   Calves are not tender   Pulses are normal.  Venous insufficiency noted in both lower legs with edema and stasis dermatitis.  Note she has had some vein work done recently as well  SKIN:  With multiple small lipomatous nodules noted in the arms and legs.  No   overlying erythema or tenderness.  No signs of E nodosum.  I doubt these her sarcoid granulomas    Results for NIURKA CORDOBA (MRN [id number]) as of 7/28/2020 09:09   Ref. Range 7/27/2020 13:25   Sodium Latest Ref Range: 136 - 145 mmol/L 144   Potassium Latest Ref Range: 3.5 - 5.1 mmol/L 4.1   Chloride Latest Ref Range: 95 - 110 mmol/L 104   CO2 Latest Ref Range: 23 - 29 mmol/L 26   Anion Gap Latest Ref Range: 8 - 16 mmol/L 14   BUN, Bld Latest Ref Range: 8 - 23 mg/dL 32 (H)   Creatinine Latest Ref Range: 0.5 - 1.4 mg/dL 1.1   eGFR if non African American Latest Ref Range: >60 mL/min/1.73 m^2 50 (A)   eGFR if African American Latest Ref Range: >60 mL/min/1.73 m^2 58 (A)   Glucose Latest Ref Range: 70 - 110 mg/dL 92   Calcium Latest Ref Range: 8.7 - 10.5 mg/dL 9.6   Alkaline Phosphatase Latest Ref Range: 55 - 135 U/L 219 (H)   PROTEIN TOTAL Latest Ref Range: 6.0 - 8.4 g/dL 7.6    Albumin Latest Ref Range: 3.5 - 5.2 g/dL 3.9   BILIRUBIN TOTAL Latest Ref Range: 0.1 - 1.0 mg/dL 0.4   AST Latest Ref Range: 10 - 40 U/L 17   ALT Latest Ref Range: 10 - 44 U/L 17   CRP Latest Ref Range: 0.0 - 8.2 mg/L 13.9 (H)     Results for orders placed or performed in visit on 05/07/20   TSH   Result Value Ref Range    TSH 2.190 0.400 - 4.000 uIU/mL   T4, free   Result Value Ref Range    Free T4 1.36 0.78 - 2.19 ng/dL   T3, free   Result Value Ref Range    T3, Free 3.21 2.77 - 5.27 pg/mL   Thyroid Peroxidase and Thyroblobulin Ab   Result Value Ref Range    Thyroperoxidase Antibodies 0.3 0.0 - 9.0 IU/mL    Thyroglobulin Ab Screen <0.9 0.0 - 4.0 IU/mL     HNIQUE:  DEXA 09/10/2019  DXA scanning was performed over the left hip and lumbar spine.  Review of the images confirms satisfactory positioning and technique.    COMPARISON:  08/14/2017    FINDINGS:  The L1-L2 vertebral bone mineral density is equal to 0.991 g/cm squared with a T score of -1.5.  There has been no significant change relative to the prior study.    The left femoral neck bone mineral density is equal to 0.778 g/cm squared with a T score of -1.9.  There has been  a positive 6.0% statistically significant change relative to the prior study.    There is a 23.9% risk of a major osteoporotic fracture and a 4.8% risk of hip fracture in the next 10 years       IMPRESSIONS:   1.  Osteoporosis BY HISTORY WITH PRIOR COMPRESSION FRACTURE- on Prolia without prior reactions to therapy-ongoing and due today.  No contraindications to use-see labs-normal calcium and creatinine today  2.  Osteoarthritis, particularly involving her knees and spine.  Worst area likely the right knee, followed by her chronic spine pain   3.  Sarcoid by history with possible skin and lung involvement.  No signs of active involvement today and off steroids and Plaquenil .  Patient  Still concerned that the skin nodules represent recurring sarcoid, recent biopsy was negative  4.   Chronic steroid use by history but presently off steroids  5.  Chronic anticoagulant use-Eliquis  6.  Chronic right shoulder pain with rotator cuff damage-putting off surgery.  7.  Degenerative disease of the lower spine with spondylolisthesis-SP L4-5 fusion surgery-October 18   8.  Back pain seen by pain management recent implantation spinal stimulation wires    PLAN:   Maintain Prolia 60 mg every 6 months-okay for today's dose    Labs within 2 weeks of prolia shots    Continue daily exercises and fall prevention    Maintain Vit D3- 2000 units day    Weight loss will help Knee issues    Tumeric supplement- 1000 mg day with pepper    If needed, can see Peggy here for next prolia versus going to rheumatology in [place]      Prolonged visit: Over35 min spent in patient care and evaluation. Over 20 min of time used in reviewing current and prior labs, last DEXA, prior chest x-rays and present symptoms symptoms, discussing diagnostic possibilities, including the question of sarcoid activity,, counseling on medication, vitamin, supplements, weight loss and exercise options, reviewing side effects of Prolia therapy, and coordination of care with  Family Dr and pain management  Patient's questions were all addressed and she understands our plans and risks.            /mattie [id number] blank(s)            SL/HN  dd: 04/23/2018 22:02:24 (CDT)  td: 04/24/2018 02:02:03 (CDT)  Doc ID   #[id number]  Job ID #[id number]    CC:     Prolonged visit: Over35 min spent in patient care and evaluation. Over  20 mintime used in reviewing recent labs and symptoms, discussing all diagnostic possibilities especially of the skin t, counseling on medication options, explaining complicationsof therapies vs disease, and coordination of care with  Family  /Pulm/  Cardiology. Patient's questions were all addressed and she understands  And agrees with our plans and risks.  We will continue to give her Prolia injections here.

## 2020-07-27 NOTE — PATIENT INSTRUCTIONS
Maintain Prolia 60 mg every 6 months    Labs within 2 weeks of prolia shots    Continue daily exercises and fall prevention    Maintain Vit D3- 2000 units day    Weight loss will help Knee issues    Tumeric supplement- 1000 mg day with pepper    If needed, can see Peggy here for next prolia

## 2020-07-27 NOTE — NURSING
Printed information regarding Prolia given to pt. Instructed on s/s to report. Verbalized understanding.  Administered Prolia 60 mg/ml SQ in left abdomen.  Instructed to wait in clinic x 15 min. For monitoring.

## 2020-08-06 ENCOUNTER — TELEPHONE (OUTPATIENT)
Dept: RHEUMATOLOGY | Facility: CLINIC | Age: 70
End: 2020-08-06

## 2020-08-06 NOTE — TELEPHONE ENCOUNTER
Spoke with pt and she states that when she saw Dr. Balderrama he advised her to take Tumeric and on the bottle it says to consult with your physician first if you have blood clotting issues and she does. States she also has had her gallbladder removed  Would like to make sure that it is okay to take.

## 2020-08-06 NOTE — TELEPHONE ENCOUNTER
Yes ok to take  Tell her start w/lower dose to make sure she will tolerate ok then can increase higher later on

## 2020-08-06 NOTE — TELEPHONE ENCOUNTER
----- Message from Maria A García sent at 8/6/2020  3:04 PM CDT -----  Pt called to speak with the office about some medication she wants to take it's call your Doctor if you have clotting issues please advise pt at [phone]

## 2021-01-08 ENCOUNTER — IMMUNIZATION (OUTPATIENT)
Dept: FAMILY MEDICINE | Facility: CLINIC | Age: 71
End: 2021-01-08
Payer: MEDICARE

## 2021-01-08 ENCOUNTER — PATIENT MESSAGE (OUTPATIENT)
Dept: FAMILY MEDICINE | Facility: CLINIC | Age: 71
End: 2021-01-08

## 2021-01-08 DIAGNOSIS — Z23 NEED FOR VACCINATION: ICD-10-CM

## 2021-01-08 PROCEDURE — 91300 COVID-19, MRNA, LNP-S, PF, 30 MCG/0.3 ML DOSE VACCINE: CPT | Mod: PBBFAC | Performed by: INTERNAL MEDICINE

## 2021-01-26 ENCOUNTER — LAB VISIT (OUTPATIENT)
Dept: LAB | Facility: HOSPITAL | Age: 71
End: 2021-01-26
Attending: INTERNAL MEDICINE
Payer: MEDICARE

## 2021-01-26 DIAGNOSIS — Z51.81 MEDICATION MONITORING ENCOUNTER: ICD-10-CM

## 2021-01-26 LAB
ANION GAP SERPL CALC-SCNC: 11 MMOL/L (ref 8–16)
BUN SERPL-MCNC: 27 MG/DL (ref 8–23)
CALCIUM SERPL-MCNC: 9.7 MG/DL (ref 8.7–10.5)
CHLORIDE SERPL-SCNC: 105 MMOL/L (ref 95–110)
CO2 SERPL-SCNC: 31 MMOL/L (ref 23–29)
CREAT SERPL-MCNC: 1.2 MG/DL (ref 0.5–1.4)
EST. GFR  (AFRICAN AMERICAN): 52 ML/MIN/1.73 M^2
EST. GFR  (NON AFRICAN AMERICAN): 45 ML/MIN/1.73 M^2
GLUCOSE SERPL-MCNC: 70 MG/DL (ref 70–110)
POTASSIUM SERPL-SCNC: 3.9 MMOL/L (ref 3.5–5.1)
SODIUM SERPL-SCNC: 147 MMOL/L (ref 136–145)

## 2021-01-26 PROCEDURE — 80048 BASIC METABOLIC PNL TOTAL CA: CPT | Mod: PO

## 2021-01-26 PROCEDURE — 36415 COLL VENOUS BLD VENIPUNCTURE: CPT | Mod: PO

## 2021-01-29 ENCOUNTER — IMMUNIZATION (OUTPATIENT)
Dept: FAMILY MEDICINE | Facility: CLINIC | Age: 71
End: 2021-01-29
Payer: MEDICARE

## 2021-01-29 DIAGNOSIS — Z23 NEED FOR VACCINATION: Primary | ICD-10-CM

## 2021-01-29 PROCEDURE — 91300 COVID-19, MRNA, LNP-S, PF, 30 MCG/0.3 ML DOSE VACCINE: CPT | Mod: PBBFAC | Performed by: FAMILY MEDICINE

## 2021-01-29 PROCEDURE — 0002A COVID-19, MRNA, LNP-S, PF, 30 MCG/0.3 ML DOSE VACCINE: CPT | Mod: PBBFAC | Performed by: FAMILY MEDICINE

## 2021-02-10 ENCOUNTER — TELEPHONE (OUTPATIENT)
Dept: RHEUMATOLOGY | Facility: CLINIC | Age: 71
End: 2021-02-10

## 2021-02-11 ENCOUNTER — INFUSION (OUTPATIENT)
Dept: INFUSION THERAPY | Facility: HOSPITAL | Age: 71
End: 2021-02-11
Attending: PHYSICIAN ASSISTANT
Payer: MEDICARE

## 2021-02-11 ENCOUNTER — TELEPHONE (OUTPATIENT)
Dept: RHEUMATOLOGY | Facility: CLINIC | Age: 71
End: 2021-02-11

## 2021-02-11 ENCOUNTER — OFFICE VISIT (OUTPATIENT)
Dept: RHEUMATOLOGY | Facility: CLINIC | Age: 71
End: 2021-02-11
Payer: MEDICARE

## 2021-02-11 VITALS
DIASTOLIC BLOOD PRESSURE: 79 MMHG | HEIGHT: 61 IN | BODY MASS INDEX: 44.58 KG/M2 | WEIGHT: 236.13 LBS | HEART RATE: 74 BPM | SYSTOLIC BLOOD PRESSURE: 132 MMHG

## 2021-02-11 DIAGNOSIS — M75.101 ROTATOR CUFF TEAR ARTHROPATHY OF RIGHT SHOULDER: ICD-10-CM

## 2021-02-11 DIAGNOSIS — M12.811 ROTATOR CUFF TEAR ARTHROPATHY OF RIGHT SHOULDER: ICD-10-CM

## 2021-02-11 DIAGNOSIS — S22.000S COMPRESSION FX, THORACIC SPINE, SEQUELA: ICD-10-CM

## 2021-02-11 DIAGNOSIS — D68.51 FACTOR V LEIDEN: ICD-10-CM

## 2021-02-11 DIAGNOSIS — M81.0 OSTEOPOROSIS, POSTMENOPAUSAL: Primary | ICD-10-CM

## 2021-02-11 DIAGNOSIS — M47.816 SPONDYLOSIS OF LUMBAR REGION WITHOUT MYELOPATHY OR RADICULOPATHY: ICD-10-CM

## 2021-02-11 DIAGNOSIS — D86.9 SARCOID: ICD-10-CM

## 2021-02-11 DIAGNOSIS — Z79.899 HIGH RISK MEDICATION USE: ICD-10-CM

## 2021-02-11 DIAGNOSIS — M17.0 PRIMARY OSTEOARTHRITIS OF BOTH KNEES: ICD-10-CM

## 2021-02-11 PROCEDURE — 1157F PR ADVANCE CARE PLAN OR EQUIV PRESENT IN MEDICAL RECORD: ICD-10-PCS | Mod: S$GLB,,, | Performed by: PHYSICIAN ASSISTANT

## 2021-02-11 PROCEDURE — 1159F PR MEDICATION LIST DOCUMENTED IN MEDICAL RECORD: ICD-10-PCS | Mod: S$GLB,,, | Performed by: PHYSICIAN ASSISTANT

## 2021-02-11 PROCEDURE — 1157F ADVNC CARE PLAN IN RCRD: CPT | Mod: S$GLB,,, | Performed by: PHYSICIAN ASSISTANT

## 2021-02-11 PROCEDURE — 1125F PR PAIN SEVERITY QUANTIFIED, PAIN PRESENT: ICD-10-PCS | Mod: S$GLB,,, | Performed by: PHYSICIAN ASSISTANT

## 2021-02-11 PROCEDURE — 3075F PR MOST RECENT SYSTOLIC BLOOD PRESS GE 130-139MM HG: ICD-10-PCS | Mod: CPTII,S$GLB,, | Performed by: PHYSICIAN ASSISTANT

## 2021-02-11 PROCEDURE — 3008F PR BODY MASS INDEX (BMI) DOCUMENTED: ICD-10-PCS | Mod: CPTII,S$GLB,, | Performed by: PHYSICIAN ASSISTANT

## 2021-02-11 PROCEDURE — 63600175 PHARM REV CODE 636 W HCPCS: Mod: JG | Performed by: INTERNAL MEDICINE

## 2021-02-11 PROCEDURE — 3078F PR MOST RECENT DIASTOLIC BLOOD PRESSURE < 80 MM HG: ICD-10-PCS | Mod: CPTII,S$GLB,, | Performed by: PHYSICIAN ASSISTANT

## 2021-02-11 PROCEDURE — 96372 THER/PROPH/DIAG INJ SC/IM: CPT

## 2021-02-11 PROCEDURE — 99999 PR PBB SHADOW E&M-EST. PATIENT-LVL IV: CPT | Mod: PBBFAC,,, | Performed by: PHYSICIAN ASSISTANT

## 2021-02-11 PROCEDURE — 99214 PR OFFICE/OUTPT VISIT, EST, LEVL IV, 30-39 MIN: ICD-10-PCS | Mod: S$GLB,,, | Performed by: PHYSICIAN ASSISTANT

## 2021-02-11 PROCEDURE — 1125F AMNT PAIN NOTED PAIN PRSNT: CPT | Mod: S$GLB,,, | Performed by: PHYSICIAN ASSISTANT

## 2021-02-11 PROCEDURE — 3008F BODY MASS INDEX DOCD: CPT | Mod: CPTII,S$GLB,, | Performed by: PHYSICIAN ASSISTANT

## 2021-02-11 PROCEDURE — 3075F SYST BP GE 130 - 139MM HG: CPT | Mod: CPTII,S$GLB,, | Performed by: PHYSICIAN ASSISTANT

## 2021-02-11 PROCEDURE — 99999 PR PBB SHADOW E&M-EST. PATIENT-LVL IV: ICD-10-PCS | Mod: PBBFAC,,, | Performed by: PHYSICIAN ASSISTANT

## 2021-02-11 PROCEDURE — 1159F MED LIST DOCD IN RCRD: CPT | Mod: S$GLB,,, | Performed by: PHYSICIAN ASSISTANT

## 2021-02-11 PROCEDURE — 99214 OFFICE O/P EST MOD 30 MIN: CPT | Mod: S$GLB,,, | Performed by: PHYSICIAN ASSISTANT

## 2021-02-11 PROCEDURE — 3078F DIAST BP <80 MM HG: CPT | Mod: CPTII,S$GLB,, | Performed by: PHYSICIAN ASSISTANT

## 2021-02-11 RX ADMIN — DENOSUMAB 60 MG: 60 INJECTION SUBCUTANEOUS at 12:02

## 2021-02-18 ENCOUNTER — TELEPHONE (OUTPATIENT)
Dept: FAMILY MEDICINE | Facility: CLINIC | Age: 71
End: 2021-02-18

## 2021-02-19 ENCOUNTER — OFFICE VISIT (OUTPATIENT)
Dept: FAMILY MEDICINE | Facility: CLINIC | Age: 71
End: 2021-02-19
Payer: MEDICARE

## 2021-02-19 VITALS
HEIGHT: 61 IN | HEART RATE: 61 BPM | DIASTOLIC BLOOD PRESSURE: 80 MMHG | WEIGHT: 240.06 LBS | BODY MASS INDEX: 45.32 KG/M2 | OXYGEN SATURATION: 99 % | SYSTOLIC BLOOD PRESSURE: 112 MMHG

## 2021-02-19 DIAGNOSIS — J40 BRONCHITIS: Primary | ICD-10-CM

## 2021-02-19 PROCEDURE — 3008F PR BODY MASS INDEX (BMI) DOCUMENTED: ICD-10-PCS | Mod: CPTII,S$GLB,, | Performed by: PHYSICIAN ASSISTANT

## 2021-02-19 PROCEDURE — 99999 PR PBB SHADOW E&M-EST. PATIENT-LVL IV: CPT | Mod: PBBFAC,,, | Performed by: PHYSICIAN ASSISTANT

## 2021-02-19 PROCEDURE — 1157F PR ADVANCE CARE PLAN OR EQUIV PRESENT IN MEDICAL RECORD: ICD-10-PCS | Mod: S$GLB,,, | Performed by: PHYSICIAN ASSISTANT

## 2021-02-19 PROCEDURE — 3074F PR MOST RECENT SYSTOLIC BLOOD PRESSURE < 130 MM HG: ICD-10-PCS | Mod: CPTII,S$GLB,, | Performed by: PHYSICIAN ASSISTANT

## 2021-02-19 PROCEDURE — 1159F MED LIST DOCD IN RCRD: CPT | Mod: S$GLB,,, | Performed by: PHYSICIAN ASSISTANT

## 2021-02-19 PROCEDURE — 1157F ADVNC CARE PLAN IN RCRD: CPT | Mod: S$GLB,,, | Performed by: PHYSICIAN ASSISTANT

## 2021-02-19 PROCEDURE — 1159F PR MEDICATION LIST DOCUMENTED IN MEDICAL RECORD: ICD-10-PCS | Mod: S$GLB,,, | Performed by: PHYSICIAN ASSISTANT

## 2021-02-19 PROCEDURE — 3074F SYST BP LT 130 MM HG: CPT | Mod: CPTII,S$GLB,, | Performed by: PHYSICIAN ASSISTANT

## 2021-02-19 PROCEDURE — 99213 OFFICE O/P EST LOW 20 MIN: CPT | Mod: S$GLB,,, | Performed by: PHYSICIAN ASSISTANT

## 2021-02-19 PROCEDURE — 1125F AMNT PAIN NOTED PAIN PRSNT: CPT | Mod: S$GLB,,, | Performed by: PHYSICIAN ASSISTANT

## 2021-02-19 PROCEDURE — 99999 PR PBB SHADOW E&M-EST. PATIENT-LVL IV: ICD-10-PCS | Mod: PBBFAC,,, | Performed by: PHYSICIAN ASSISTANT

## 2021-02-19 PROCEDURE — 99213 PR OFFICE/OUTPT VISIT, EST, LEVL III, 20-29 MIN: ICD-10-PCS | Mod: S$GLB,,, | Performed by: PHYSICIAN ASSISTANT

## 2021-02-19 PROCEDURE — 1125F PR PAIN SEVERITY QUANTIFIED, PAIN PRESENT: ICD-10-PCS | Mod: S$GLB,,, | Performed by: PHYSICIAN ASSISTANT

## 2021-02-19 PROCEDURE — 3008F BODY MASS INDEX DOCD: CPT | Mod: CPTII,S$GLB,, | Performed by: PHYSICIAN ASSISTANT

## 2021-02-19 PROCEDURE — 3079F DIAST BP 80-89 MM HG: CPT | Mod: CPTII,S$GLB,, | Performed by: PHYSICIAN ASSISTANT

## 2021-02-19 PROCEDURE — 3079F PR MOST RECENT DIASTOLIC BLOOD PRESSURE 80-89 MM HG: ICD-10-PCS | Mod: CPTII,S$GLB,, | Performed by: PHYSICIAN ASSISTANT

## 2021-02-19 RX ORDER — DOXYCYCLINE 100 MG/1
100 CAPSULE ORAL 2 TIMES DAILY
Qty: 20 CAPSULE | Refills: 0 | Status: SHIPPED | OUTPATIENT
Start: 2021-02-19 | End: 2021-03-01

## 2021-02-19 RX ORDER — INFLUENZA A VIRUS A/MICHIGAN/45/2015 X-275 (H1N1) ANTIGEN (FORMALDEHYDE INACTIVATED), INFLUENZA A VIRUS A/SINGAPORE/INFIMH-16-0019/2016 IVR-186 (H3N2) ANTIGEN (FORMALDEHYDE INACTIVATED), INFLUENZA B VIRUS B/PHUKET/3073/2013 ANTIGEN (FORMALDEHYDE INACTIVATED), AND INFLUENZA B VIRUS B/MARYLAND/15/2016 BX-69A ANTIGEN (FORMALDEHYDE INACTIVATED) 60; 60; 60; 60 UG/.7ML; UG/.7ML; UG/.7ML; UG/.7ML
INJECTION, SUSPENSION INTRAMUSCULAR
Status: ON HOLD | COMMUNITY
End: 2021-04-27 | Stop reason: CLARIF

## 2021-02-19 RX ORDER — IBUPROFEN 800 MG/1
800 TABLET ORAL EVERY 6 HOURS PRN
COMMUNITY

## 2021-02-19 RX ORDER — BENZONATATE 100 MG/1
100 CAPSULE ORAL 3 TIMES DAILY PRN
COMMUNITY
Start: 2020-11-29 | End: 2023-05-25

## 2021-02-19 RX ORDER — ALBUTEROL SULFATE 0.83 MG/ML
SOLUTION RESPIRATORY (INHALATION)
COMMUNITY
Start: 2020-11-30 | End: 2023-05-15 | Stop reason: CLARIF

## 2021-02-19 RX ORDER — PREDNISONE 10 MG/1
TABLET ORAL
Qty: 9 TABLET | Refills: 0 | Status: SHIPPED | OUTPATIENT
Start: 2021-02-19 | End: 2021-02-25

## 2021-02-24 ENCOUNTER — TELEPHONE (OUTPATIENT)
Dept: FAMILY MEDICINE | Facility: CLINIC | Age: 71
End: 2021-02-24

## 2021-03-11 ENCOUNTER — TELEPHONE (OUTPATIENT)
Dept: FAMILY MEDICINE | Facility: CLINIC | Age: 71
End: 2021-03-11

## 2021-04-12 ENCOUNTER — TELEPHONE (OUTPATIENT)
Dept: NEUROLOGY | Facility: CLINIC | Age: 71
End: 2021-04-12

## 2021-04-27 PROBLEM — R55 SYNCOPE AND COLLAPSE: Status: ACTIVE | Noted: 2021-04-27

## 2021-05-27 ENCOUNTER — NURSE TRIAGE (OUTPATIENT)
Dept: ADMINISTRATIVE | Facility: CLINIC | Age: 71
End: 2021-05-27

## 2021-07-21 ENCOUNTER — TELEPHONE (OUTPATIENT)
Dept: FAMILY MEDICINE | Facility: CLINIC | Age: 71
End: 2021-07-21

## 2021-08-05 ENCOUNTER — LAB VISIT (OUTPATIENT)
Dept: LAB | Facility: HOSPITAL | Age: 71
End: 2021-08-05
Attending: PHYSICIAN ASSISTANT
Payer: MEDICARE

## 2021-08-05 DIAGNOSIS — M81.0 OSTEOPOROSIS, POSTMENOPAUSAL: ICD-10-CM

## 2021-08-05 DIAGNOSIS — S22.000S COMPRESSION FX, THORACIC SPINE, SEQUELA: ICD-10-CM

## 2021-08-05 LAB
25(OH)D3+25(OH)D2 SERPL-MCNC: 54 NG/ML (ref 30–96)
ALBUMIN SERPL BCP-MCNC: 3.7 G/DL (ref 3.5–5.2)
ALP SERPL-CCNC: 121 U/L (ref 55–135)
ALT SERPL W/O P-5'-P-CCNC: 22 U/L (ref 10–44)
ANION GAP SERPL CALC-SCNC: 10 MMOL/L (ref 8–16)
AST SERPL-CCNC: 19 U/L (ref 10–40)
BILIRUB SERPL-MCNC: 0.5 MG/DL (ref 0.1–1)
BUN SERPL-MCNC: 22 MG/DL (ref 8–23)
CALCIUM SERPL-MCNC: 10 MG/DL (ref 8.7–10.5)
CHLORIDE SERPL-SCNC: 104 MMOL/L (ref 95–110)
CO2 SERPL-SCNC: 28 MMOL/L (ref 23–29)
CREAT SERPL-MCNC: 1 MG/DL (ref 0.5–1.4)
EST. GFR  (AFRICAN AMERICAN): >60 ML/MIN/1.73 M^2
EST. GFR  (NON AFRICAN AMERICAN): 56 ML/MIN/1.73 M^2
GLUCOSE SERPL-MCNC: 125 MG/DL (ref 70–110)
POTASSIUM SERPL-SCNC: 3.9 MMOL/L (ref 3.5–5.1)
PROT SERPL-MCNC: 7.4 G/DL (ref 6–8.4)
SODIUM SERPL-SCNC: 142 MMOL/L (ref 136–145)
T4 SERPL-MCNC: 7.8 UG/DL (ref 4.5–11.5)
TSH SERPL DL<=0.005 MIU/L-ACNC: 1.51 UIU/ML (ref 0.4–4)

## 2021-08-05 PROCEDURE — 82306 VITAMIN D 25 HYDROXY: CPT | Performed by: PHYSICIAN ASSISTANT

## 2021-08-05 PROCEDURE — 84436 ASSAY OF TOTAL THYROXINE: CPT | Performed by: PHYSICIAN ASSISTANT

## 2021-08-05 PROCEDURE — 36415 COLL VENOUS BLD VENIPUNCTURE: CPT | Mod: PO | Performed by: PHYSICIAN ASSISTANT

## 2021-08-05 PROCEDURE — 80053 COMPREHEN METABOLIC PANEL: CPT | Mod: PO | Performed by: PHYSICIAN ASSISTANT

## 2021-08-05 PROCEDURE — 84443 ASSAY THYROID STIM HORMONE: CPT | Performed by: PHYSICIAN ASSISTANT

## 2021-08-11 ENCOUNTER — TELEPHONE (OUTPATIENT)
Dept: RHEUMATOLOGY | Facility: CLINIC | Age: 71
End: 2021-08-11

## 2021-08-12 ENCOUNTER — INFUSION (OUTPATIENT)
Dept: INFUSION THERAPY | Facility: HOSPITAL | Age: 71
End: 2021-08-12
Attending: PHYSICIAN ASSISTANT
Payer: MEDICARE

## 2021-08-12 ENCOUNTER — OFFICE VISIT (OUTPATIENT)
Dept: RHEUMATOLOGY | Facility: CLINIC | Age: 71
End: 2021-08-12
Payer: MEDICARE

## 2021-08-12 VITALS
SYSTOLIC BLOOD PRESSURE: 135 MMHG | HEIGHT: 63 IN | WEIGHT: 232.13 LBS | HEART RATE: 68 BPM | DIASTOLIC BLOOD PRESSURE: 71 MMHG | BODY MASS INDEX: 41.13 KG/M2

## 2021-08-12 VITALS
DIASTOLIC BLOOD PRESSURE: 74 MMHG | HEART RATE: 61 BPM | TEMPERATURE: 97 F | WEIGHT: 232.13 LBS | RESPIRATION RATE: 16 BRPM | BODY MASS INDEX: 41.12 KG/M2 | OXYGEN SATURATION: 98 % | SYSTOLIC BLOOD PRESSURE: 117 MMHG

## 2021-08-12 DIAGNOSIS — M81.0 OSTEOPOROSIS, POSTMENOPAUSAL: Primary | ICD-10-CM

## 2021-08-12 DIAGNOSIS — M47.816 SPONDYLOSIS OF LUMBAR REGION WITHOUT MYELOPATHY OR RADICULOPATHY: ICD-10-CM

## 2021-08-12 DIAGNOSIS — D68.51 FACTOR V LEIDEN: ICD-10-CM

## 2021-08-12 DIAGNOSIS — D86.9 SARCOID: ICD-10-CM

## 2021-08-12 DIAGNOSIS — M12.811 ROTATOR CUFF TEAR ARTHROPATHY OF RIGHT SHOULDER: ICD-10-CM

## 2021-08-12 DIAGNOSIS — Z79.899 HIGH RISK MEDICATION USE: ICD-10-CM

## 2021-08-12 DIAGNOSIS — M75.101 ROTATOR CUFF TEAR ARTHROPATHY OF RIGHT SHOULDER: ICD-10-CM

## 2021-08-12 PROCEDURE — 3288F FALL RISK ASSESSMENT DOCD: CPT | Mod: CPTII,S$GLB,, | Performed by: PHYSICIAN ASSISTANT

## 2021-08-12 PROCEDURE — 1160F PR REVIEW ALL MEDS BY PRESCRIBER/CLIN PHARMACIST DOCUMENTED: ICD-10-PCS | Mod: CPTII,S$GLB,, | Performed by: PHYSICIAN ASSISTANT

## 2021-08-12 PROCEDURE — 3288F PR FALLS RISK ASSESSMENT DOCUMENTED: ICD-10-PCS | Mod: CPTII,S$GLB,, | Performed by: PHYSICIAN ASSISTANT

## 2021-08-12 PROCEDURE — 1159F MED LIST DOCD IN RCRD: CPT | Mod: CPTII,S$GLB,, | Performed by: PHYSICIAN ASSISTANT

## 2021-08-12 PROCEDURE — 3075F SYST BP GE 130 - 139MM HG: CPT | Mod: CPTII,S$GLB,, | Performed by: PHYSICIAN ASSISTANT

## 2021-08-12 PROCEDURE — 3078F DIAST BP <80 MM HG: CPT | Mod: CPTII,S$GLB,, | Performed by: PHYSICIAN ASSISTANT

## 2021-08-12 PROCEDURE — 99214 OFFICE O/P EST MOD 30 MIN: CPT | Mod: S$GLB,,, | Performed by: PHYSICIAN ASSISTANT

## 2021-08-12 PROCEDURE — 96372 THER/PROPH/DIAG INJ SC/IM: CPT

## 2021-08-12 PROCEDURE — 3008F BODY MASS INDEX DOCD: CPT | Mod: CPTII,S$GLB,, | Performed by: PHYSICIAN ASSISTANT

## 2021-08-12 PROCEDURE — 3044F HG A1C LEVEL LT 7.0%: CPT | Mod: CPTII,S$GLB,, | Performed by: PHYSICIAN ASSISTANT

## 2021-08-12 PROCEDURE — 99999 PR PBB SHADOW E&M-EST. PATIENT-LVL V: ICD-10-PCS | Mod: PBBFAC,,, | Performed by: PHYSICIAN ASSISTANT

## 2021-08-12 PROCEDURE — 1101F PR PT FALLS ASSESS DOC 0-1 FALLS W/OUT INJ PAST YR: ICD-10-PCS | Mod: CPTII,S$GLB,, | Performed by: PHYSICIAN ASSISTANT

## 2021-08-12 PROCEDURE — 1101F PT FALLS ASSESS-DOCD LE1/YR: CPT | Mod: CPTII,S$GLB,, | Performed by: PHYSICIAN ASSISTANT

## 2021-08-12 PROCEDURE — 1157F PR ADVANCE CARE PLAN OR EQUIV PRESENT IN MEDICAL RECORD: ICD-10-PCS | Mod: CPTII,S$GLB,, | Performed by: PHYSICIAN ASSISTANT

## 2021-08-12 PROCEDURE — 3008F PR BODY MASS INDEX (BMI) DOCUMENTED: ICD-10-PCS | Mod: CPTII,S$GLB,, | Performed by: PHYSICIAN ASSISTANT

## 2021-08-12 PROCEDURE — 1125F AMNT PAIN NOTED PAIN PRSNT: CPT | Mod: CPTII,S$GLB,, | Performed by: PHYSICIAN ASSISTANT

## 2021-08-12 PROCEDURE — 1160F RVW MEDS BY RX/DR IN RCRD: CPT | Mod: CPTII,S$GLB,, | Performed by: PHYSICIAN ASSISTANT

## 2021-08-12 PROCEDURE — 3075F PR MOST RECENT SYSTOLIC BLOOD PRESS GE 130-139MM HG: ICD-10-PCS | Mod: CPTII,S$GLB,, | Performed by: PHYSICIAN ASSISTANT

## 2021-08-12 PROCEDURE — 99999 PR PBB SHADOW E&M-EST. PATIENT-LVL V: CPT | Mod: PBBFAC,,, | Performed by: PHYSICIAN ASSISTANT

## 2021-08-12 PROCEDURE — 1125F PR PAIN SEVERITY QUANTIFIED, PAIN PRESENT: ICD-10-PCS | Mod: CPTII,S$GLB,, | Performed by: PHYSICIAN ASSISTANT

## 2021-08-12 PROCEDURE — 1157F ADVNC CARE PLAN IN RCRD: CPT | Mod: CPTII,S$GLB,, | Performed by: PHYSICIAN ASSISTANT

## 2021-08-12 PROCEDURE — 99214 PR OFFICE/OUTPT VISIT, EST, LEVL IV, 30-39 MIN: ICD-10-PCS | Mod: S$GLB,,, | Performed by: PHYSICIAN ASSISTANT

## 2021-08-12 PROCEDURE — 63600175 PHARM REV CODE 636 W HCPCS: Mod: JG | Performed by: PHYSICIAN ASSISTANT

## 2021-08-12 PROCEDURE — 3044F PR MOST RECENT HEMOGLOBIN A1C LEVEL <7.0%: ICD-10-PCS | Mod: CPTII,S$GLB,, | Performed by: PHYSICIAN ASSISTANT

## 2021-08-12 PROCEDURE — 1159F PR MEDICATION LIST DOCUMENTED IN MEDICAL RECORD: ICD-10-PCS | Mod: CPTII,S$GLB,, | Performed by: PHYSICIAN ASSISTANT

## 2021-08-12 PROCEDURE — 3078F PR MOST RECENT DIASTOLIC BLOOD PRESSURE < 80 MM HG: ICD-10-PCS | Mod: CPTII,S$GLB,, | Performed by: PHYSICIAN ASSISTANT

## 2021-08-12 RX ORDER — NEOMYCIN SULFATE, POLYMYXIN B SULFATE, AND DEXAMETHASONE 3.5; 10000; 1 MG/G; [USP'U]/G; MG/G
OINTMENT OPHTHALMIC 3 TIMES DAILY
COMMUNITY
Start: 2021-07-12 | End: 2022-02-07

## 2021-08-12 RX ORDER — MUPIROCIN 20 MG/G
OINTMENT TOPICAL
COMMUNITY
Start: 2021-05-27 | End: 2021-08-20

## 2021-08-12 RX ADMIN — DENOSUMAB 60 MG: 60 INJECTION SUBCUTANEOUS at 12:08

## 2021-08-19 ENCOUNTER — TELEPHONE (OUTPATIENT)
Dept: FAMILY MEDICINE | Facility: CLINIC | Age: 71
End: 2021-08-19

## 2021-08-20 ENCOUNTER — OFFICE VISIT (OUTPATIENT)
Dept: FAMILY MEDICINE | Facility: CLINIC | Age: 71
End: 2021-08-20
Payer: MEDICARE

## 2021-08-20 VITALS
BODY MASS INDEX: 41.29 KG/M2 | HEIGHT: 63 IN | WEIGHT: 233 LBS | DIASTOLIC BLOOD PRESSURE: 76 MMHG | SYSTOLIC BLOOD PRESSURE: 118 MMHG | TEMPERATURE: 99 F

## 2021-08-20 DIAGNOSIS — K21.9 GASTROESOPHAGEAL REFLUX DISEASE, UNSPECIFIED WHETHER ESOPHAGITIS PRESENT: ICD-10-CM

## 2021-08-20 DIAGNOSIS — N18.31 STAGE 3A CHRONIC KIDNEY DISEASE: ICD-10-CM

## 2021-08-20 DIAGNOSIS — I48.0 PAROXYSMAL ATRIAL FIBRILLATION: ICD-10-CM

## 2021-08-20 DIAGNOSIS — Z00.00 MEDICARE ANNUAL WELLNESS VISIT, SUBSEQUENT: Primary | ICD-10-CM

## 2021-08-20 DIAGNOSIS — D86.3 CUTANEOUS SARCOIDOSIS: ICD-10-CM

## 2021-08-20 DIAGNOSIS — G25.81 RLS (RESTLESS LEGS SYNDROME): ICD-10-CM

## 2021-08-20 DIAGNOSIS — G47.33 OSA (OBSTRUCTIVE SLEEP APNEA): ICD-10-CM

## 2021-08-20 DIAGNOSIS — M81.0 AGE-RELATED OSTEOPOROSIS WITHOUT CURRENT PATHOLOGICAL FRACTURE: ICD-10-CM

## 2021-08-20 DIAGNOSIS — J30.89 ENVIRONMENTAL AND SEASONAL ALLERGIES: ICD-10-CM

## 2021-08-20 PROCEDURE — 3008F BODY MASS INDEX DOCD: CPT | Mod: CPTII,S$GLB,, | Performed by: INTERNAL MEDICINE

## 2021-08-20 PROCEDURE — 1157F ADVNC CARE PLAN IN RCRD: CPT | Mod: CPTII,S$GLB,, | Performed by: INTERNAL MEDICINE

## 2021-08-20 PROCEDURE — 3008F PR BODY MASS INDEX (BMI) DOCUMENTED: ICD-10-PCS | Mod: CPTII,S$GLB,, | Performed by: INTERNAL MEDICINE

## 2021-08-20 PROCEDURE — 1159F PR MEDICATION LIST DOCUMENTED IN MEDICAL RECORD: ICD-10-PCS | Mod: CPTII,S$GLB,, | Performed by: INTERNAL MEDICINE

## 2021-08-20 PROCEDURE — 1126F PR PAIN SEVERITY QUANTIFIED, NO PAIN PRESENT: ICD-10-PCS | Mod: CPTII,S$GLB,, | Performed by: INTERNAL MEDICINE

## 2021-08-20 PROCEDURE — 3288F PR FALLS RISK ASSESSMENT DOCUMENTED: ICD-10-PCS | Mod: CPTII,S$GLB,, | Performed by: INTERNAL MEDICINE

## 2021-08-20 PROCEDURE — 1157F PR ADVANCE CARE PLAN OR EQUIV PRESENT IN MEDICAL RECORD: ICD-10-PCS | Mod: CPTII,S$GLB,, | Performed by: INTERNAL MEDICINE

## 2021-08-20 PROCEDURE — 1160F RVW MEDS BY RX/DR IN RCRD: CPT | Mod: CPTII,S$GLB,, | Performed by: INTERNAL MEDICINE

## 2021-08-20 PROCEDURE — 3288F FALL RISK ASSESSMENT DOCD: CPT | Mod: CPTII,S$GLB,, | Performed by: INTERNAL MEDICINE

## 2021-08-20 PROCEDURE — G0439 PR MEDICARE ANNUAL WELLNESS SUBSEQUENT VISIT: ICD-10-PCS | Mod: S$GLB,,, | Performed by: INTERNAL MEDICINE

## 2021-08-20 PROCEDURE — 3078F PR MOST RECENT DIASTOLIC BLOOD PRESSURE < 80 MM HG: ICD-10-PCS | Mod: CPTII,S$GLB,, | Performed by: INTERNAL MEDICINE

## 2021-08-20 PROCEDURE — 99999 PR PBB SHADOW E&M-EST. PATIENT-LVL V: ICD-10-PCS | Mod: PBBFAC,,, | Performed by: INTERNAL MEDICINE

## 2021-08-20 PROCEDURE — 1126F AMNT PAIN NOTED NONE PRSNT: CPT | Mod: CPTII,S$GLB,, | Performed by: INTERNAL MEDICINE

## 2021-08-20 PROCEDURE — 3074F SYST BP LT 130 MM HG: CPT | Mod: CPTII,S$GLB,, | Performed by: INTERNAL MEDICINE

## 2021-08-20 PROCEDURE — 3044F PR MOST RECENT HEMOGLOBIN A1C LEVEL <7.0%: ICD-10-PCS | Mod: CPTII,S$GLB,, | Performed by: INTERNAL MEDICINE

## 2021-08-20 PROCEDURE — 99999 PR PBB SHADOW E&M-EST. PATIENT-LVL V: CPT | Mod: PBBFAC,,, | Performed by: INTERNAL MEDICINE

## 2021-08-20 PROCEDURE — 3074F PR MOST RECENT SYSTOLIC BLOOD PRESSURE < 130 MM HG: ICD-10-PCS | Mod: CPTII,S$GLB,, | Performed by: INTERNAL MEDICINE

## 2021-08-20 PROCEDURE — 3078F DIAST BP <80 MM HG: CPT | Mod: CPTII,S$GLB,, | Performed by: INTERNAL MEDICINE

## 2021-08-20 PROCEDURE — 1101F PT FALLS ASSESS-DOCD LE1/YR: CPT | Mod: CPTII,S$GLB,, | Performed by: INTERNAL MEDICINE

## 2021-08-20 PROCEDURE — 1159F MED LIST DOCD IN RCRD: CPT | Mod: CPTII,S$GLB,, | Performed by: INTERNAL MEDICINE

## 2021-08-20 PROCEDURE — 99397 PR PREVENTIVE VISIT,EST,65 & OVER: ICD-10-PCS | Mod: S$GLB,,, | Performed by: INTERNAL MEDICINE

## 2021-08-20 PROCEDURE — 3044F HG A1C LEVEL LT 7.0%: CPT | Mod: CPTII,S$GLB,, | Performed by: INTERNAL MEDICINE

## 2021-08-20 PROCEDURE — 1160F PR REVIEW ALL MEDS BY PRESCRIBER/CLIN PHARMACIST DOCUMENTED: ICD-10-PCS | Mod: CPTII,S$GLB,, | Performed by: INTERNAL MEDICINE

## 2021-08-20 PROCEDURE — 99397 PER PM REEVAL EST PAT 65+ YR: CPT | Mod: S$GLB,,, | Performed by: INTERNAL MEDICINE

## 2021-08-20 PROCEDURE — 1101F PR PT FALLS ASSESS DOC 0-1 FALLS W/OUT INJ PAST YR: ICD-10-PCS | Mod: CPTII,S$GLB,, | Performed by: INTERNAL MEDICINE

## 2021-08-20 PROCEDURE — G0439 PPPS, SUBSEQ VISIT: HCPCS | Mod: S$GLB,,, | Performed by: INTERNAL MEDICINE

## 2021-08-20 RX ORDER — FLUNISOLIDE 0.25 MG/ML
1 SOLUTION NASAL DAILY
Qty: 25 ML | Refills: 12 | Status: SHIPPED | OUTPATIENT
Start: 2021-08-20 | End: 2021-09-28 | Stop reason: SDUPTHER

## 2021-08-20 RX ORDER — DULOXETIN HYDROCHLORIDE 30 MG/1
30 CAPSULE, DELAYED RELEASE ORAL DAILY
Qty: 90 CAPSULE | Refills: 2 | Status: SHIPPED | OUTPATIENT
Start: 2021-08-20 | End: 2022-02-07

## 2021-08-20 RX ORDER — AZELASTINE 1 MG/ML
1 SPRAY, METERED NASAL 2 TIMES DAILY
Qty: 30 ML | Refills: 12 | Status: SHIPPED | OUTPATIENT
Start: 2021-08-20 | End: 2023-05-15 | Stop reason: CLARIF

## 2021-08-24 PROBLEM — N18.31 STAGE 3A CHRONIC KIDNEY DISEASE: Status: ACTIVE | Noted: 2021-08-24

## 2021-08-24 PROBLEM — J30.89 ENVIRONMENTAL AND SEASONAL ALLERGIES: Status: ACTIVE | Noted: 2021-08-24

## 2021-08-24 PROBLEM — G25.81 RLS (RESTLESS LEGS SYNDROME): Status: ACTIVE | Noted: 2021-08-24

## 2021-08-26 ENCOUNTER — TELEPHONE (OUTPATIENT)
Dept: DERMATOLOGY | Facility: CLINIC | Age: 71
End: 2021-08-26

## 2021-08-27 ENCOUNTER — TELEPHONE (OUTPATIENT)
Dept: DERMATOLOGY | Facility: CLINIC | Age: 71
End: 2021-08-27

## 2021-09-14 ENCOUNTER — TELEPHONE (OUTPATIENT)
Dept: DERMATOLOGY | Facility: CLINIC | Age: 71
End: 2021-09-14

## 2021-09-28 ENCOUNTER — HOSPITAL ENCOUNTER (OUTPATIENT)
Dept: RADIOLOGY | Facility: HOSPITAL | Age: 71
Discharge: HOME | End: 2021-09-28
Attending: PHYSICIAN ASSISTANT
Payer: MEDICARE

## 2021-09-28 ENCOUNTER — OFFICE VISIT (OUTPATIENT)
Dept: FAMILY MEDICINE | Facility: CLINIC | Age: 71
End: 2021-09-28
Payer: MEDICARE

## 2021-09-28 VITALS
WEIGHT: 235 LBS | DIASTOLIC BLOOD PRESSURE: 72 MMHG | SYSTOLIC BLOOD PRESSURE: 128 MMHG | BODY MASS INDEX: 41.64 KG/M2 | OXYGEN SATURATION: 97 % | HEART RATE: 76 BPM | HEIGHT: 63 IN | TEMPERATURE: 98 F

## 2021-09-28 DIAGNOSIS — I10 ESSENTIAL (PRIMARY) HYPERTENSION: Chronic | ICD-10-CM

## 2021-09-28 DIAGNOSIS — M81.0 AGE-RELATED OSTEOPOROSIS WITHOUT CURRENT PATHOLOGICAL FRACTURE: ICD-10-CM

## 2021-09-28 DIAGNOSIS — R22.42 LOCALIZED SWELLING OF LEFT LOWER EXTREMITY: ICD-10-CM

## 2021-09-28 DIAGNOSIS — R09.81 NASAL CONGESTION: ICD-10-CM

## 2021-09-28 DIAGNOSIS — Z79.01 LONG TERM CURRENT USE OF ANTICOAGULANT: Chronic | ICD-10-CM

## 2021-09-28 DIAGNOSIS — I48.0 PAROXYSMAL ATRIAL FIBRILLATION: ICD-10-CM

## 2021-09-28 DIAGNOSIS — B96.89 ACUTE BACTERIAL SINUSITIS: Primary | ICD-10-CM

## 2021-09-28 DIAGNOSIS — K21.9 GASTROESOPHAGEAL REFLUX DISEASE WITHOUT ESOPHAGITIS: ICD-10-CM

## 2021-09-28 DIAGNOSIS — R05.9 COUGH: ICD-10-CM

## 2021-09-28 DIAGNOSIS — N18.31 STAGE 3A CHRONIC KIDNEY DISEASE: ICD-10-CM

## 2021-09-28 DIAGNOSIS — J01.90 ACUTE BACTERIAL SINUSITIS: Primary | ICD-10-CM

## 2021-09-28 DIAGNOSIS — Z87.442 H/O RENAL CALCULI: ICD-10-CM

## 2021-09-28 DIAGNOSIS — G47.33 OSA (OBSTRUCTIVE SLEEP APNEA): ICD-10-CM

## 2021-09-28 DIAGNOSIS — F32.A DEPRESSION, UNSPECIFIED DEPRESSION TYPE: ICD-10-CM

## 2021-09-28 DIAGNOSIS — E55.9 VITAMIN D DEFICIENCY: ICD-10-CM

## 2021-09-28 DIAGNOSIS — D68.51 FACTOR V LEIDEN: Chronic | ICD-10-CM

## 2021-09-28 PROCEDURE — 99999 PR PBB SHADOW E&M-EST. PATIENT-LVL III: ICD-10-PCS | Mod: PBBFAC,,, | Performed by: PHYSICIAN ASSISTANT

## 2021-09-28 PROCEDURE — 1159F MED LIST DOCD IN RCRD: CPT | Mod: CPTII,S$GLB,, | Performed by: PHYSICIAN ASSISTANT

## 2021-09-28 PROCEDURE — 99999 PR PBB SHADOW E&M-EST. PATIENT-LVL III: CPT | Mod: PBBFAC,,, | Performed by: PHYSICIAN ASSISTANT

## 2021-09-28 PROCEDURE — U0005 INFEC AGEN DETEC AMPLI PROBE: HCPCS | Performed by: PHYSICIAN ASSISTANT

## 2021-09-28 PROCEDURE — 3074F SYST BP LT 130 MM HG: CPT | Mod: CPTII,S$GLB,, | Performed by: PHYSICIAN ASSISTANT

## 2021-09-28 PROCEDURE — 3078F PR MOST RECENT DIASTOLIC BLOOD PRESSURE < 80 MM HG: ICD-10-PCS | Mod: CPTII,S$GLB,, | Performed by: PHYSICIAN ASSISTANT

## 2021-09-28 PROCEDURE — 3288F FALL RISK ASSESSMENT DOCD: CPT | Mod: CPTII,S$GLB,, | Performed by: PHYSICIAN ASSISTANT

## 2021-09-28 PROCEDURE — 3008F BODY MASS INDEX DOCD: CPT | Mod: CPTII,S$GLB,, | Performed by: PHYSICIAN ASSISTANT

## 2021-09-28 PROCEDURE — 3008F PR BODY MASS INDEX (BMI) DOCUMENTED: ICD-10-PCS | Mod: CPTII,S$GLB,, | Performed by: PHYSICIAN ASSISTANT

## 2021-09-28 PROCEDURE — 3288F PR FALLS RISK ASSESSMENT DOCUMENTED: ICD-10-PCS | Mod: CPTII,S$GLB,, | Performed by: PHYSICIAN ASSISTANT

## 2021-09-28 PROCEDURE — 3078F DIAST BP <80 MM HG: CPT | Mod: CPTII,S$GLB,, | Performed by: PHYSICIAN ASSISTANT

## 2021-09-28 PROCEDURE — 1160F PR REVIEW ALL MEDS BY PRESCRIBER/CLIN PHARMACIST DOCUMENTED: ICD-10-PCS | Mod: CPTII,S$GLB,, | Performed by: PHYSICIAN ASSISTANT

## 2021-09-28 PROCEDURE — 3074F PR MOST RECENT SYSTOLIC BLOOD PRESSURE < 130 MM HG: ICD-10-PCS | Mod: CPTII,S$GLB,, | Performed by: PHYSICIAN ASSISTANT

## 2021-09-28 PROCEDURE — 3044F HG A1C LEVEL LT 7.0%: CPT | Mod: CPTII,S$GLB,, | Performed by: PHYSICIAN ASSISTANT

## 2021-09-28 PROCEDURE — 1101F PT FALLS ASSESS-DOCD LE1/YR: CPT | Mod: CPTII,S$GLB,, | Performed by: PHYSICIAN ASSISTANT

## 2021-09-28 PROCEDURE — 1159F PR MEDICATION LIST DOCUMENTED IN MEDICAL RECORD: ICD-10-PCS | Mod: CPTII,S$GLB,, | Performed by: PHYSICIAN ASSISTANT

## 2021-09-28 PROCEDURE — 93971 EXTREMITY STUDY: CPT | Mod: 26,LT,, | Performed by: RADIOLOGY

## 2021-09-28 PROCEDURE — 1157F PR ADVANCE CARE PLAN OR EQUIV PRESENT IN MEDICAL RECORD: ICD-10-PCS | Mod: CPTII,S$GLB,, | Performed by: PHYSICIAN ASSISTANT

## 2021-09-28 PROCEDURE — U0003 INFECTIOUS AGENT DETECTION BY NUCLEIC ACID (DNA OR RNA); SEVERE ACUTE RESPIRATORY SYNDROME CORONAVIRUS 2 (SARS-COV-2) (CORONAVIRUS DISEASE [COVID-19]), AMPLIFIED PROBE TECHNIQUE, MAKING USE OF HIGH THROUGHPUT TECHNOLOGIES AS DESCRIBED BY CMS-2020-01-R: HCPCS | Performed by: PHYSICIAN ASSISTANT

## 2021-09-28 PROCEDURE — 1160F RVW MEDS BY RX/DR IN RCRD: CPT | Mod: CPTII,S$GLB,, | Performed by: PHYSICIAN ASSISTANT

## 2021-09-28 PROCEDURE — 1157F ADVNC CARE PLAN IN RCRD: CPT | Mod: CPTII,S$GLB,, | Performed by: PHYSICIAN ASSISTANT

## 2021-09-28 PROCEDURE — 99214 PR OFFICE/OUTPT VISIT, EST, LEVL IV, 30-39 MIN: ICD-10-PCS | Mod: S$GLB,,, | Performed by: PHYSICIAN ASSISTANT

## 2021-09-28 PROCEDURE — 93971 US LOWER EXTREMITY VEINS LEFT: ICD-10-PCS | Mod: 26,LT,, | Performed by: RADIOLOGY

## 2021-09-28 PROCEDURE — 99214 OFFICE O/P EST MOD 30 MIN: CPT | Mod: S$GLB,,, | Performed by: PHYSICIAN ASSISTANT

## 2021-09-28 PROCEDURE — 3044F PR MOST RECENT HEMOGLOBIN A1C LEVEL <7.0%: ICD-10-PCS | Mod: CPTII,S$GLB,, | Performed by: PHYSICIAN ASSISTANT

## 2021-09-28 PROCEDURE — 93971 EXTREMITY STUDY: CPT | Mod: TC,PO,LT

## 2021-09-28 PROCEDURE — 1101F PR PT FALLS ASSESS DOC 0-1 FALLS W/OUT INJ PAST YR: ICD-10-PCS | Mod: CPTII,S$GLB,, | Performed by: PHYSICIAN ASSISTANT

## 2021-09-28 RX ORDER — LEVOCETIRIZINE DIHYDROCHLORIDE 5 MG/1
5 TABLET, FILM COATED ORAL NIGHTLY
Qty: 30 TABLET | Refills: 11 | Status: SHIPPED | OUTPATIENT
Start: 2021-09-28 | End: 2022-05-18 | Stop reason: SDUPTHER

## 2021-09-28 RX ORDER — FLUNISOLIDE 0.25 MG/ML
1 SOLUTION NASAL DAILY
Qty: 25 ML | Refills: 12 | Status: SHIPPED | OUTPATIENT
Start: 2021-09-28 | End: 2022-08-10 | Stop reason: SDUPTHER

## 2021-09-28 RX ORDER — ALBUTEROL SULFATE 90 UG/1
2 AEROSOL, METERED RESPIRATORY (INHALATION) EVERY 4 HOURS PRN
Qty: 18 G | Refills: 0 | Status: SHIPPED | OUTPATIENT
Start: 2021-09-28 | End: 2022-02-18 | Stop reason: SDUPTHER

## 2021-09-28 RX ORDER — DOXYCYCLINE 100 MG/1
100 CAPSULE ORAL EVERY 12 HOURS
Qty: 14 CAPSULE | Refills: 0 | Status: SHIPPED | OUTPATIENT
Start: 2021-09-28 | End: 2021-10-05

## 2021-09-29 LAB
SARS-COV-2 RNA RESP QL NAA+PROBE: NOT DETECTED
SARS-COV-2- CYCLE NUMBER: NORMAL

## 2021-10-01 ENCOUNTER — IMMUNIZATION (OUTPATIENT)
Dept: FAMILY MEDICINE | Facility: CLINIC | Age: 71
End: 2021-10-01
Payer: MEDICARE

## 2021-10-01 DIAGNOSIS — Z23 NEED FOR VACCINATION: Primary | ICD-10-CM

## 2021-10-01 PROCEDURE — 91300 COVID-19, MRNA, LNP-S, PF, 30 MCG/0.3 ML DOSE VACCINE: CPT | Mod: PBBFAC | Performed by: INTERNAL MEDICINE

## 2021-10-01 PROCEDURE — 0003A COVID-19, MRNA, LNP-S, PF, 30 MCG/0.3 ML DOSE VACCINE: CPT | Mod: PBBFAC | Performed by: INTERNAL MEDICINE

## 2021-11-29 ENCOUNTER — PATIENT MESSAGE (OUTPATIENT)
Dept: RHEUMATOLOGY | Facility: CLINIC | Age: 71
End: 2021-11-29
Payer: MEDICARE

## 2021-11-29 ENCOUNTER — TELEPHONE (OUTPATIENT)
Dept: RHEUMATOLOGY | Facility: CLINIC | Age: 71
End: 2021-11-29
Payer: MEDICARE

## 2021-12-22 ENCOUNTER — TELEPHONE (OUTPATIENT)
Dept: FAMILY MEDICINE | Facility: CLINIC | Age: 71
End: 2021-12-22
Payer: MEDICARE

## 2022-01-10 ENCOUNTER — LAB VISIT (OUTPATIENT)
Dept: PRIMARY CARE CLINIC | Facility: OTHER | Age: 72
End: 2022-01-10
Payer: MEDICARE

## 2022-01-10 DIAGNOSIS — Z20.822 ENCOUNTER FOR LABORATORY TESTING FOR COVID-19 VIRUS: ICD-10-CM

## 2022-01-10 PROCEDURE — U0003 INFECTIOUS AGENT DETECTION BY NUCLEIC ACID (DNA OR RNA); SEVERE ACUTE RESPIRATORY SYNDROME CORONAVIRUS 2 (SARS-COV-2) (CORONAVIRUS DISEASE [COVID-19]), AMPLIFIED PROBE TECHNIQUE, MAKING USE OF HIGH THROUGHPUT TECHNOLOGIES AS DESCRIBED BY CMS-2020-01-R: HCPCS | Performed by: FAMILY MEDICINE

## 2022-01-11 ENCOUNTER — NURSE TRIAGE (OUTPATIENT)
Dept: ADMINISTRATIVE | Facility: CLINIC | Age: 72
End: 2022-01-11
Payer: MEDICARE

## 2022-01-11 LAB
SARS-COV-2 RNA RESP QL NAA+PROBE: NOT DETECTED
SARS-COV-2- CYCLE NUMBER: NORMAL

## 2022-01-12 ENCOUNTER — NURSE TRIAGE (OUTPATIENT)
Dept: ADMINISTRATIVE | Facility: CLINIC | Age: 72
End: 2022-01-12
Payer: MEDICARE

## 2022-01-12 NOTE — TELEPHONE ENCOUNTER
Please call pt at [phone]  Pt stated she received her covid results via my chart portal and she is confused. Pt stated one test stated she was positive for covid and the other one said negative. Please call and advise pt.   Reason for Disposition   Lab result questions   [1] Follow-up call from patient regarding patient's clinical status AND [2] information NON-URGENT    Protocols used: INFORMATION ONLY CALL-A-AH, PCP CALL - NO TRIAGE-A-AH

## 2022-01-12 NOTE — TELEPHONE ENCOUNTER
Attempted to contact pt. No answer, left message for pt to call clinic back.     Portal message sent to patient notifying that all results are negative.

## 2022-01-12 NOTE — TELEPHONE ENCOUNTER
OOC pt spoke with triage RN.  Pt wanted to know if COVID-19 test results were back.  Pt advised PCR tests take at least 48 hours and to contact PCP for test results. Pt verbalized understanding.    Reason for Disposition   Lab result questions   Caller requesting lab results    Additional Information   Negative: [1] Caller is not with the adult (patient) AND [2] reporting urgent symptoms   Negative: Lab calling with strep throat test results and triager can call in prescription   Negative: Lab calling with urinalysis test results and triager can call in prescription   Negative: Medication questions   Negative: ED call to PCP   Negative: Physician call to PCP   Negative: Call about patient who is currently hospitalized   Negative: Lab or radiology calling with CRITICAL test results   Negative: [1] Prescription not at pharmacy AND [2] was prescribed today by PCP   Negative: [1] Follow-up call from patient regarding patient's clinical status AND [2] information urgent   Negative: [1] Caller requests to speak ONLY to PCP AND [2] URGENT question   Negative: [1] Caller requests to speak to PCP now AND [2] won't tell us reason for call  (Exception: if 10 pm to 6 am, caller must first discuss reason for the call)   Negative: Notification of hospital admission   Negative: Notification of death    Protocols used: INFORMATION ONLY CALL - NO TRIAGE-A-AH, PCP CALL - NO TRIAGE-A-AH

## 2022-02-07 ENCOUNTER — OFFICE VISIT (OUTPATIENT)
Dept: FAMILY MEDICINE | Facility: CLINIC | Age: 72
End: 2022-02-07
Payer: MEDICARE

## 2022-02-07 VITALS
TEMPERATURE: 99 F | SYSTOLIC BLOOD PRESSURE: 116 MMHG | HEIGHT: 60 IN | OXYGEN SATURATION: 99 % | RESPIRATION RATE: 17 BRPM | BODY MASS INDEX: 45.06 KG/M2 | WEIGHT: 229.5 LBS | HEART RATE: 79 BPM | DIASTOLIC BLOOD PRESSURE: 88 MMHG

## 2022-02-07 DIAGNOSIS — I48.0 PAROXYSMAL ATRIAL FIBRILLATION: ICD-10-CM

## 2022-02-07 DIAGNOSIS — D84.9 DEFICIENCY SYNDROME, IMMUNOLOGIC: ICD-10-CM

## 2022-02-07 DIAGNOSIS — E27.40 ADRENAL INSUFFICIENCY: ICD-10-CM

## 2022-02-07 DIAGNOSIS — N18.31 STAGE 3A CHRONIC KIDNEY DISEASE: ICD-10-CM

## 2022-02-07 DIAGNOSIS — R42 DIZZINESS: Primary | ICD-10-CM

## 2022-02-07 DIAGNOSIS — G89.4 CHRONIC PAIN DISORDER: ICD-10-CM

## 2022-02-07 DIAGNOSIS — M81.0 OSTEOPOROSIS, POSTMENOPAUSAL: Primary | ICD-10-CM

## 2022-02-07 DIAGNOSIS — D68.51 FACTOR V LEIDEN: ICD-10-CM

## 2022-02-07 DIAGNOSIS — I10 ESSENTIAL (PRIMARY) HYPERTENSION: ICD-10-CM

## 2022-02-07 DIAGNOSIS — G25.81 RLS (RESTLESS LEGS SYNDROME): ICD-10-CM

## 2022-02-07 DIAGNOSIS — D64.9 ANEMIA, UNSPECIFIED TYPE: ICD-10-CM

## 2022-02-07 PROCEDURE — 3074F PR MOST RECENT SYSTOLIC BLOOD PRESSURE < 130 MM HG: ICD-10-PCS | Mod: CPTII,S$GLB,, | Performed by: INTERNAL MEDICINE

## 2022-02-07 PROCEDURE — 1157F PR ADVANCE CARE PLAN OR EQUIV PRESENT IN MEDICAL RECORD: ICD-10-PCS | Mod: CPTII,S$GLB,, | Performed by: INTERNAL MEDICINE

## 2022-02-07 PROCEDURE — 1160F RVW MEDS BY RX/DR IN RCRD: CPT | Mod: CPTII,S$GLB,, | Performed by: INTERNAL MEDICINE

## 2022-02-07 PROCEDURE — 99999 PR PBB SHADOW E&M-EST. PATIENT-LVL III: CPT | Mod: PBBFAC,,, | Performed by: INTERNAL MEDICINE

## 2022-02-07 PROCEDURE — 1125F PR PAIN SEVERITY QUANTIFIED, PAIN PRESENT: ICD-10-PCS | Mod: CPTII,S$GLB,, | Performed by: INTERNAL MEDICINE

## 2022-02-07 PROCEDURE — 1100F PTFALLS ASSESS-DOCD GE2>/YR: CPT | Mod: CPTII,S$GLB,, | Performed by: INTERNAL MEDICINE

## 2022-02-07 PROCEDURE — 99999 PR PBB SHADOW E&M-EST. PATIENT-LVL III: ICD-10-PCS | Mod: PBBFAC,,, | Performed by: INTERNAL MEDICINE

## 2022-02-07 PROCEDURE — 3288F FALL RISK ASSESSMENT DOCD: CPT | Mod: CPTII,S$GLB,, | Performed by: INTERNAL MEDICINE

## 2022-02-07 PROCEDURE — 1157F ADVNC CARE PLAN IN RCRD: CPT | Mod: CPTII,S$GLB,, | Performed by: INTERNAL MEDICINE

## 2022-02-07 PROCEDURE — 1160F PR REVIEW ALL MEDS BY PRESCRIBER/CLIN PHARMACIST DOCUMENTED: ICD-10-PCS | Mod: CPTII,S$GLB,, | Performed by: INTERNAL MEDICINE

## 2022-02-07 PROCEDURE — 99214 OFFICE O/P EST MOD 30 MIN: CPT | Mod: S$GLB,,, | Performed by: INTERNAL MEDICINE

## 2022-02-07 PROCEDURE — 3074F SYST BP LT 130 MM HG: CPT | Mod: CPTII,S$GLB,, | Performed by: INTERNAL MEDICINE

## 2022-02-07 PROCEDURE — 1100F PR PT FALLS ASSESS DOC 2+ FALLS/FALL W/INJURY/YR: ICD-10-PCS | Mod: CPTII,S$GLB,, | Performed by: INTERNAL MEDICINE

## 2022-02-07 PROCEDURE — 1159F MED LIST DOCD IN RCRD: CPT | Mod: CPTII,S$GLB,, | Performed by: INTERNAL MEDICINE

## 2022-02-07 PROCEDURE — 1125F AMNT PAIN NOTED PAIN PRSNT: CPT | Mod: CPTII,S$GLB,, | Performed by: INTERNAL MEDICINE

## 2022-02-07 PROCEDURE — 3288F PR FALLS RISK ASSESSMENT DOCUMENTED: ICD-10-PCS | Mod: CPTII,S$GLB,, | Performed by: INTERNAL MEDICINE

## 2022-02-07 PROCEDURE — 3079F PR MOST RECENT DIASTOLIC BLOOD PRESSURE 80-89 MM HG: ICD-10-PCS | Mod: CPTII,S$GLB,, | Performed by: INTERNAL MEDICINE

## 2022-02-07 PROCEDURE — 3008F BODY MASS INDEX DOCD: CPT | Mod: CPTII,S$GLB,, | Performed by: INTERNAL MEDICINE

## 2022-02-07 PROCEDURE — 3008F PR BODY MASS INDEX (BMI) DOCUMENTED: ICD-10-PCS | Mod: CPTII,S$GLB,, | Performed by: INTERNAL MEDICINE

## 2022-02-07 PROCEDURE — 99214 PR OFFICE/OUTPT VISIT, EST, LEVL IV, 30-39 MIN: ICD-10-PCS | Mod: S$GLB,,, | Performed by: INTERNAL MEDICINE

## 2022-02-07 PROCEDURE — 1159F PR MEDICATION LIST DOCUMENTED IN MEDICAL RECORD: ICD-10-PCS | Mod: CPTII,S$GLB,, | Performed by: INTERNAL MEDICINE

## 2022-02-07 PROCEDURE — 3079F DIAST BP 80-89 MM HG: CPT | Mod: CPTII,S$GLB,, | Performed by: INTERNAL MEDICINE

## 2022-02-07 RX ORDER — DIPHENOXYLATE HYDROCHLORIDE AND ATROPINE SULFATE 2.5; .025 MG/1; MG/1
1 TABLET ORAL 4 TIMES DAILY PRN
Qty: 15 TABLET | Refills: 0 | Status: SHIPPED | OUTPATIENT
Start: 2022-02-07 | End: 2022-05-31

## 2022-02-07 RX ORDER — NITROFURANTOIN 25; 75 MG/1; MG/1
100 CAPSULE ORAL NIGHTLY
COMMUNITY
Start: 2021-08-24 | End: 2022-08-25

## 2022-02-07 RX ORDER — ROPINIROLE 5 MG/1
5 TABLET, FILM COATED ORAL NIGHTLY
Qty: 90 TABLET | Refills: 2 | Status: SHIPPED | OUTPATIENT
Start: 2022-02-07 | End: 2023-05-25

## 2022-02-07 RX ORDER — DULOXETIN HYDROCHLORIDE 60 MG/1
60 CAPSULE, DELAYED RELEASE ORAL DAILY
Qty: 90 CAPSULE | Refills: 2 | Status: SHIPPED | OUTPATIENT
Start: 2022-02-07 | End: 2022-09-13

## 2022-02-07 NOTE — PROGRESS NOTES
Subjective:    Get old note     Wellness visit was done last year August 2021 not due yet.     Patient ID: Cori Avalos is a 71 y.o. female.    Chief Complaint: Follow-up  Gyn: Dr Hunt 2022  MMG:  10/21  Dexa: 2019-osteopenia -1.5 & -1.9  w fragility fracture. Tx:  Prolia mgmt in [place] at rheum MD  Colonoscopy:   2/25/19 adenoma r/c 3 yr Dr Jeff   Immunizations: Flu: Tdap: [facility] 2021 cat bite Pneumovax: 2014 Prevnar 13: 2009 Shingles:old  Covid: yes   Smoker:  Never   Eye: [place]       HPI     Recently UTI at Gyn - culture Ecoli - changed macrobid   Having some dizziness -laying back in bed does make it worse.  Sometimes positional.  Has not seen ENT for her vertigo workup.    Chronic arthralgias:  Gettting Tinciture twice daily recommend from Dr Bocanegra //helping w pain- shoulder and knee pain     HTN: controlled Rx metoprolol 50,  A fib: + loop recorder// Rx; Eliquis.& Metoprolol  /Cardio: Dr Colin   CKD stage 3:  GFR 38- drop recently -recommend increase hydration   Anemia:  Hemoglobin 11.8 //check iron profile does have CKD.    RLS:  Controlled Rx Requip 5  Back DDD; Dr Bocanegra injections //Dr Flanagan : has pain stimulator. // uses 10 mg Valium nightly as a muscle relaxer from pain management  Depression: uncontrolled - review of chart getting 30 mg should be 60 mg //  Helps w chronic pain. /  Elavil 10 nightly added by pain mgmt for depression ?   Hx Cutaneous sarcoid: c/o hard nodules under skin- some questions if truly dx.   Allergies:  Controlled Rx astelin, Flunisolide nasal, H1   KENROY; CPAP old machine was recalled   GERD: controlled Rx Nexium 40     Review of Systems:  Review of Systems   Constitutional: Negative for appetite change.   HENT: Negative for nosebleeds.    Eyes: Negative for pain.   Respiratory: Negative for choking.    Cardiovascular: Positive for palpitations. Negative for chest pain.   Gastrointestinal: Negative for blood in stool.   Genitourinary: Negative for hematuria.    Musculoskeletal: Negative for joint swelling.   Skin: Negative for pallor.   Neurological: Negative for facial asymmetry.   Hematological: Does not bruise/bleed easily.   Psychiatric/Behavioral: Positive for dysphoric mood. Negative for confusion.       Objective:     Vitals:    02/07/22 1502   BP: 116/88   BP Location: Right arm   Patient Position: Sitting   BP Method: Large (Manual)   Pulse: 79   Resp: 17   Temp: 98.9 °F (37.2 °C)   SpO2: 99%   Weight: 104.1 kg (229 lb 8 oz)   Height: 5' (1.524 m)          Physical Exam  Vitals reviewed.   Constitutional:       Appearance: Normal appearance. She is obese.      Comments: Walks w walker    HENT:      Head: Normocephalic and atraumatic.      Mouth/Throat:      Pharynx: Oropharynx is clear.   Eyes:      Extraocular Movements: Extraocular movements intact.      Conjunctiva/sclera: Conjunctivae normal.      Pupils: Pupils are equal, round, and reactive to light.   Cardiovascular:      Rate and Rhythm: Normal rate and regular rhythm.      Heart sounds: Normal heart sounds.   Pulmonary:      Effort: Pulmonary effort is normal.      Breath sounds: Normal breath sounds.   Chest:          Comments: Loop recorder  Abdominal:      General: Bowel sounds are normal.      Palpations: Abdomen is soft.   Musculoskeletal:         General: Normal range of motion.      Cervical back: Normal range of motion and neck supple.   Skin:     General: Skin is warm and dry.   Neurological:      General: No focal deficit present.      Mental Status: She is alert and oriented to person, place, and time.   Psychiatric:         Mood and Affect: Mood normal.         Medication List with Changes/Refills   Current Medications    ALBUTEROL (PROVENTIL) 2.5 MG /3 ML (0.083 %) NEBULIZER SOLUTION        ALBUTEROL (PROVENTIL/VENTOLIN HFA) 90 MCG/ACTUATION INHALER    Inhale 2 puffs into the lungs every 4 (four) hours as needed for Wheezing or Shortness of Breath.    AMITRIPTYLINE (ELAVIL) 10 MG TABLET     Take 10 mg by mouth every evening.     APIXABAN (ELIQUIS) 2.5 MG TAB    Take 2.5 mg by mouth 2 (two) times daily.     AZELASTINE (ASTELIN) 137 MCG (0.1 %) NASAL SPRAY    1 spray (137 mcg total) by Nasal route 2 (two) times daily.    BENZONATATE (TESSALON) 100 MG CAPSULE    Take 100 mg by mouth 3 (three) times daily as needed.     CALCIUM 500 500 MG CALCIUM (1,250 MG) TABLET    Take 1 tablet by mouth once daily.    CANNABIDIOL (EPIDIOLEX) 100 MG/ML    Take 300 mg by mouth 2 (two) times daily.    CHOLECALCIFEROL, VITAMIN D3, (VITAMIN D3) 25 MCG (1,000 UNIT) CAPSULE    Take 1,000 Units by mouth once daily.    DENOSUMAB (PROLIA) 60 MG/ML SYRG    Inject 60 mg into the skin every 6 (six) months.     DIAZEPAM (VALIUM) 10 MG TAB    Take 10 mg by mouth every evening.     DIPHENHYDRAMINE (BENADRYL) 25 MG CAPSULE    Take 25 mg by mouth every 6 (six) hours as needed.    ESOMEPRAZOLE (NEXIUM) 40 MG CAPSULE    TAKE 1 CAPSULE BY MOUTH ONCE DAILY.    FLUNISOLIDE 25 MCG, 0.025%, (NASALIDE) 25 MCG (0.025 %) SPRY    1 spray by Nasal route once daily.    FUROSEMIDE (LASIX) 40 MG TABLET    Take 20 mg by mouth every other day.     GUAIFENESIN (MUCINEX) 600 MG 12 HR TABLET    Take 1,200 mg by mouth 2 (two) times daily as needed.     IBUPROFEN (ADVIL,MOTRIN) 800 MG TABLET    Take 800 mg by mouth every 6 (six) hours as needed.     LEVOCETIRIZINE (XYZAL) 5 MG TABLET    Take 1 tablet (5 mg total) by mouth every evening.    METOPROLOL TARTRATE (LOPRESSOR) 50 MG TABLET    Take 75 mg by mouth 2 (two) times daily. Pt taking 1 & a half tablets twice a day.    NITROFURANTOIN, MACROCRYSTAL-MONOHYDRATE, (MACROBID) 100 MG CAPSULE    Take 100 mg by mouth nightly.    ONDANSETRON (ZOFRAN-ODT) 4 MG TBDL    Take 1 tablet (4 mg total) by mouth every 8 (eight) hours as needed.    POTASSIUM CHLORIDE (KLOR-CON) 10 MEQ TBSR    Take 10 mEq by mouth once daily.    TIZANIDINE (ZANAFLEX) 4 MG TABLET    Take 4 mg by mouth every 8 (eight) hours.     TRAMADOL (ULTRAM)  50 MG TABLET    tramadol 50 mg tablet   Take 1 tablet every 6 hours by oral route.   Changed and/or Refilled Medications    Modified Medication Previous Medication    DIPHENOXYLATE-ATROPINE 2.5-0.025 MG (LOMOTIL) 2.5-0.025 MG PER TABLET diphenoxylate-atropine 2.5-0.025 mg (LOMOTIL) 2.5-0.025 mg per tablet       Take 1 tablet by mouth 4 (four) times daily as needed. for diarrhea    TAKE 1 TABLET BY MOUTH FOUR TIMES DAILY AS NEEDED FOR DIARRHEA    DULOXETINE (CYMBALTA) 60 MG CAPSULE duloxetine (CYMBALTA) 60 MG capsule       Take 1 capsule (60 mg total) by mouth once daily.    Take 60 mg by mouth once daily.    ROPINIROLE (REQUIP) 5 MG TABLET rOPINIRole (REQUIP) 5 MG tablet       Take 1 tablet (5 mg total) by mouth nightly.    Take 5 mg by mouth nightly.    Discontinued Medications    DULOXETINE (CYMBALTA) 30 MG CAPSULE    Take 1 capsule (30 mg total) by mouth once daily.    NEOMYCIN-POLYMYXIN-DEXAMETHASONE (DEXACINE) 3.5 MG/G-10,000 UNIT/G-0.1 % OINT    Place into the left eye 3 (three) times daily.       Assessment & Plan:  1. Dizziness    2. Anemia, unspecified type  - CBC Auto Differential; Future  - Iron and TIBC; Future    3. Stage 3a chronic kidney disease    4. RLS (restless legs syndrome)    5. Essential (primary) hypertension    6. Paroxysmal atrial fibrillation    7. BMI 40.0-44.9, adult    8. Adrenal insufficiency    9. Deficiency syndrome, immunologic    10. Factor V Leiden    11. Chronic pain disorder     Dizziness    Anemia, unspecified type  -     CBC Auto Differential; Future; Expected date: 02/07/2022  -     Iron and TIBC; Future; Expected date: 02/07/2022    Stage 3a chronic kidney disease    RLS (restless legs syndrome)    Essential (primary) hypertension    Paroxysmal atrial fibrillation    BMI 40.0-44.9, adult    Adrenal insufficiency    Deficiency syndrome, immunologic    Factor V Leiden    Chronic pain disorder    Other orders  -     DULoxetine (CYMBALTA) 60 MG capsule; Take 1 capsule (60 mg  total) by mouth once daily.  Dispense: 90 capsule; Refill: 2  -     rOPINIRole (REQUIP) 5 MG tablet; Take 1 tablet (5 mg total) by mouth nightly.  Dispense: 90 tablet; Refill: 2  -     diphenoxylate-atropine 2.5-0.025 mg (LOMOTIL) 2.5-0.025 mg per tablet; Take 1 tablet by mouth 4 (four) times daily as needed. for diarrhea  Dispense: 15 tablet; Refill: 0        Continue to work on regular exercise, maintain healthy weight, balanced diet. Avoid unhealthy habits: smoking, excessive alcohol intake.

## 2022-02-11 ENCOUNTER — TELEPHONE (OUTPATIENT)
Dept: RHEUMATOLOGY | Facility: CLINIC | Age: 72
End: 2022-02-11
Payer: MEDICARE

## 2022-02-14 ENCOUNTER — TELEPHONE (OUTPATIENT)
Dept: RHEUMATOLOGY | Facility: CLINIC | Age: 72
End: 2022-02-14
Payer: MEDICARE

## 2022-02-14 ENCOUNTER — PATIENT OUTREACH (OUTPATIENT)
Dept: ADMINISTRATIVE | Facility: OTHER | Age: 72
End: 2022-02-14
Payer: MEDICARE

## 2022-02-14 NOTE — TELEPHONE ENCOUNTER
----- Message from Dorina Arriaga RN sent at 2/13/2022  4:52 PM CST -----  Regarding: prolia  Please sign therapy plan, thanks

## 2022-02-25 ENCOUNTER — TELEPHONE (OUTPATIENT)
Dept: RHEUMATOLOGY | Facility: CLINIC | Age: 72
End: 2022-02-25
Payer: MEDICARE

## 2022-02-28 ENCOUNTER — TELEPHONE (OUTPATIENT)
Dept: RHEUMATOLOGY | Facility: CLINIC | Age: 72
End: 2022-02-28
Payer: MEDICARE

## 2022-02-28 ENCOUNTER — PATIENT MESSAGE (OUTPATIENT)
Dept: RHEUMATOLOGY | Facility: CLINIC | Age: 72
End: 2022-02-28
Payer: MEDICARE

## 2022-02-28 DIAGNOSIS — M81.0 OSTEOPOROSIS, POSTMENOPAUSAL: Primary | ICD-10-CM

## 2022-02-28 NOTE — TELEPHONE ENCOUNTER
"Returned patients phone call. Patient stated that she was hospitalized 2 weeks ago with severe sleep apnea. She stated that it was so bad that it took them a while to wake her back up. She is very afraid to drive all the way here to [place] with her  and her pass out and her  not be able to wake her up. She would like to know if our [place] office offers the prolia injection since that is closer to her home. Advised patient to contact the [place] office and let know what is going on and that she would like to transfer her care over there and to see if they offer the Prolia injection. Gave patient well wishes. Patient verbalized understanding. All questions answered.       Brandy Hurt MA ([place])  Rheumatology Department   "

## 2022-02-28 NOTE — TELEPHONE ENCOUNTER
----- Message from Sara Dickey sent at 2/28/2022  6:41 AM CST -----  Contact: pt  The pt request a return call concerning todays appt, no additional info given and can be reached at [phone]///[id number]      Pt is requesting to r/s her 02/28 appts in [place]. Pt states she has severe sleep apnea and is afraid to drive to  for the appts. Please call an advise.

## 2022-03-16 ENCOUNTER — TELEPHONE (OUTPATIENT)
Dept: RHEUMATOLOGY | Facility: CLINIC | Age: 72
End: 2022-03-16
Payer: MEDICARE

## 2022-03-16 ENCOUNTER — LAB VISIT (OUTPATIENT)
Dept: LAB | Facility: HOSPITAL | Age: 72
End: 2022-03-16
Attending: PHYSICIAN ASSISTANT
Payer: MEDICARE

## 2022-03-16 DIAGNOSIS — M81.0 OSTEOPOROSIS, POSTMENOPAUSAL: ICD-10-CM

## 2022-03-16 LAB
ALBUMIN SERPL BCP-MCNC: 3.6 G/DL (ref 3.5–5.2)
ALP SERPL-CCNC: 107 U/L (ref 55–135)
ALT SERPL W/O P-5'-P-CCNC: 16 U/L (ref 10–44)
ANION GAP SERPL CALC-SCNC: 8 MMOL/L (ref 8–16)
AST SERPL-CCNC: 17 U/L (ref 10–40)
BILIRUB SERPL-MCNC: 0.5 MG/DL (ref 0.1–1)
BUN SERPL-MCNC: 27 MG/DL (ref 8–23)
CALCIUM SERPL-MCNC: 9.6 MG/DL (ref 8.7–10.5)
CHLORIDE SERPL-SCNC: 107 MMOL/L (ref 95–110)
CO2 SERPL-SCNC: 27 MMOL/L (ref 23–29)
CREAT SERPL-MCNC: 1 MG/DL (ref 0.5–1.4)
EST. GFR  (AFRICAN AMERICAN): >60 ML/MIN/1.73 M^2
EST. GFR  (NON AFRICAN AMERICAN): 56 ML/MIN/1.73 M^2
GLUCOSE SERPL-MCNC: 111 MG/DL (ref 70–110)
POTASSIUM SERPL-SCNC: 4.1 MMOL/L (ref 3.5–5.1)
PROT SERPL-MCNC: 7 G/DL (ref 6–8.4)
SODIUM SERPL-SCNC: 142 MMOL/L (ref 136–145)

## 2022-03-16 PROCEDURE — 80053 COMPREHEN METABOLIC PANEL: CPT | Performed by: PHYSICIAN ASSISTANT

## 2022-03-16 PROCEDURE — 36415 COLL VENOUS BLD VENIPUNCTURE: CPT | Performed by: PHYSICIAN ASSISTANT

## 2022-03-16 NOTE — TELEPHONE ENCOUNTER
See previous message  ----- Message from Janice Matias sent at 3/15/2022  2:13 PM CDT -----  Contact: patient  Type: Needs Medical Advice  Who Called:  patient   Best Call Back Number: [phone] (home)  Additional Information: patient gets a prolia shot, bone scan, and labs every 6 months in rheumatology, her provider in [place] retired and she would like to move closer as she lives in [facility], she has an appt tomorrow, so she would not need an appt until Sept. Would she be able to get in with any of the [place] providers in September and start seeing them regularly every 6 months for this? Please advise.

## 2022-03-16 NOTE — TELEPHONE ENCOUNTER
----- Message from Vida Mccall sent at 3/16/2022  9:41 AM CDT -----  Type: Needs Medical Advice  Who Called:  Pt  Symptoms (please be specific):  Pt currently see Dr. Mcneil at [facility]. She stated she spoke to someone in [place] that told her she could be transferred to [place] for all her care. She is asking change the location of her current appts to [place] if possible. I called over and spoke to Dr. Bernal nurse and they said they would  be ok with her changing locations.     Best Call Back Number: [phone]  Additional Information: Please call pt and schedule if possible.

## 2022-03-22 ENCOUNTER — TELEPHONE (OUTPATIENT)
Dept: RHEUMATOLOGY | Facility: CLINIC | Age: 72
End: 2022-03-22
Payer: MEDICARE

## 2022-03-22 NOTE — TELEPHONE ENCOUNTER
Spoke with pt and scheduled dexa for 3.30.22 at 1.30 pm, visit with Brandy Bernal PA-C for 2 pm and prolia at 2.45 pm at [place]. Pt verbalized understanding

## 2022-03-22 NOTE — TELEPHONE ENCOUNTER
----- Message from Dhara Cordon sent at 3/22/2022 12:32 PM CDT -----  Regarding: Prolia  Contact: patient  Patient needs to speak to someone regarding her Prolia and bone scan, please call hr back at [phone]

## 2022-03-24 ENCOUNTER — TELEPHONE (OUTPATIENT)
Dept: RHEUMATOLOGY | Facility: CLINIC | Age: 72
End: 2022-03-24
Payer: MEDICARE

## 2022-03-24 NOTE — TELEPHONE ENCOUNTER
Patient has an appointment scheduled  ----- Message from Mariano Herrera sent at 3/22/2022 10:28 AM CDT -----  Contact: Pt  Type: Needs Medical Advice    Who Called:Pt  Best Call Back Number:[phone]    Additional Information Requesting a call back regarding Pt was calling to speak with someone about getting there prolia shot they missed a couple of months ago pt was seeing a Dr in [place] but they retired wanted to see what could be done about getting there prolia shot done in [place] pt stated to please call Thank you  Please Advise-Thank you

## 2022-03-29 ENCOUNTER — TELEPHONE (OUTPATIENT)
Dept: RHEUMATOLOGY | Facility: CLINIC | Age: 72
End: 2022-03-29
Payer: MEDICARE

## 2022-03-29 NOTE — TELEPHONE ENCOUNTER
Pt called to r/s appts tomorrow due to weather concerns, appts moved to 4/1 dexa in [facility], appts 4/6 @2pm with Gabe cardona after, Verbalized understanding.

## 2022-03-31 ENCOUNTER — TELEPHONE (OUTPATIENT)
Dept: ADMINISTRATIVE | Facility: HOSPITAL | Age: 72
End: 2022-03-31
Payer: MEDICARE

## 2022-04-01 ENCOUNTER — HOSPITAL ENCOUNTER (OUTPATIENT)
Dept: RADIOLOGY | Facility: HOSPITAL | Age: 72
Discharge: HOME | End: 2022-04-01
Payer: MEDICARE

## 2022-04-01 DIAGNOSIS — M81.0 OSTEOPOROSIS, POSTMENOPAUSAL: ICD-10-CM

## 2022-04-01 PROCEDURE — 77080 DXA BONE DENSITY AXIAL: CPT | Mod: 26,,, | Performed by: RADIOLOGY

## 2022-04-01 PROCEDURE — 77080 DEXA BONE DENSITY SPINE HIP: ICD-10-PCS | Mod: 26,,, | Performed by: RADIOLOGY

## 2022-04-01 PROCEDURE — 77080 DXA BONE DENSITY AXIAL: CPT | Mod: TC,PO

## 2022-04-04 RX ORDER — ESOMEPRAZOLE MAGNESIUM 40 MG/1
CAPSULE, DELAYED RELEASE ORAL
Qty: 90 CAPSULE | Refills: 2 | Status: SHIPPED | OUTPATIENT
Start: 2022-04-04 | End: 2023-01-13

## 2022-04-04 NOTE — TELEPHONE ENCOUNTER
Refill Routing Note   Medication(s) are not appropriate for processing by [facility] for the following reason(s):      - Drug-Disease Interaction ( osteoporosis)    [facility] action(s):  Defer Medication-related problems identified: Drug-disease interaction        Medication reconciliation completed: No     Appointments  past 12m or future 3m with PCP    Date Provider   Last Visit   2/7/2022 Arnoldo Miller MD   Next Visit   Visit date not found Arnoldo Miller MD   ED visits in past 90 days: 1        Note composed:1:37 PM 04/04/2022

## 2022-04-05 ENCOUNTER — TELEPHONE (OUTPATIENT)
Dept: RHEUMATOLOGY | Facility: CLINIC | Age: 72
End: 2022-04-05
Payer: MEDICARE

## 2022-04-06 ENCOUNTER — OFFICE VISIT (OUTPATIENT)
Dept: RHEUMATOLOGY | Facility: CLINIC | Age: 72
End: 2022-04-06
Payer: MEDICARE

## 2022-04-06 ENCOUNTER — INFUSION (OUTPATIENT)
Dept: INFUSION THERAPY | Facility: HOSPITAL | Age: 72
End: 2022-04-06
Attending: INTERNAL MEDICINE
Payer: MEDICARE

## 2022-04-06 VITALS
WEIGHT: 232.38 LBS | HEART RATE: 83 BPM | BODY MASS INDEX: 45.62 KG/M2 | DIASTOLIC BLOOD PRESSURE: 73 MMHG | SYSTOLIC BLOOD PRESSURE: 139 MMHG | HEIGHT: 60 IN

## 2022-04-06 DIAGNOSIS — D86.9 SARCOID: ICD-10-CM

## 2022-04-06 DIAGNOSIS — M81.0 OSTEOPOROSIS, POSTMENOPAUSAL: Primary | ICD-10-CM

## 2022-04-06 DIAGNOSIS — M12.811 ROTATOR CUFF TEAR ARTHROPATHY OF RIGHT SHOULDER: ICD-10-CM

## 2022-04-06 DIAGNOSIS — M81.0 AGE-RELATED OSTEOPOROSIS WITHOUT CURRENT PATHOLOGICAL FRACTURE: Primary | ICD-10-CM

## 2022-04-06 DIAGNOSIS — M47.816 SPONDYLOSIS OF LUMBAR REGION WITHOUT MYELOPATHY OR RADICULOPATHY: ICD-10-CM

## 2022-04-06 DIAGNOSIS — Z51.81 MEDICATION MONITORING ENCOUNTER: ICD-10-CM

## 2022-04-06 DIAGNOSIS — M75.101 ROTATOR CUFF TEAR ARTHROPATHY OF RIGHT SHOULDER: ICD-10-CM

## 2022-04-06 DIAGNOSIS — D68.51 FACTOR V LEIDEN: ICD-10-CM

## 2022-04-06 PROCEDURE — 96372 THER/PROPH/DIAG INJ SC/IM: CPT

## 2022-04-06 PROCEDURE — 99215 PR OFFICE/OUTPT VISIT, EST, LEVL V, 40-54 MIN: ICD-10-PCS | Mod: S$GLB,,,

## 2022-04-06 PROCEDURE — 3078F DIAST BP <80 MM HG: CPT | Mod: CPTII,S$GLB,,

## 2022-04-06 PROCEDURE — 99999 PR PBB SHADOW E&M-EST. PATIENT-LVL III: ICD-10-PCS | Mod: PBBFAC,,,

## 2022-04-06 PROCEDURE — 1100F PR PT FALLS ASSESS DOC 2+ FALLS/FALL W/INJURY/YR: ICD-10-PCS | Mod: CPTII,S$GLB,,

## 2022-04-06 PROCEDURE — 3288F PR FALLS RISK ASSESSMENT DOCUMENTED: ICD-10-PCS | Mod: CPTII,S$GLB,,

## 2022-04-06 PROCEDURE — 99999 PR PBB SHADOW E&M-EST. PATIENT-LVL III: CPT | Mod: PBBFAC,,,

## 2022-04-06 PROCEDURE — 99215 OFFICE O/P EST HI 40 MIN: CPT | Mod: S$GLB,,,

## 2022-04-06 PROCEDURE — 1160F PR REVIEW ALL MEDS BY PRESCRIBER/CLIN PHARMACIST DOCUMENTED: ICD-10-PCS | Mod: CPTII,S$GLB,,

## 2022-04-06 PROCEDURE — 3008F PR BODY MASS INDEX (BMI) DOCUMENTED: ICD-10-PCS | Mod: CPTII,S$GLB,,

## 2022-04-06 PROCEDURE — 1159F PR MEDICATION LIST DOCUMENTED IN MEDICAL RECORD: ICD-10-PCS | Mod: CPTII,S$GLB,,

## 2022-04-06 PROCEDURE — 1100F PTFALLS ASSESS-DOCD GE2>/YR: CPT | Mod: CPTII,S$GLB,,

## 2022-04-06 PROCEDURE — 63600175 PHARM REV CODE 636 W HCPCS: Mod: JG | Performed by: PHYSICIAN ASSISTANT

## 2022-04-06 PROCEDURE — 3008F BODY MASS INDEX DOCD: CPT | Mod: CPTII,S$GLB,,

## 2022-04-06 PROCEDURE — 1157F ADVNC CARE PLAN IN RCRD: CPT | Mod: CPTII,S$GLB,,

## 2022-04-06 PROCEDURE — 1126F AMNT PAIN NOTED NONE PRSNT: CPT | Mod: CPTII,S$GLB,,

## 2022-04-06 PROCEDURE — 3075F PR MOST RECENT SYSTOLIC BLOOD PRESS GE 130-139MM HG: ICD-10-PCS | Mod: CPTII,S$GLB,,

## 2022-04-06 PROCEDURE — 1126F PR PAIN SEVERITY QUANTIFIED, NO PAIN PRESENT: ICD-10-PCS | Mod: CPTII,S$GLB,,

## 2022-04-06 PROCEDURE — 3075F SYST BP GE 130 - 139MM HG: CPT | Mod: CPTII,S$GLB,,

## 2022-04-06 PROCEDURE — 3288F FALL RISK ASSESSMENT DOCD: CPT | Mod: CPTII,S$GLB,,

## 2022-04-06 PROCEDURE — 3078F PR MOST RECENT DIASTOLIC BLOOD PRESSURE < 80 MM HG: ICD-10-PCS | Mod: CPTII,S$GLB,,

## 2022-04-06 PROCEDURE — 1157F PR ADVANCE CARE PLAN OR EQUIV PRESENT IN MEDICAL RECORD: ICD-10-PCS | Mod: CPTII,S$GLB,,

## 2022-04-06 PROCEDURE — 1159F MED LIST DOCD IN RCRD: CPT | Mod: CPTII,S$GLB,,

## 2022-04-06 PROCEDURE — 1160F RVW MEDS BY RX/DR IN RCRD: CPT | Mod: CPTII,S$GLB,,

## 2022-04-06 RX ADMIN — DENOSUMAB 60 MG: 60 INJECTION SUBCUTANEOUS at 02:04

## 2022-04-06 NOTE — PROGRESS NOTES
RHEUMATOLOGY OUTPATIENT CLINIC NOTE    04/06/2022    Subjective:       Patient ID: Cori Avalos is a 72 y.o. female.    Chief Complaint: Disease Management    HPI    Cori Avalos is a 72 y.o. pleasant female here for rheumatology follow up for osteoporosis and osteoarthritis.  Tolerating Prolia infusions well, she is 2 months late for her injection at this time due to needing to find a new rheumatology provider and transportation issues.  She has had 2 falls since her last appointment, both mechanical tripping over her small dog.  No resulting fractures.  She does have persistent right shoulder and right knee pain.  Recently started medicinal marijuana due to allergy to opioids which she says prevent her from having surgical repair of these joints.    She denies any swelling of any joints, redness, increased warmth any joints.  New pain in right shoulder, pain in right knee.  No upcoming planned invasive dental work.  She is on a CPAP at nighttime.  No other shortness of breath.  Rheumatologic review of systems negative otherwise.      Physical exam with tenderness to palpation right shoulder and decreased range of motion less than 90° with passive and active range of motion.  Tenderness to palpation right knee.  Currently wearing soft brace.  Walks with Rollator.  Able to rise from chair without assistance.    Past Medical History:   Diagnosis Date    AC (acromioclavicular) joint bone spurs     Acid reflux 4/14/2011    Adrenal insufficiency     Biceps muscle tear     Right    Bilateral lower extremity edema     Depression     Encounter for blood transfusion 1978    Factor V Leiden     Falls frequently     History of supraventricular tachycardia     Hypertension     IBS (irritable bowel syndrome)     Immunocompromised due to corticosteroids 4/12/2016    Long term current use of anticoagulant     Meralgia paresthetica of left side     Osteoarthritis     Osteoporosis     Restless leg  syndrome     Rotator cuff injury     Right    S/P ablation operation for arrhythmia     Sleep apnea     cpap     Past Surgical History:   Procedure Laterality Date    APPENDECTOMY      BACK SURGERY  2013 and 2018    CARDIAC ELECTROPHYSIOLOGY STUDY AND ABLATION      CARPAL TUNNEL RELEASE Right     CHOLECYSTECTOMY      COLECTOMY      ECTOPIC PREGNANCY SURGERY      EYE SURGERY Bilateral     cataracts with iol    HERNIA REPAIR      Umbilical    INSERTION OF IMPLANTABLE LOOP RECORDER N/A 4/27/2021    Procedure: INSERTION, IMPLANTABLE LOOP RECORDER;  Surgeon: Melo Colin MD;  Location: [facility];  Service: Cardiology;  Laterality: N/A;    JOINT REPLACEMENT Left     Knee    SHOULDER SURGERY Right     Dr Guevara     sinus cleaning      TONSILLECTOMY      TUBAL LIGATION      uterine suspension      VEIN LIGATION AND STRIPPING Bilateral      Family History   Problem Relation Age of Onset    Cancer Mother     Breast cancer Mother 65    Heart disease Father     Stroke Father     Hypertension Father     Hypertension Sister     Diabetes Daughter     Alzheimer's disease Maternal Grandmother     Breast cancer Maternal Grandmother         age unknown     Social History     Socioeconomic History    Marital status:      Spouse name: william    Occupational History    Occupation: retired     Tobacco Use    Smoking status: Never Smoker    Smokeless tobacco: Never Used   Substance and Sexual Activity    Alcohol use: No    Drug use: No    Sexual activity: Not Currently     Partners: Male   Social History Narrative    ** Merged History Encounter **          Review of patient's allergies indicates:   Allergen Reactions    Cyclobenzaprine Hallucinations    Gabapentin Edema and Other (See Comments)    Opioids - morphine analogues Anxiety and Hallucinations    Penicillins Hives     Other reaction(s): Unknown    Pregabalin Other (See Comments)    Ceftin [cefuroxime axetil] Nausea And  Vomiting    Dilaudid [hydromorphone]     Neurontin [gabapentin] Swelling    Opioids-meperidine and related Other (See Comments)     AMS    Oxycodone     Oxycodone-acetaminophen Hallucinations    Ceftin [cefuroxime axetil] Rash    Penicillins Rash           Objective:   /73   Pulse 83   Ht 5' (1.524 m)   Wt 105.4 kg (232 lb 5.8 oz)   BMI 45.38 kg/m²   Immunization History   Administered Date(s) Administered    COVID-19, MRNA, LN-S, PF (Pfizer) (Purple Cap) 01/08/2021, 01/29/2021, 10/01/2021    Influenza 10/18/2008, 10/10/2011, 10/02/2012, 09/18/2018    Influenza - High Dose - PF (65 years and older) 11/12/2015, 10/04/2016, 09/13/2018, 09/10/2019, 10/25/2021    Influenza - Quadrivalent - High Dose - PF (65 years and older) 09/23/2020    Influenza - Quadrivalent - PF *Preferred* (6 months and older) 09/21/2017, 09/13/2018    Influenza - Trivalent (ADULT) 10/18/2008, 10/10/2011, 10/02/2012    Pneumococcal Conjugate - 13 Valent 01/01/2009    Pneumococcal Polysaccharide - 23 Valent 07/07/2014    Tdap 02/24/2014, 09/01/2015    Zoster 10/14/2013          Recent Results (from the past 672 hour(s))   COMPREHENSIVE METABOLIC PANEL    Collection Time: 03/16/22  9:03 AM   Result Value Ref Range    Sodium 142 136 - 145 mmol/L    Potassium 4.1 3.5 - 5.1 mmol/L    Chloride 107 95 - 110 mmol/L    CO2 27 23 - 29 mmol/L    Glucose 111 (H) 70 - 110 mg/dL    BUN 27 (H) 8 - 23 mg/dL    Creatinine 1.0 0.5 - 1.4 mg/dL    Calcium 9.6 8.7 - 10.5 mg/dL    Total Protein 7.0 6.0 - 8.4 g/dL    Albumin 3.6 3.5 - 5.2 g/dL    Total Bilirubin 0.5 0.1 - 1.0 mg/dL    Alkaline Phosphatase 107 55 - 135 U/L    AST 17 10 - 40 U/L    ALT 16 10 - 44 U/L    Anion Gap 8 8 - 16 mmol/L    eGFR if African American >60 >60 mL/min/1.73 m^2    eGFR if non African American 56 (A) >60 mL/min/1.73 m^2        No results found for: TBGOLDPLUS   Lab Results   Component Value Date    HEPAIGM Negative 11/19/2013    HEPBIGM Negative 11/19/2013     HEPCAB Negative 04/11/2017      DEXA 04/01/2022:  L1-L3 -0.2, FN-2.2, TH-1.3, 28.0% increase in spine,-6.2% decline in hip 18.2% major FRAX, hip FRAX 4.2%    Assessment:       1. Osteoporosis, postmenopausal    2. Rotator cuff tear arthropathy of right shoulder    3. Spondylosis of lumbar region without myelopathy or radiculopathy    4. Factor V Leiden    5. Medication monitoring encounter    6. Sarcoid          Impression:     Osteoporosis: tx with prolia since at least 2013 w improved bmd; h/o vertebral fxs a R shoulder fx, taking daily vit D no ca 1000mg/day supplement   Most recent Prolia 08/12/2021.  DEXA April 2022 increase in Spine (arthritis?), stable at hip.  Osteopenia scores.  Reviewed and discussed with patient.     Chronic back pain: fusion in 2018, see by pain mgt, stimulator in place, allergy to all narcotics      Chronic r shoulder pain: s/p rtc damage and injury in 2020 (fx); injected by ortho q 3 mos.  Now using medicinal marijuana.     H/o sarcoid (skin and lung involvement): nodules not sarcoid by bx, no sign of activity today ; h/o hcq and mtx-questionable diagnosis.  Patient would like 2nd opinion regarding nodules that persist on her bilateral forearms which she states are growing.     Factor V leiden def: on eliquis chronically .  Now on 5 mg b.i.d..  Follows with Dr. Melo Colin.      Med monitoring: no issues w prolia     Plan:          Prolia today.  CMP greater than 2-week-old.  Will need to repeat prior to injection today.  Okay to proceed with injection while awaiting results.  Discussed risks with patient.  Most recent calcium level normal kidney function stable.  Patient aware of risks including hypocalcemia.  I will monitor lab when they come back and call her if any adjustments are needed.    Continue vitamin-D supplementation.  Weight-bearing activities as tolerated.  Caution to avoid falls.    Dermatology referral.  Patient prefers [facility].  She would like 2nd opinion on  masses in her arms.    Trial topical CBD oil, topical Voltaren 3-4 times daily as needed for shoulder and knee pain.    Follow up 6 months (after 10/06/2022) for Prolia with CMP 1 week prior in [place].     Brandy Bernal PA-C  [facility]     45 minutes of total time spent on the encounter, which includes face to face time and non-face to face time preparing to see the patient (eg, review of tests), Obtaining and/or reviewing separately obtained history, Documenting clinical information in the electronic or other health record, Independently interpreting results (not separately reported) and communicating results to the patient/family/caregiver, or Care coordination (not separately reported).

## 2022-05-06 ENCOUNTER — IMMUNIZATION (OUTPATIENT)
Dept: FAMILY MEDICINE | Facility: CLINIC | Age: 72
End: 2022-05-06
Payer: MEDICARE

## 2022-05-06 DIAGNOSIS — Z23 NEED FOR VACCINATION: Primary | ICD-10-CM

## 2022-05-06 PROCEDURE — 91305 COVID-19, MRNA, LNP-S, PF, 30 MCG/0.3 ML DOSE VACCINE (PFIZER): CPT | Mod: PBBFAC | Performed by: RADIOLOGY

## 2022-05-09 ENCOUNTER — PATIENT MESSAGE (OUTPATIENT)
Dept: SMOKING CESSATION | Facility: CLINIC | Age: 72
End: 2022-05-09
Payer: MEDICARE

## 2022-05-31 RX ORDER — DIPHENOXYLATE HYDROCHLORIDE AND ATROPINE SULFATE 2.5; .025 MG/1; MG/1
1 TABLET ORAL 4 TIMES DAILY PRN
Qty: 15 TABLET | Refills: 0 | Status: SHIPPED | OUTPATIENT
Start: 2022-05-31 | End: 2022-08-22

## 2022-07-06 ENCOUNTER — TELEPHONE (OUTPATIENT)
Dept: UROLOGY | Facility: CLINIC | Age: 72
End: 2022-07-06
Payer: MEDICARE

## 2022-07-06 NOTE — TELEPHONE ENCOUNTER
----- Message from Mavis English sent at 7/5/2022  5:06 PM CDT -----  Contact: Patient  Type:  Sooner Appointment Request      Name of Caller:Patient   When is the first available appointment?08/31/2022  Symptoms:Frequent UTI  Would the patient rather a call back or a response via [facility]? Call back   Best Call Back Number:[phone]  Additional Information: Please assist

## 2022-07-22 ENCOUNTER — TELEPHONE (OUTPATIENT)
Dept: NEUROLOGY | Facility: CLINIC | Age: 72
End: 2022-07-22
Payer: MEDICARE

## 2022-07-22 NOTE — TELEPHONE ENCOUNTER
Spoke with patient's daughter and scheduled appointment for August 25 at 2 pm with Karina Haney NP for memory loss.

## 2022-07-22 NOTE — TELEPHONE ENCOUNTER
----- Message from Jean-Pierre Vang sent at 7/22/2022  3:42 PM CDT -----  Contact: Nory  Type:  Patient Call          Who Called: daughter  Nory         Does the patient know what this is regarding?: calling to request a appt for PT that's having memory loss ;please advise           Would the patient rather a call back or a response via [facility]? Call           Best Call Back Number:[phone] Nory             Additional Information:

## 2022-07-25 ENCOUNTER — TELEPHONE (OUTPATIENT)
Dept: FAMILY MEDICINE | Facility: CLINIC | Age: 72
End: 2022-07-25
Payer: MEDICARE

## 2022-07-25 DIAGNOSIS — R41.3 MEMORY LOSS: Primary | ICD-10-CM

## 2022-07-25 DIAGNOSIS — N39.0 RECURRENT UTI: ICD-10-CM

## 2022-07-25 NOTE — TELEPHONE ENCOUNTER
----- Message from Jessica Dunn sent at 7/25/2022 11:20 AM CDT -----  Regarding: Referral  Contact: Patient  Patient is scheduled to see neurology on 08/25/2022 due to memory loss   Patient is requesting physician to submit a referral on her behalf for appt due to insurance   Patient stated she also has some issues with a UTI and was advised to see Urology due to reoccurrence  Please Assist     Patient can be reached at [phone]

## 2022-08-17 NOTE — TELEPHONE ENCOUNTER
Spoke with pt and she requested to scheduled an appointment an with Ariana Quigley PA-C to discuss stopping the plaquenil since her other doctors do not think she has scaroid anymore. Scheduled appointment for 4-23-18 at 11 am. Pt verbalized understanding.   Glycopyrrolate Counseling:  I discussed with the patient the risks of glycopyrrolate including but not limited to skin rash, drowsiness, dry mouth, difficulty urinating, and blurred vision.

## 2022-08-22 RX ORDER — DIPHENOXYLATE HYDROCHLORIDE AND ATROPINE SULFATE 2.5; .025 MG/1; MG/1
TABLET ORAL
Qty: 30 TABLET | Refills: 0 | Status: SHIPPED | OUTPATIENT
Start: 2022-08-22

## 2022-08-25 ENCOUNTER — OFFICE VISIT (OUTPATIENT)
Dept: FAMILY MEDICINE | Facility: CLINIC | Age: 72
End: 2022-08-25
Payer: MEDICARE

## 2022-08-25 ENCOUNTER — OFFICE VISIT (OUTPATIENT)
Dept: NEUROLOGY | Facility: CLINIC | Age: 72
End: 2022-08-25
Payer: MEDICARE

## 2022-08-25 ENCOUNTER — LAB VISIT (OUTPATIENT)
Dept: LAB | Facility: HOSPITAL | Age: 72
End: 2022-08-25
Attending: NURSE PRACTITIONER
Payer: MEDICARE

## 2022-08-25 VITALS
WEIGHT: 235.44 LBS | BODY MASS INDEX: 41.71 KG/M2 | SYSTOLIC BLOOD PRESSURE: 136 MMHG | HEART RATE: 77 BPM | HEIGHT: 63 IN | DIASTOLIC BLOOD PRESSURE: 74 MMHG | RESPIRATION RATE: 18 BRPM

## 2022-08-25 VITALS
SYSTOLIC BLOOD PRESSURE: 114 MMHG | BODY MASS INDEX: 41.64 KG/M2 | WEIGHT: 235 LBS | DIASTOLIC BLOOD PRESSURE: 68 MMHG | HEIGHT: 63 IN

## 2022-08-25 DIAGNOSIS — M19.012 PRIMARY OSTEOARTHRITIS OF BOTH SHOULDERS: ICD-10-CM

## 2022-08-25 DIAGNOSIS — G47.33 OSA (OBSTRUCTIVE SLEEP APNEA): ICD-10-CM

## 2022-08-25 DIAGNOSIS — F33.9 EPISODE OF RECURRENT MAJOR DEPRESSIVE DISORDER, UNSPECIFIED DEPRESSION EPISODE SEVERITY: ICD-10-CM

## 2022-08-25 DIAGNOSIS — Z99.81 CHRONIC RESPIRATORY FAILURE WITH HYPOXIA, ON HOME OXYGEN THERAPY: ICD-10-CM

## 2022-08-25 DIAGNOSIS — R41.3 MEMORY LOSS: ICD-10-CM

## 2022-08-25 DIAGNOSIS — J96.11 CHRONIC RESPIRATORY FAILURE WITH HYPOXIA, ON HOME OXYGEN THERAPY: ICD-10-CM

## 2022-08-25 DIAGNOSIS — F32.A DEPRESSION, UNSPECIFIED DEPRESSION TYPE: ICD-10-CM

## 2022-08-25 DIAGNOSIS — R53.83 FATIGUE, UNSPECIFIED TYPE: ICD-10-CM

## 2022-08-25 DIAGNOSIS — I48.0 PAROXYSMAL ATRIAL FIBRILLATION: ICD-10-CM

## 2022-08-25 DIAGNOSIS — R29.6 FREQUENT FALLS: ICD-10-CM

## 2022-08-25 DIAGNOSIS — I10 ESSENTIAL (PRIMARY) HYPERTENSION: ICD-10-CM

## 2022-08-25 DIAGNOSIS — M19.011 PRIMARY OSTEOARTHRITIS OF BOTH SHOULDERS: ICD-10-CM

## 2022-08-25 DIAGNOSIS — G31.84 MCI (MILD COGNITIVE IMPAIRMENT): Primary | ICD-10-CM

## 2022-08-25 LAB — TSH SERPL DL<=0.005 MIU/L-ACNC: 3.76 UIU/ML (ref 0.4–4)

## 2022-08-25 PROCEDURE — 1125F PR PAIN SEVERITY QUANTIFIED, PAIN PRESENT: ICD-10-PCS | Mod: CPTII,S$GLB,, | Performed by: INTERNAL MEDICINE

## 2022-08-25 PROCEDURE — 3074F PR MOST RECENT SYSTOLIC BLOOD PRESSURE < 130 MM HG: ICD-10-PCS | Mod: CPTII,S$GLB,, | Performed by: INTERNAL MEDICINE

## 2022-08-25 PROCEDURE — 1160F RVW MEDS BY RX/DR IN RCRD: CPT | Mod: CPTII,S$GLB,, | Performed by: INTERNAL MEDICINE

## 2022-08-25 PROCEDURE — 84443 ASSAY THYROID STIM HORMONE: CPT | Performed by: NURSE PRACTITIONER

## 2022-08-25 PROCEDURE — 3078F PR MOST RECENT DIASTOLIC BLOOD PRESSURE < 80 MM HG: ICD-10-PCS | Mod: CPTII,S$GLB,, | Performed by: NURSE PRACTITIONER

## 2022-08-25 PROCEDURE — 99205 OFFICE O/P NEW HI 60 MIN: CPT | Mod: S$GLB,,, | Performed by: NURSE PRACTITIONER

## 2022-08-25 PROCEDURE — 82746 ASSAY OF FOLIC ACID SERUM: CPT | Performed by: NURSE PRACTITIONER

## 2022-08-25 PROCEDURE — 3288F PR FALLS RISK ASSESSMENT DOCUMENTED: ICD-10-PCS | Mod: CPTII,S$GLB,, | Performed by: NURSE PRACTITIONER

## 2022-08-25 PROCEDURE — 1125F AMNT PAIN NOTED PAIN PRSNT: CPT | Mod: CPTII,S$GLB,, | Performed by: NURSE PRACTITIONER

## 2022-08-25 PROCEDURE — 1125F PR PAIN SEVERITY QUANTIFIED, PAIN PRESENT: ICD-10-PCS | Mod: CPTII,S$GLB,, | Performed by: NURSE PRACTITIONER

## 2022-08-25 PROCEDURE — 86592 SYPHILIS TEST NON-TREP QUAL: CPT | Performed by: NURSE PRACTITIONER

## 2022-08-25 PROCEDURE — 82140 ASSAY OF AMMONIA: CPT | Performed by: NURSE PRACTITIONER

## 2022-08-25 PROCEDURE — 3075F PR MOST RECENT SYSTOLIC BLOOD PRESS GE 130-139MM HG: ICD-10-PCS | Mod: CPTII,S$GLB,, | Performed by: NURSE PRACTITIONER

## 2022-08-25 PROCEDURE — 99999 PR PBB SHADOW E&M-EST. PATIENT-LVL V: CPT | Mod: PBBFAC,,, | Performed by: INTERNAL MEDICINE

## 2022-08-25 PROCEDURE — 3008F BODY MASS INDEX DOCD: CPT | Mod: CPTII,S$GLB,, | Performed by: INTERNAL MEDICINE

## 2022-08-25 PROCEDURE — 1157F PR ADVANCE CARE PLAN OR EQUIV PRESENT IN MEDICAL RECORD: ICD-10-PCS | Mod: CPTII,S$GLB,, | Performed by: INTERNAL MEDICINE

## 2022-08-25 PROCEDURE — 36415 COLL VENOUS BLD VENIPUNCTURE: CPT | Mod: PO | Performed by: NURSE PRACTITIONER

## 2022-08-25 PROCEDURE — 3288F FALL RISK ASSESSMENT DOCD: CPT | Mod: CPTII,S$GLB,, | Performed by: NURSE PRACTITIONER

## 2022-08-25 PROCEDURE — 1100F PTFALLS ASSESS-DOCD GE2>/YR: CPT | Mod: CPTII,S$GLB,, | Performed by: INTERNAL MEDICINE

## 2022-08-25 PROCEDURE — 1160F RVW MEDS BY RX/DR IN RCRD: CPT | Mod: CPTII,S$GLB,, | Performed by: NURSE PRACTITIONER

## 2022-08-25 PROCEDURE — 1159F MED LIST DOCD IN RCRD: CPT | Mod: CPTII,S$GLB,, | Performed by: INTERNAL MEDICINE

## 2022-08-25 PROCEDURE — 99999 PR PBB SHADOW E&M-EST. PATIENT-LVL V: ICD-10-PCS | Mod: PBBFAC,,, | Performed by: INTERNAL MEDICINE

## 2022-08-25 PROCEDURE — 3078F DIAST BP <80 MM HG: CPT | Mod: CPTII,S$GLB,, | Performed by: NURSE PRACTITIONER

## 2022-08-25 PROCEDURE — 99215 PR OFFICE/OUTPT VISIT, EST, LEVL V, 40-54 MIN: ICD-10-PCS | Mod: S$GLB,,, | Performed by: INTERNAL MEDICINE

## 2022-08-25 PROCEDURE — 82607 VITAMIN B-12: CPT | Performed by: NURSE PRACTITIONER

## 2022-08-25 PROCEDURE — 99205 PR OFFICE/OUTPT VISIT, NEW, LEVL V, 60-74 MIN: ICD-10-PCS | Mod: S$GLB,,, | Performed by: NURSE PRACTITIONER

## 2022-08-25 PROCEDURE — 84425 ASSAY OF VITAMIN B-1: CPT | Performed by: NURSE PRACTITIONER

## 2022-08-25 PROCEDURE — 3078F DIAST BP <80 MM HG: CPT | Mod: CPTII,S$GLB,, | Performed by: INTERNAL MEDICINE

## 2022-08-25 PROCEDURE — 1160F PR REVIEW ALL MEDS BY PRESCRIBER/CLIN PHARMACIST DOCUMENTED: ICD-10-PCS | Mod: CPTII,S$GLB,, | Performed by: NURSE PRACTITIONER

## 2022-08-25 PROCEDURE — 3078F PR MOST RECENT DIASTOLIC BLOOD PRESSURE < 80 MM HG: ICD-10-PCS | Mod: CPTII,S$GLB,, | Performed by: INTERNAL MEDICINE

## 2022-08-25 PROCEDURE — 1100F PTFALLS ASSESS-DOCD GE2>/YR: CPT | Mod: CPTII,S$GLB,, | Performed by: NURSE PRACTITIONER

## 2022-08-25 PROCEDURE — 1159F PR MEDICATION LIST DOCUMENTED IN MEDICAL RECORD: ICD-10-PCS | Mod: CPTII,S$GLB,, | Performed by: NURSE PRACTITIONER

## 2022-08-25 PROCEDURE — 3008F PR BODY MASS INDEX (BMI) DOCUMENTED: ICD-10-PCS | Mod: CPTII,S$GLB,, | Performed by: INTERNAL MEDICINE

## 2022-08-25 PROCEDURE — 1157F PR ADVANCE CARE PLAN OR EQUIV PRESENT IN MEDICAL RECORD: ICD-10-PCS | Mod: CPTII,S$GLB,, | Performed by: NURSE PRACTITIONER

## 2022-08-25 PROCEDURE — 1100F PR PT FALLS ASSESS DOC 2+ FALLS/FALL W/INJURY/YR: ICD-10-PCS | Mod: CPTII,S$GLB,, | Performed by: NURSE PRACTITIONER

## 2022-08-25 PROCEDURE — 1125F AMNT PAIN NOTED PAIN PRSNT: CPT | Mod: CPTII,S$GLB,, | Performed by: INTERNAL MEDICINE

## 2022-08-25 PROCEDURE — 99999 PR PBB SHADOW E&M-EST. PATIENT-LVL V: ICD-10-PCS | Mod: PBBFAC,,, | Performed by: NURSE PRACTITIONER

## 2022-08-25 PROCEDURE — 3008F PR BODY MASS INDEX (BMI) DOCUMENTED: ICD-10-PCS | Mod: CPTII,S$GLB,, | Performed by: NURSE PRACTITIONER

## 2022-08-25 PROCEDURE — 1159F PR MEDICATION LIST DOCUMENTED IN MEDICAL RECORD: ICD-10-PCS | Mod: CPTII,S$GLB,, | Performed by: INTERNAL MEDICINE

## 2022-08-25 PROCEDURE — 1159F MED LIST DOCD IN RCRD: CPT | Mod: CPTII,S$GLB,, | Performed by: NURSE PRACTITIONER

## 2022-08-25 PROCEDURE — 99999 PR PBB SHADOW E&M-EST. PATIENT-LVL V: CPT | Mod: PBBFAC,,, | Performed by: NURSE PRACTITIONER

## 2022-08-25 PROCEDURE — 1160F PR REVIEW ALL MEDS BY PRESCRIBER/CLIN PHARMACIST DOCUMENTED: ICD-10-PCS | Mod: CPTII,S$GLB,, | Performed by: INTERNAL MEDICINE

## 2022-08-25 PROCEDURE — 3008F BODY MASS INDEX DOCD: CPT | Mod: CPTII,S$GLB,, | Performed by: NURSE PRACTITIONER

## 2022-08-25 PROCEDURE — 3288F PR FALLS RISK ASSESSMENT DOCUMENTED: ICD-10-PCS | Mod: CPTII,S$GLB,, | Performed by: INTERNAL MEDICINE

## 2022-08-25 PROCEDURE — 99215 OFFICE O/P EST HI 40 MIN: CPT | Mod: S$GLB,,, | Performed by: INTERNAL MEDICINE

## 2022-08-25 PROCEDURE — 3074F SYST BP LT 130 MM HG: CPT | Mod: CPTII,S$GLB,, | Performed by: INTERNAL MEDICINE

## 2022-08-25 PROCEDURE — 1100F PR PT FALLS ASSESS DOC 2+ FALLS/FALL W/INJURY/YR: ICD-10-PCS | Mod: CPTII,S$GLB,, | Performed by: INTERNAL MEDICINE

## 2022-08-25 PROCEDURE — 1157F ADVNC CARE PLAN IN RCRD: CPT | Mod: CPTII,S$GLB,, | Performed by: NURSE PRACTITIONER

## 2022-08-25 PROCEDURE — 1157F ADVNC CARE PLAN IN RCRD: CPT | Mod: CPTII,S$GLB,, | Performed by: INTERNAL MEDICINE

## 2022-08-25 PROCEDURE — 3288F FALL RISK ASSESSMENT DOCD: CPT | Mod: CPTII,S$GLB,, | Performed by: INTERNAL MEDICINE

## 2022-08-25 PROCEDURE — 3075F SYST BP GE 130 - 139MM HG: CPT | Mod: CPTII,S$GLB,, | Performed by: NURSE PRACTITIONER

## 2022-08-25 RX ORDER — DULOXETIN HYDROCHLORIDE 30 MG/1
30 CAPSULE, DELAYED RELEASE ORAL DAILY
Qty: 90 CAPSULE | Refills: 1 | Status: SHIPPED | OUTPATIENT
Start: 2022-08-25 | End: 2023-01-17

## 2022-08-25 RX ORDER — NYSTATIN AND TRIAMCINOLONE ACETONIDE 100000; 1 [USP'U]/G; MG/G
1 CREAM TOPICAL
COMMUNITY
Start: 2022-07-06

## 2022-08-25 RX ORDER — ESTRADIOL 0.1 MG/G
CREAM VAGINAL
COMMUNITY
Start: 2022-07-06 | End: 2022-11-25

## 2022-08-25 NOTE — PROGRESS NOTES
Subjective:    Get old note      Patient ID: Cori Avalos is a 72 y.o. female.    Chief Complaint: Follow-up and Depression  Gyn: Dr Hunt 2022  MMG:  10/21  Dexa: 2019-osteopenia -1.5 & -1.9  w fragility fracture. Tx:  [facility] mgmt in [place] at [facility]  Colonoscopy:   2/25/19 adenoma r/c 3 yr Dr Jeff   Immunizations: Flu: Tdap:  2021 cat bite Pneumovax: 2014 Prevnar 13: 2009 Shingles:old  Covid: yes   Smoker:  Never   Eye: [place]       HPI   Here with  and daughter.  Lots of medical issues since loss visit.  Frequent falls, recently admitted to [facility] for unresponsiveness was told due to carbon monoxide poisoning do due to sleep apnea.  Having more memory issues confusion, incontinent of bladder and bowel, frequent UTI.  More depressed since her PET passed away in her medical issues are getting worse.     Admitted to [facility] in spring.   come home and couldn't wake her up. After work up told carbon dioxide due to KENROY.  Pulm couldn't find info.  Using constant Oxygen   Using O2 3 liters constantly.     Now having more confusion, memory issues not remembering moves, easily falling alseep all time, has been falling. Will see Neuro for eval today.  Is taking medical marijuana and Valium 10 mg for long time.  Also amitriptyline for depression.      Recurrent UTI does wear depends.     Depression: worse with medical issues. Pet passed recently.  Cymbalta 60      Chronic arthralgias:  Shoulders are issue bicep tears and causing diff cleaning herself daughter would like to get home health and occupational therapy to assist with training possible adaptive therapy to help.   Gettting Tinciture twice daily recommend from Dr Bocanegra //helping w pain- shoulder and knee pain     HTN: controlled Rx metoprolol 50,  A fib: + loop recorder// Rx; Eliquis.& Metoprolol  /Cardio: Dr Colin   CKD stage 3:  GFR 50s-   Anemia:  Hemoglobin 10 iron sat 18 recommend get over-the-counter iron supplement take once or  "twice daily //.    RLS:  Controlled Rx Requip 5  Back DDD; Dr Bocanegra injections //Dr Flanagan has pain stimulator. //Rx 10 mg Valium nightly as a muscle relaxer from pain management  Depression: uncontrolled Cymbalta 60 mg //  Helps w chronic pain. /  Elavil 10 nightly added by pain mgmt for depression ?   Hx Cutaneous sarcoid: c/o hard nodules under skin- some questions if truly dx.  Told outside  was not sarcoid.  Allergies:  Controlled Rx astelin, Flunisolide nasal, H1   KENROY: using daily with O2 auto CPAP    GERD: controlled Rx Nexium 40     Review of Systems:  Review of Systems   Constitutional: Negative for appetite change.   HENT: Negative for nosebleeds.    Eyes: Negative for pain.   Respiratory: Negative for choking.    Cardiovascular: Positive for palpitations. Negative for chest pain.   Gastrointestinal: Negative for blood in stool.   Genitourinary: Negative for hematuria.   Musculoskeletal: Negative for joint swelling.   Skin: Negative for pallor.   Neurological: Negative for facial asymmetry.   Hematological: Does not bruise/bleed easily.   Psychiatric/Behavioral: Positive for dysphoric mood. Negative for confusion.       Objective:     Vitals:    08/25/22 1046   BP: 114/68   Weight: 106.6 kg (235 lb 0.2 oz)   Height: 5' 3" (1.6 m)          Physical Exam  Constitutional:       Appearance: Normal appearance. She is obese.   HENT:      Head: Normocephalic and atraumatic.   Eyes:      Extraocular Movements: Extraocular movements intact.      Pupils: Pupils are equal, round, and reactive to light.   Pulmonary:      Effort: Pulmonary effort is normal.   Neurological:      Mental Status: She is alert and oriented to person, place, and time.   Psychiatric:         Mood and Affect: Mood normal.         Medication List with Changes/Refills   New Medications    DULOXETINE (CYMBALTA) 30 MG CAPSULE    Take 1 capsule (30 mg total) by mouth once daily.   Current Medications    ALBUTEROL (PROVENTIL) 2.5 " MG /3 ML (0.083 %) NEBULIZER SOLUTION        ALBUTEROL (PROVENTIL/VENTOLIN HFA) 90 MCG/ACTUATION INHALER    Inhale 2 puffs into the lungs every 4 (four) hours as needed for Wheezing or Shortness of Breath.    APIXABAN (ELIQUIS) 5 MG TAB    Take 5 mg by mouth 2 (two) times daily.    AZELASTINE (ASTELIN) 137 MCG (0.1 %) NASAL SPRAY    1 spray (137 mcg total) by Nasal route 2 (two) times daily.    BENZONATATE (TESSALON) 100 MG CAPSULE    Take 100 mg by mouth 3 (three) times daily as needed.     BUDESONIDE-FORMOTEROL 80-4.5 MCG (SYMBICORT) 80-4.5 MCG/ACTUATION HFAA    Inhale 2 puffs into the lungs 2 (two) times a day. Controller    CALCIUM 500 500 MG CALCIUM (1,250 MG) TABLET    Take 1 tablet by mouth once daily.    CANNABIDIOL (EPIDIOLEX) 100 MG/ML    Take 300 mg by mouth 2 (two) times daily.    CHOLECALCIFEROL, VITAMIN D3, (VITAMIN D3) 25 MCG (1,000 UNIT) CAPSULE    Take 1,000 Units by mouth once daily.    DENOSUMAB (PROLIA) 60 MG/ML SYRG    Inject 60 mg into the skin every 6 (six) months.     DIAZEPAM (VALIUM) 10 MG TAB    Take 10 mg by mouth every evening.     DIPHENHYDRAMINE (BENADRYL) 25 MG CAPSULE    Take 25 mg by mouth every 6 (six) hours as needed.    DIPHENOXYLATE-ATROPINE 2.5-0.025 MG (LOMOTIL) 2.5-0.025 MG PER TABLET    TAKE ONE TABLET BY MOUTH FOUR TIMES DAILY AS NEEDED FOR DIARRHEA    DULOXETINE (CYMBALTA) 60 MG CAPSULE    Take 1 capsule (60 mg total) by mouth once daily.    ESOMEPRAZOLE (NEXIUM) 40 MG CAPSULE    TAKE ONE CAPSULE BY MOUTH EVERY DAY    ESTRADIOL (ESTRACE) 0.01 % (0.1 MG/GRAM) VAGINAL CREAM    SMARTSI Gram(s) Vaginal    FLUNISOLIDE 25 MCG, 0.025%, (NASALIDE) 25 MCG (0.025 %) SPRY    2 sprays by Nasal route once daily.    FUROSEMIDE (LASIX) 20 MG TABLET    Take 20 mg by mouth every other day.     GUAIFENESIN (MUCINEX) 600 MG 12 HR TABLET    Take 1,200 mg by mouth 2 (two) times daily as needed.     IBUPROFEN (ADVIL,MOTRIN) 800 MG TABLET    Take 800 mg by mouth every 6 (six) hours as needed.      LEVOCETIRIZINE (XYZAL) 5 MG TABLET    Take 1 tablet (5 mg total) by mouth every evening.    METOPROLOL TARTRATE (LOPRESSOR) 50 MG TABLET    Take 75 mg by mouth 2 (two) times daily. Pt taking 1 & a half tablets twice a day.    NYSTATIN-TRIAMCINOLONE (MYCOLOG II) CREAM    SMARTSIG:Sparingly Topical 2-3 Times Daily PRN    ONDANSETRON (ZOFRAN-ODT) 4 MG TBDL    Take 1 tablet (4 mg total) by mouth every 8 (eight) hours as needed.    POTASSIUM CHLORIDE (KLOR-CON) 10 MEQ TBSR    Take 10 mEq by mouth once daily.    ROPINIROLE (REQUIP) 5 MG TABLET    Take 1 tablet (5 mg total) by mouth nightly.    TIZANIDINE (ZANAFLEX) 4 MG TABLET    Take 4 mg by mouth every 8 (eight) hours.     TRAMADOL (ULTRAM) 50 MG TABLET    tramadol 50 mg tablet   Take 1 tablet every 6 hours by oral route.   Discontinued Medications    ALPRAZOLAM (XANAX) 0.5 MG TABLET    Take by mouth.    AMITRIPTYLINE (ELAVIL) 10 MG TABLET    Take 10 mg by mouth every evening.     NITROFURANTOIN, MACROCRYSTAL-MONOHYDRATE, (MACROBID) 100 MG CAPSULE    Take 100 mg by mouth nightly.    SULFAMETHOXAZOLE-TRIMETHOPRIM 800-160MG (BACTRIM DS) 800-160 MG TAB    Take 1 tablet by mouth 2 (two) times daily.       Assessment & Plan:  1. MCI (mild cognitive impairment)    2. Episode of recurrent major depressive disorder, unspecified depression episode severity    3. Essential (primary) hypertension  - Ambulatory referral/consult to Home Health; Future    4. Primary osteoarthritis of both shoulders  - Ambulatory referral/consult to Home Health; Future    5. Frequent falls  - Ambulatory referral/consult to Home Health; Future    6. Fatigue, unspecified type    7. Chronic respiratory failure with hypoxia, on home oxygen therapy     MCI (mild cognitive impairment)  Comments:  recommend wean off diazpam     Episode of recurrent major depressive disorder, unspecified depression episode severity    Essential (primary) hypertension  -     Ambulatory referral/consult to Home Health;  Future; Expected date: 08/26/2022    Primary osteoarthritis of both shoulders  Comments:  OT [facility] for  help with toileting ADLs  Orders:  -     Ambulatory referral/consult to Home Health; Future; Expected date: 08/26/2022    Frequent falls  -     Ambulatory referral/consult to Home Health; Future; Expected date: 08/26/2022    Fatigue, unspecified type    Chronic respiratory failure with hypoxia, on home oxygen therapy    Other orders  -     DULoxetine (CYMBALTA) 30 MG capsule; Take 1 capsule (30 mg total) by mouth once daily.  Dispense: 90 capsule; Refill: 1        Continue to work on regular exercise, maintain healthy weight, balanced diet. Avoid unhealthy habits: smoking, excessive alcohol intake.

## 2022-08-25 NOTE — PROGRESS NOTES
NEUROLOGY  Outpatient CONSULT    [facility]  [address] [facility], [place] [address]  [phone] (office) / [phone] (fax)    Patient Name:  Cori Avalos  :  1950  MR #:  [id number]  Acct #:  [id number]    Date of Neurology Consult: 2022  Name of Provider: SIVA Alexandre    Other Physicians:  Arnoldo Milelr MD (Primary Care Physician); Self, Aaareferral (Referring)      Chief Complaint: Memory Loss      History of Present Illness (HPI):  Cori Avalos is a right handed 72 y.o. female with a PMHX of KENROY, O2 dependence  Patient is here memory loss. She is accompanied by her spouse and daughter who help supply information. Family reports progressive memory issues. Family reports in April she was unable to be aroused. EMS was called and about 20 minutes later she woke up. She was brought to [facility] and was never given an actual diagnosis. Her carbon dioxide level was elevated while inpatient. This workup and treatment has been ongoing and she is actively following pulmonology. Her CPAP settings have recently been adjusted.   Family continues to report short term memory loss. She repeats often and will call her grandchildren by their parents names.     Patient's highest level of education completed was masters degrees. 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 She struggles more with short term memory loss. Patient reports more frustration and being short tempered. She may get defensive. She admits to depression and takes Cymbalta. Her PCP will be increasing Cymbalta and discontinuing Elavil as this was not helping her mood. She is also on Valium for leg pain but will be weaning off of this medication. She denies suicidal ideation. Spouse has noticed she is confused upon waking up. She was diagnosed with KENROY many years and compliant the CPAP. It was recalled but was sent a new machine but wasn't set to the correct settings. She does sleep well  "throughout the night and may have bathroom breaks. She reports being "terrible" with executive function but use to do well. Daughter states she was mixing up dates and times on her calender. Her son is now managing the finances and has done so since July. He has financial power of  and her daughter has medical power of . Previously she had forgotten to pay her electricity bill and her power did wind up getting turned off. Patient is managing her own medications with oversight from her spouse. She needs assistance with showers and baths due to torn rotator cuffs. She is able to dress herself, feed herself, brush her teeth etc. She has difficulty with her legs and putting pants on. There are no word finding difficulties. She reports urinary stress incontinence and wears depends. Her last fall was 2 days ago. She states her legs gave her out but also tripped over a box. Her falls seem to happen when she becomes impulsive and wants to get something or go somewhere quickly. She is no longer driving. She likes to chanelle and cross stitch but has not been able to do a lot of activities as of late.           Prior HPI from Dr. Loving 2019  "Ms. Avalos is a 69 y.o. female with sarcoidosis (pulmonary and skin) who presents referred by Arnoldo Miller* today for evaluation of memory loss and imbalance. The patient was accompanied by her daughter and  who also contributed to the following history.      She has had multiple falls in the past year. Her right leg is weak and she was falling, felt like due to L4/5. She had surgery in Oct 2018 and did not do well post operatively. She was on opioids and was delirious afterwards. She then had a UTI and this triggered delirium as well. She was at [facility] for 2 weeks after. She also was deilrious after knee surgery in 2016.      She had a severe fall with a concussion. She last fell on Sunday and mary tot the ER.      Her daughter notes that she has a " "hard time following conversations. She has trouble finding words. It is more short term things. No confusion with how to cook things, navigate, etc. Her  notes that the things are similar to aging. She has gotten three of her granddaughters confused. She has called the dog by the wrong name. She doesn't feel as rational as she used to be. She feels she has lost some intelligence. She was a teacher (calculus) and type A personality.      She denies any vertigo or imbalance in her head. She is very impulsive as well. She rushes and doesn't take her time.      Gabapentin gives her edema.        The memory has been declining for >1 year. She has short term memory trouble and difficulty concentrating. This was worse after her surgery but has not seemed to return to her baseline before surgery.      Her maternal grandmother had Alzheimer's dementia.      She has a history of sarcoidosis with pulmonary and skin involvement. She was previously on steroids and plaquenil but is off. She was on methotrexate. Her daughter noticed a personality change on that and she is off as they were told that she doesn't actually have sarcoidosis.      MRI brain in July 2018 without contrast showed some white matter hyperintensities.      She takes valium nightly for muscle relaxation. She is on amitriptyline and Cymbalta. She was on zanaflex but discontinued."            Past Medical, Surgical, Family & Social History:   Past Medical History:   Diagnosis Date    AC (acromioclavicular) joint bone spurs     Acid reflux 4/14/2011    Adrenal insufficiency     Biceps muscle tear     Right    Bilateral lower extremity edema     Depression     Encounter for blood transfusion 1978    Factor V Leiden     Falls frequently     History of supraventricular tachycardia     Hypertension     IBS (irritable bowel syndrome)     Immunocompromised due to corticosteroids 4/12/2016    Long term current use of anticoagulant     Meralgia paresthetica of " left side     Osteoarthritis     Osteoporosis     Restless leg syndrome     Rotator cuff injury     Right    S/P ablation operation for arrhythmia     Sleep apnea     cpap     Past Surgical History:   Procedure Laterality Date    APPENDECTOMY      BACK SURGERY  2013 and 2018    CARDIAC ELECTROPHYSIOLOGY STUDY AND ABLATION      CARPAL TUNNEL RELEASE Right     CHOLECYSTECTOMY      COLECTOMY      ECTOPIC PREGNANCY SURGERY      EYE SURGERY Bilateral     cataracts with iol    HERNIA REPAIR      Umbilical    INSERTION OF IMPLANTABLE LOOP RECORDER N/A 4/27/2021    Procedure: INSERTION, IMPLANTABLE LOOP RECORDER;  Surgeon: Melo Colin MD;  Location: [facility];  Service: Cardiology;  Laterality: N/A;    JOINT REPLACEMENT Left     Knee    SHOULDER SURGERY Right     Dr Guevara     sinus cleaning      TONSILLECTOMY      TUBAL LIGATION      uterine suspension      VEIN LIGATION AND STRIPPING Bilateral      Family History   Problem Relation Age of Onset    Cancer Mother     Breast cancer Mother 65    Heart disease Father     Stroke Father     Hypertension Father     Hypertension Sister     Diabetes Daughter     Alzheimer's disease Maternal Grandmother     Breast cancer Maternal Grandmother         age unknown     Alcohol use:  reports no history of alcohol use.   (Of note, 0.6 oz = 1 beer or 6 oz = 10 beers).  Tobacco use:  reports that she has never smoked. She has never used smokeless tobacco.  Street drug use:  reports no history of drug use.  Allergies: Cyclobenzaprine, Gabapentin, Opioids - morphine analogues, Penicillins, Pregabalin, Ceftin [cefuroxime axetil], Dilaudid [hydromorphone], Neurontin [gabapentin], Opioids-meperidine and related, Oxycodone, Oxycodone-acetaminophen, Ceftin [cefuroxime axetil], and Penicillins.    Home Medications:     Current Outpatient Medications:     albuterol (PROVENTIL) 2.5 mg /3 mL (0.083 %) nebulizer solution, , Disp: , Rfl:     albuterol (PROVENTIL/VENTOLIN HFA) 90 mcg/actuation inhaler,  Inhale 2 puffs into the lungs every 4 (four) hours as needed for Wheezing or Shortness of Breath., Disp: 18 g, Rfl: 0    apixaban (ELIQUIS) 5 mg Tab, Take 5 mg by mouth 2 (two) times daily., Disp: , Rfl:     azelastine (ASTELIN) 137 mcg (0.1 %) nasal spray, 1 spray (137 mcg total) by Nasal route 2 (two) times daily., Disp: 30 mL, Rfl: 12    benzonatate (TESSALON) 100 MG capsule, Take 100 mg by mouth 3 (three) times daily as needed. , Disp: , Rfl:     budesonide-formoterol 80-4.5 mcg (SYMBICORT) 80-4.5 mcg/actuation HFAA, Inhale 2 puffs into the lungs 2 (two) times a day. Controller, Disp: 1 g, Rfl: 5    CALCIUM 500 500 mg calcium (1,250 mg) tablet, Take 1 tablet by mouth once daily., Disp: , Rfl:     cannabidioL (EPIDIOLEX) 100 mg/mL, Take 300 mg by mouth 2 (two) times daily., Disp: , Rfl:     cholecalciferol, vitamin D3, (VITAMIN D3) 25 mcg (1,000 unit) capsule, Take 1,000 Units by mouth once daily., Disp: , Rfl:     denosumab (PROLIA) 60 mg/mL Syrg, Inject 60 mg into the skin every 6 (six) months. , Disp: , Rfl:     diazePAM (VALIUM) 10 MG Tab, Take 10 mg by mouth every evening. , Disp: , Rfl:     diphenoxylate-atropine 2.5-0.025 mg (LOMOTIL) 2.5-0.025 mg per tablet, TAKE ONE TABLET BY MOUTH FOUR TIMES DAILY AS NEEDED FOR DIARRHEA, Disp: 30 tablet, Rfl: 0    DULoxetine (CYMBALTA) 30 MG capsule, Take 1 capsule (30 mg total) by mouth once daily., Disp: 90 capsule, Rfl: 1    DULoxetine (CYMBALTA) 60 MG capsule, Take 1 capsule (60 mg total) by mouth once daily., Disp: 90 capsule, Rfl: 2    esomeprazole (NEXIUM) 40 MG capsule, TAKE ONE CAPSULE BY MOUTH EVERY DAY, Disp: 90 capsule, Rfl: 2    estradioL (ESTRACE) 0.01 % (0.1 mg/gram) vaginal cream, SMARTSI Gram(s) Vaginal, Disp: , Rfl:     flunisolide 25 mcg, 0.025%, (NASALIDE) 25 mcg (0.025 %) Spry, 2 sprays by Nasal route once daily., Disp: 25 mL, Rfl: 5    furosemide (LASIX) 20 MG tablet, Take 20 mg by mouth every other day. , Disp: , Rfl: 3    guaiFENesin (MUCINEX)  "600 mg 12 hr tablet, Take 1,200 mg by mouth 2 (two) times daily as needed. , Disp: , Rfl:     ibuprofen (ADVIL,MOTRIN) 800 MG tablet, Take 800 mg by mouth every 6 (six) hours as needed. , Disp: , Rfl:     levocetirizine (XYZAL) 5 MG tablet, Take 1 tablet (5 mg total) by mouth every evening., Disp: 30 tablet, Rfl: 5    metoprolol tartrate (LOPRESSOR) 50 MG tablet, Take 75 mg by mouth 2 (two) times daily. Pt taking 1 & a half tablets twice a day., Disp: , Rfl:     nystatin-triamcinolone (MYCOLOG II) cream, SMARTSIG:Sparingly Topical 2-3 Times Daily PRN, Disp: , Rfl:     ondansetron (ZOFRAN-ODT) 4 MG TbDL, Take 1 tablet (4 mg total) by mouth every 8 (eight) hours as needed., Disp: 30 tablet, Rfl: 0    potassium chloride (KLOR-CON) 10 MEQ TbSR, Take 10 mEq by mouth once daily., Disp: , Rfl:     rOPINIRole (REQUIP) 5 MG tablet, Take 1 tablet (5 mg total) by mouth nightly., Disp: 90 tablet, Rfl: 2    tiZANidine (ZANAFLEX) 4 MG tablet, Take 4 mg by mouth every 8 (eight) hours. , Disp: , Rfl: 3    traMADol (ULTRAM) 50 mg tablet, tramadol 50 mg tablet  Take 1 tablet every 6 hours by oral route., Disp: , Rfl:     Physical Examination:  /74 (BP Location: Left arm, Patient Position: Sitting, BP Method: Large (Automatic))   Pulse 77   Resp 18   Ht 5' 3" (1.6 m)   Wt 106.8 kg (235 lb 7.2 oz)   BMI 41.71 kg/m²   MMSE 8/25/2022   What is the (year), (season), (date), (day), (month)? 5   Where are we (state), (country), (town or city), (hospital), (floor)? 5   Name 3 common objects (eg. "apple", "table", "allie"). Take 1 second to say each. Then ask the patient to repeat all 3. Give 1 point for each correct answer. Then repeat them until he/she learns all 3. Count trials and record. 3   Serial 7's backwards. Stop after 5 answers. (100,93,86,79,72) or alternatively  spell "WORLD" backwards. (D..L..R..O..W). The score is the number of letters in correct order. 5   Ask for the 3 common objects named earlier in the exam. Give " "1 point for each correct answer. 2   Name a "pencil" and "watch." 2   Repeat the following: "No ifs, ands, or buts." 1   Follow a 3-stage command: "Take a paper in your right hand, fold it in half, & put it on the floor." 3   Read and obey the following: (see paper exam) 1   Write a sentence. 1   Copy the following design: (see paper exam) 1   Total MMSE Score 29   Some recent data might be hidden       GENERAL:  General appearance: Well, non-toxic appearing.  No apparent distress.  Neck: supple.  BLE edema +3      MENTAL STATUS:  Alertness, attention span & concentration: normal.  Language: normal.  Orientation to self, place & time:  normal.  Memory, recent & remote: normal.  Fund of knowledge: normal.  MMSE:29/30    SPEECH:  Clear and fluent.  Follows complex commands.      CRANIAL NERVES:  Cranial Nerves II-XII were examined.  II - Visual fields: normal.  III, IV, VI: PERRL, EOMI, No ptosis, No nystagmus.  V - Facial sensation: normal.  VII - Face symmetry & mobility: normal.  VIII - Hearing: normal  IX, X - Palate: mobile & midline.  XI - Shoulder shrug: normal.  XII - Tongue protrusion: normal.        GROSS MOTOR:  Gait & station: uses walker at all times; unsteady  Tone: normal.  Abnormal movements: none.  Finger-nose: normal.  Rapid alternating movements: normal.  Pronator drift: normal      MUSCLE STRENGTH:   5/5 throughout      REFLEXES:    RIGHT Reflex   LEFT   2+ Biceps 2+   2+ Brachiorad. 2+        2+ Patellar 2+     SENSORY:  Light touch: Normal throughout.             Diagnostic Data Reviewed:      MRI brain 2/2022 (outside records):  "FINDINGS: The ventricles and sulci are mildly prominent.  There is no evidence of acute intracranial hemorrhage or infarct.  There is no evidence of mass, mass effect, or midline shift.  T2 hyperintense signal is present in the periventricular and subcortical white matter.  Postoperative changes of bilateral lens replacement are present.  Paranasal sinuses are clear.  " "Normal flow voids are present.     IMPRESSION:    1.  No acute intracranial abnormality.      2.  Involutional changes with mild chronic microvascular ischemic disease."          Assessment and Plan:  Cori Avalos is a 72 y.o. female.      Problem List Items Addressed This Visit          Neuro    Memory loss    Current Assessment & Plan     Patient is a 73 y/o female that presents for ongoing memory complaints. She was last evaluated in 2019 and believes her memory has worsened since then.   She reports difficulty with short term memory and often is repetitious as per family.   There are no reports of significant behavorial changes, hallucinations, sleep disturbances but does endorse depression and currently maintained on Cymbalta which was recently increased as per PCP  MMSE today 29/30   - prev MOCA 28/30  Suspect MCI vs early developing dementia   Recent brain imaging noted  Obtain updated serologies as recent Vitamin B12 level was low  Referral placed to Neuro psych for further eval  KENROY dx likely plays a role as well.    - pt currently with pulmonology and had recent CPAP adjustments.  Continue working with PCP on antidepressant regimen.                Psychiatric    Clinical depression    Current Assessment & Plan     Maintained on Cymbalta  PCP reportedly making dose adjustments            Cardiac/Vascular    Paroxysmal atrial fibrillation    Current Assessment & Plan     Maintained on Eliquis            Other    KENROY (obstructive sleep apnea)    Overview     cpap         Current Assessment & Plan     Following Pulm  Compliant with CPAP                          Important to note, also  has a past medical history of AC (acromioclavicular) joint bone spurs, Acid reflux (4/14/2011), Adrenal insufficiency, Biceps muscle tear, Bilateral lower extremity edema, Depression, Encounter for blood transfusion (1978), Factor V Leiden, Falls frequently, History of supraventricular tachycardia, Hypertension, IBS (irritable " bowel syndrome), Immunocompromised due to corticosteroids (4/12/2016), Long term current use of anticoagulant, Meralgia paresthetica of left side, Osteoarthritis, Osteoporosis, Restless leg syndrome, Rotator cuff injury, S/P ablation operation for arrhythmia, and Sleep apnea.            The patient will return to clinic in 6 months.        All questions were answered and patient is comfortable with the plan.       Thank you very much for the opportunity to assist in this patient's care.    If you have any questions or concerns, please do not hesitate to contact me at any time.    Sincerely,     SIVA Alexandre  [facility] [place]         MARIAN spent a total of 76 minutes on the day of the visit.This includes face to face time and non-face to face time preparing to see the patient (eg, review of tests), Obtaining and/or reviewing separately obtained history, Documenting clinical information in the electronic or other health record, Independently interpreting resultsand communicating results to the patient/family/caregiver, or Care coordination.  ---------------------------------------------                Past Medical, Surgical, Family & Social History:   Reviewed and updated.    Home Medications:     Current Outpatient Medications:     albuterol (PROVENTIL) 2.5 mg /3 mL (0.083 %) nebulizer solution, , Disp: , Rfl:     albuterol (PROVENTIL/VENTOLIN HFA) 90 mcg/actuation inhaler, Inhale 2 puffs into the lungs every 4 (four) hours as needed for Wheezing or Shortness of Breath., Disp: 18 g, Rfl: 0    apixaban (ELIQUIS) 5 mg Tab, Take 5 mg by mouth 2 (two) times daily., Disp: , Rfl:     azelastine (ASTELIN) 137 mcg (0.1 %) nasal spray, 1 spray (137 mcg total) by Nasal route 2 (two) times daily., Disp: 30 mL, Rfl: 12    benzonatate (TESSALON) 100 MG capsule, Take 100 mg by mouth 3 (three) times daily as needed. , Disp: , Rfl:     budesonide-formoterol 80-4.5 mcg (SYMBICORT) 80-4.5 mcg/actuation  HFAA, Inhale 2 puffs into the lungs 2 (two) times a day. Controller, Disp: 1 g, Rfl: 5    CALCIUM 500 500 mg calcium (1,250 mg) tablet, Take 1 tablet by mouth once daily., Disp: , Rfl:     cannabidioL (EPIDIOLEX) 100 mg/mL, Take 300 mg by mouth 2 (two) times daily., Disp: , Rfl:     cholecalciferol, vitamin D3, (VITAMIN D3) 25 mcg (1,000 unit) capsule, Take 1,000 Units by mouth once daily., Disp: , Rfl:     denosumab (PROLIA) 60 mg/mL Syrg, Inject 60 mg into the skin every 6 (six) months. , Disp: , Rfl:     diazePAM (VALIUM) 10 MG Tab, Take 10 mg by mouth every evening. , Disp: , Rfl:     diphenhydrAMINE (BENADRYL) 25 mg capsule, Take 25 mg by mouth every 6 (six) hours as needed., Disp: , Rfl:     diphenoxylate-atropine 2.5-0.025 mg (LOMOTIL) 2.5-0.025 mg per tablet, TAKE ONE TABLET BY MOUTH FOUR TIMES DAILY AS NEEDED FOR DIARRHEA, Disp: 30 tablet, Rfl: 0    DULoxetine (CYMBALTA) 30 MG capsule, Take 1 capsule (30 mg total) by mouth once daily., Disp: 90 capsule, Rfl: 1    DULoxetine (CYMBALTA) 60 MG capsule, Take 1 capsule (60 mg total) by mouth once daily., Disp: 90 capsule, Rfl: 2    esomeprazole (NEXIUM) 40 MG capsule, TAKE ONE CAPSULE BY MOUTH EVERY DAY, Disp: 90 capsule, Rfl: 2    estradioL (ESTRACE) 0.01 % (0.1 mg/gram) vaginal cream, SMARTSI Gram(s) Vaginal, Disp: , Rfl:     flunisolide 25 mcg, 0.025%, (NASALIDE) 25 mcg (0.025 %) Spry, 2 sprays by Nasal route once daily., Disp: 25 mL, Rfl: 5    furosemide (LASIX) 20 MG tablet, Take 20 mg by mouth every other day. , Disp: , Rfl: 3    guaiFENesin (MUCINEX) 600 mg 12 hr tablet, Take 1,200 mg by mouth 2 (two) times daily as needed. , Disp: , Rfl:     ibuprofen (ADVIL,MOTRIN) 800 MG tablet, Take 800 mg by mouth every 6 (six) hours as needed. , Disp: , Rfl:     levocetirizine (XYZAL) 5 MG tablet, Take 1 tablet (5 mg total) by mouth every evening., Disp: 30 tablet, Rfl: 5    metoprolol tartrate (LOPRESSOR) 50 MG tablet, Take 75 mg by mouth 2 (two) times daily. Pt  "taking 1 & a half tablets twice a day., Disp: , Rfl:     nystatin-triamcinolone (MYCOLOG II) cream, SMARTSIG:Sparingly Topical 2-3 Times Daily PRN, Disp: , Rfl:     ondansetron (ZOFRAN-ODT) 4 MG TbDL, Take 1 tablet (4 mg total) by mouth every 8 (eight) hours as needed., Disp: 30 tablet, Rfl: 0    potassium chloride (KLOR-CON) 10 MEQ TbSR, Take 10 mEq by mouth once daily., Disp: , Rfl:     rOPINIRole (REQUIP) 5 MG tablet, Take 1 tablet (5 mg total) by mouth nightly., Disp: 90 tablet, Rfl: 2    tiZANidine (ZANAFLEX) 4 MG tablet, Take 4 mg by mouth every 8 (eight) hours. , Disp: , Rfl: 3    traMADol (ULTRAM) 50 mg tablet, tramadol 50 mg tablet  Take 1 tablet every 6 hours by oral route., Disp: , Rfl:     Physical Examination:  /74 (BP Location: Left arm, Patient Position: Sitting, BP Method: Large (Automatic))   Pulse 77   Resp 18   Ht 5' 3" (1.6 m)   Wt 106.8 kg (235 lb 7.2 oz)   BMI 41.71 kg/m²     GENERAL:  General appearance: Well, non-toxic appearing.  No apparent distress.  Neck: supple.    MENTAL STATUS:  Alertness, attention span & concentration: normal.  Language: normal.  Orientation to self, place & time:  normal.  Memory, recent & remote: normal.  Fund of knowledge: normal.  MMSE:  MOCA: 28/30 (2019)      SPEECH:  Clear and fluent.  Follows complex commands.    CRANIAL NERVES:  Cranial Nerves II-XII were examined.  II - Visual fields: normal.  III, IV, VI: PERRL, EOMI, No ptosis, No nystagmus.  V - Facial sensation: normal.  VII - Face symmetry & mobility: normal.  VIII - Hearing: normal  IX, X - Palate: mobile & midline.  XI - Shoulder shrug: normal.  XII - Tongue protrusion: normal.      GROSS MOTOR:  Gait & station: normal.  Tone: normal.  Abnormal movements: none.  Finger-nose & Heel-knee-shin: normal.  Rapid alternating movements: normal.  Pronator drift: normal  Romberg: absent    MUSCLE STRENGTH:   Hand grasp:   - right:   - left:    Fascics Atrophy RIGHT    LEFT Atrophy Fascics     5 Neck " Ext. 5       5 Neck Flex 5       5 Deltoids 5       5 Sh.Ext.Rot. 5       5 Sh.Int.Rot. 5       5 Biceps 5       5 Triceps 5       5 Forearm.Pr. 5       5 Wrist Ext. 5       5 Wrist Flex 5       5 Finger Ext. 5       5 Finger Flex 5                5 Iliopsoas flex    5       5 Hip Abduct 5       5 Hip Adduct 5       5 Quads 5       5 Hams 5       5 Dorsiflex 5       5 Plantar Flex 5       5 Ankle Jax 5       5 Ankle Invert 5       5 Toe Ext. 5       5 Toe Flex 5                         REFLEXES:    RIGHT Reflex   LEFT   2+ Biceps 2+   2+ Brachiorad. 2+        2+ Patellar 2+     SENSORY:  Light touch: Normal throughout. (LF)  Sharp touch: Normal throughout.  Vibration: Normal throughout. (LF)  Temperature: Normal throughout. (SF)  Joint Position: Normal throughout.  Proprioception: Intact              Diagnostic Data Reviewed:   Component      Latest Ref Rng & Units 8/9/2022 4/6/2022   WBC      3.90 - 12.70 K/uL 4.69    RBC      4.00 - 5.40 M/uL 3.20 (L)    Hemoglobin      12.0 - 16.0 g/dL 10.2 (L)    Hematocrit      37.0 - 48.5 % 30.1 (L)    MCV      82 - 98 fL 94    MCH      27.0 - 31.0 pg 31.9 (H)    MCHC      32.0 - 36.0 g/dL 33.9    RDW      11.5 - 14.5 % 13.2    Platelets      150 - 450 K/uL 168    MPV      9.2 - 12.9 fL 9.7    Immature Granulocytes      0.0 - 0.5 % 0.2    Gran # (ANC)      1.8 - 7.7 K/uL 2.9    Immature Grans (Abs)      0.00 - 0.04 K/uL 0.01    Lymph #      1.0 - 4.8 K/uL 1.2    Mono #      0.3 - 1.0 K/uL 0.4    Eos #      0.0 - 0.5 K/uL 0.2    Baso #      0.00 - 0.20 K/uL 0.02    nRBC      0 /100 WBC 0    Gran %      38.0 - 73.0 % 61.2    Lymph %      18.0 - 48.0 % 25.2    Mono %      4.0 - 15.0 % 8.7    Eosinophil %      0.0 - 8.0 % 4.3    Basophil %      0.0 - 1.9 % 0.4    Differential Method       Automated    Sodium      136 - 145 mmol/L  143   Potassium      3.5 - 5.1 mmol/L  3.9   Chloride      95 - 110 mmol/L  106   CO2      23 - 29 mmol/L  26   Glucose      70 - 110 mg/dL  117 (H)    BUN      8 - 23 mg/dL  15   Creatinine      0.5 - 1.4 mg/dL  1.0   Calcium      8.7 - 10.5 mg/dL  9.3   PROTEIN TOTAL      6.0 - 8.4 g/dL  6.7   Albumin      3.5 - 5.2 g/dL  3.6   BILIRUBIN TOTAL      0.1 - 1.0 mg/dL  0.5   Alkaline Phosphatase      55 - 135 U/L  126   AST      10 - 40 U/L  26   ALT      10 - 44 U/L  22   Anion Gap      8 - 16 mmol/L  11   eGFR if African American      >60 mL/min/1.73 m:2  >60   eGFR if non African American      >60 mL/min/1.73 m:2  56 (A)   Iron      30 - 160 ug/dL 68    TIBC      265 - 497 ug/dL 378    Iron Saturation      20 - 50 % 18 (L)    Angio Convert Enzyme      16 - 85 U/L 125 (H)    CRP      0.00 - 0.90 mg/dL 2.80 (H)        CTH 1/2022:  FINDINGS:  INTRACRANIAL: Stable global volume loss.  Scattered white matter hypodensities, likely related to chronic microvascular ischemic change.  Gray-white differentiation is preserved.  No acute intracranial hemorrhage.  No hydrocephalus.  No intracranial mass effect.     SINUSES: Inspissated mucus in the right sphenoid sinus.  Mastoid air cells are clear.     SKULL/SCALP: Visualized osseous structures are normal.  Midline posterior scalp hematoma.     ORBITS: Bilateral lens replacements.     Impression:     1. No acute intracranial abnormality.  2. Midline posterior scalp hematoma.       MRI brain 4/2019:  IMPRESSION:      1. No acute intracranial abnormality appreciated.  No interval change in the imaging appearance of the brain when compared with the previous MRI of the brain performed 07/24/2018.  2. Age-appropriate cerebral volume loss and probable chronic microvascular ischemic changes within the periventricular white matter of the supratentorial brain.  3. Tiny punctate foci of remote lacunar infarction within the basal ganglia bilaterally and anterior limbs of both internal capsules.  4. Degenerative change of the cervical spine, most pronounced at C4-C5.  5. Ethmoid sinus disease.              Assessment and  Plan:                        Important to note, also  has a past medical history of AC (acromioclavicular) joint bone spurs, Acid reflux (4/14/2011), Adrenal insufficiency, Biceps muscle tear, Bilateral lower extremity edema, Depression, Encounter for blood transfusion (1978), Factor V Leiden, Falls frequently, History of supraventricular tachycardia, Hypertension, IBS (irritable bowel syndrome), Immunocompromised due to corticosteroids (4/12/2016), Long term current use of anticoagulant, Meralgia paresthetica of left side, Osteoarthritis, Osteoporosis, Restless leg syndrome, Rotator cuff injury, S/P ablation operation for arrhythmia, and Sleep apnea.

## 2022-08-25 NOTE — PROGRESS NOTES
Caregiver name: Phu Avalos   Relationship to the patient:    Does the patient have a living will? yes  Does the patient have a designated healthcare POA? yes  Does the patient have a designated general POA? yes    Have educational materials/resources been provided? yes      Activities of Daily Living    Bathing: Needs Help  Dressing: Needs Help  Grooming: Independent  Mouth Care: Independent  Toileting: Needs Help  Transferring Bed/Chair: Needs Help  Walking: Needs Help  Climbing: Cannot Do  Eating: Independent      Instrumental Activities of Daily Living    Shopping: Cannot Do  Cooking: Cannot Do  Managing Medications: Dependent  Using the phone and looking up numbers: Needs Help  Doing Housework: Cannot Do  Doing Laundry: Independent  Driving or using public transportation: Cannot Do  Managing finances: Cannot Do    Functional Assessment Staging  4   Decreased ability to perform complex tasks, e.g. planning dinner for guests, handling personal finances (such as forgetting to pay bills), difficulty marketing, etc.*         Depression Patient Health Questionnaire 8/25/2022   Over the last two weeks how often have you been bothered by little interest or pleasure in doing things Nearly every day   Over the last two weeks how often have you been bothered by feeling down, depressed or hopeless Nearly every day   PHQ-2 Total Score 6   Over the last two weeks how often have you been bothered by trouble falling or staying asleep, or sleeping too much Nearly every day   Over the last two weeks how often have you been bothered by feeling tired or having little energy Nearly every day   Over the last two weeks how often have you been bothered by a poor appetite or overeating Several days   Over the last two weeks how often have you been bothered by feeling bad about yourself - or that you are a failure or have let yourself or your family down Nearly every day   Over the last two weeks how often have you been  bothered by trouble concentrating on things, such as reading the newspaper or watching television Nearly every day   Over the last two weeks how often have you been bothered by moving or speaking so slowly that other people could have noticed. Or the opposite - being so fidgety or restless that you have been moving around a lot more than usual. More than half the days   Over the last two weeks how often have you been bothered by thoughts that you would be better off dead, or of hurting yourself Nearly every day   If you checked off any problems, how difficult have these problems made it for you to do your work, take care of things at home or get along with other people? Very difficult   Total Score 24   Interpretation Severe

## 2022-08-25 NOTE — ASSESSMENT & PLAN NOTE
Patient is a 73 y/o female that presents for ongoing memory complaints. She was last evaluated in 2019 and believes her memory has worsened since then.   She reports difficulty with short term memory and often is repetitious as per family.   There are no reports of significant behavorial changes, hallucinations, sleep disturbances but does endorse depression and currently maintained on Cymbalta which was recently increased as per PCP  MMSE today 29/30   - prev MOCA 28/30  Suspect MCI vs early developing dementia   Recent brain imaging noted  Obtain updated serologies as recent Vitamin B12 level was low  Referral placed to Neuro psych for further eval  KENROY dx likely plays a role as well.    - pt currently with pulmonology and had recent CPAP adjustments.  Continue working with PCP on antidepressant regimen.

## 2022-08-25 NOTE — PROGRESS NOTES
"  NEUROLOGY  Outpatient Follow Up    [facility]  [address] [facility] [address], [place] [address]  [phone] (office) / [phone] (fax)    Patient Name:  Cori Avalos  :  1950  MR #:  [id number]  Acct #:  [id number]    Date of Neurology Visit: 2022  Name of Provider: SIVA Alexandre    Other Physicians:  Arnoldo Miller MD (Primary Care Physician); Self, Aaareferral (Referring)      Chief Complaint: Memory Loss      History of Present Illness (HPI):  Prior HPI from Dr. Loving 2019  "Ms. Avalos is a 69 y.o. female with sarcoidosis (pulmonary and skin) who presents referred by Arnoldo Miller.* today for evaluation of memory loss and imbalance. The patient was accompanied by her daughter and  who also contributed to the following history.      She has had multiple falls in the past year. Her right leg is weak and she was falling, felt like due to L4/5. She had surgery in Oct 2018 and did not do well post operatively. She was on opioids and was delirious afterwards. She then had a UTI and this triggered delirium as well. She was at [facility] for 2 weeks after. She also was deilrious after knee surgery in .      She had a severe fall with a concussion. She last fell on  and mary tot the ER.      Her daughter notes that she has a hard time following conversations. She has trouble finding words. It is more short term things. No confusion with how to cook things, navigate, etc. Her  notes that the things are similar to aging. She has gotten three of her granddaughters confused. She has called the dog by the wrong name. She doesn't feel as rational as she used to be. She feels she has lost some intelligence. She was a teacher (calculus) and type A personality.      She denies any vertigo or imbalance in her head. She is very impulsive as well. She rushes and doesn't take her time.      Gabapentin gives her edema.       The memory has been declining for " ">1 year. She has short term memory trouble and difficulty concentrating. This was worse after her surgery but has not seemed to return to her baseline before surgery.      Her maternal grandmother had Alzheimer's dementia.      She has a history of sarcoidosis with pulmonary and skin involvement. She was previously on steroids and plaquenil but is off. She was on methotrexate. Her daughter noticed a personality change on that and she is off as they were told that she doesn't actually have sarcoidosis.      MRI brain in July 2018 without contrast showed some white matter hyperintensities.      She takes valium nightly for muscle relaxation. She is on amitriptyline and Cymbalta. She was on zanaflex but discontinued."          Interval Hx 8/25/2022:   Patient is new to me            Patient's highest level of education completed was    Level of education  Occupational history  Onset  Short term memory  Long term memory  Personality/behavior/mood  Hallucinations   Sleep/RBD  Executive function (time mngt, organization, starting tasks, planning, focusing, remember instructions, and juggle multiple tasks)  Managing finances/medications  Hygiene/ADLs  Language/comprehension  Vision/object recognition  Incontinence  Gait/falls  Driving/navigating  Medication review   Hobbies/interest    Recheck Vitamin B12          Ms. Avalos is a 69 y.o. female referred to me by Arnoldo Miller* for evaluation of memory loss and unsteadiness. I discussed that in regards to the memory, the MOCA score is reassuringly normal. It is possible she has MCI or a very early developing cognitive decline. With the possible frontal predominant atrophy on CT head and the impulsivity and personality changes, FTD is a possibility. We will obtain updated MRI brain given the word finding difficulties that started after surgery in Oct 2018 to rule out stroke. If this is negative, we can consider PET and can consider neuropsych testing if MOCA score " declines. We will see her back in 6 months with repeat MOCA. We will obtain TSH and B12 testing. She is on vitamin D and last level was normal.      In regards to her falls, she needs to be using a gait aid and be careful. I think her left leg weakness is contributing to her falls. A neurodegenerative disorder like PSP, lewy body, or MSA is possible too but she does not have the classic findings for that. Her CT head does not appear consistent with NPH.      For her hand numbness, this is either CTS (would be recurrent in the right since she has past surgery) or cervical radiculopathy. Will obtain EMG to differentiate.         Past Medical, Surgical, Family & Social History:   Reviewed and updated.    Home Medications:     Current Outpatient Medications:     albuterol (PROVENTIL) 2.5 mg /3 mL (0.083 %) nebulizer solution, , Disp: , Rfl:     albuterol (PROVENTIL/VENTOLIN HFA) 90 mcg/actuation inhaler, Inhale 2 puffs into the lungs every 4 (four) hours as needed for Wheezing or Shortness of Breath., Disp: 18 g, Rfl: 0    apixaban (ELIQUIS) 5 mg Tab, Take 5 mg by mouth 2 (two) times daily., Disp: , Rfl:     azelastine (ASTELIN) 137 mcg (0.1 %) nasal spray, 1 spray (137 mcg total) by Nasal route 2 (two) times daily., Disp: 30 mL, Rfl: 12    benzonatate (TESSALON) 100 MG capsule, Take 100 mg by mouth 3 (three) times daily as needed. , Disp: , Rfl:     budesonide-formoterol 80-4.5 mcg (SYMBICORT) 80-4.5 mcg/actuation HFAA, Inhale 2 puffs into the lungs 2 (two) times a day. Controller, Disp: 1 g, Rfl: 5    CALCIUM 500 500 mg calcium (1,250 mg) tablet, Take 1 tablet by mouth once daily., Disp: , Rfl:     cannabidioL (EPIDIOLEX) 100 mg/mL, Take 300 mg by mouth 2 (two) times daily., Disp: , Rfl:     cholecalciferol, vitamin D3, (VITAMIN D3) 25 mcg (1,000 unit) capsule, Take 1,000 Units by mouth once daily., Disp: , Rfl:     denosumab (PROLIA) 60 mg/mL Syrg, Inject 60 mg into the skin every 6 (six) months. , Disp: ,  Rfl:     diazePAM (VALIUM) 10 MG Tab, Take 10 mg by mouth every evening. , Disp: , Rfl:     diphenhydrAMINE (BENADRYL) 25 mg capsule, Take 25 mg by mouth every 6 (six) hours as needed., Disp: , Rfl:     diphenoxylate-atropine 2.5-0.025 mg (LOMOTIL) 2.5-0.025 mg per tablet, TAKE ONE TABLET BY MOUTH FOUR TIMES DAILY AS NEEDED FOR DIARRHEA, Disp: 30 tablet, Rfl: 0    DULoxetine (CYMBALTA) 30 MG capsule, Take 1 capsule (30 mg total) by mouth once daily., Disp: 90 capsule, Rfl: 1    DULoxetine (CYMBALTA) 60 MG capsule, Take 1 capsule (60 mg total) by mouth once daily., Disp: 90 capsule, Rfl: 2    esomeprazole (NEXIUM) 40 MG capsule, TAKE ONE CAPSULE BY MOUTH EVERY DAY, Disp: 90 capsule, Rfl: 2    estradioL (ESTRACE) 0.01 % (0.1 mg/gram) vaginal cream, SMARTSI Gram(s) Vaginal, Disp: , Rfl:     flunisolide 25 mcg, 0.025%, (NASALIDE) 25 mcg (0.025 %) Spry, 2 sprays by Nasal route once daily., Disp: 25 mL, Rfl: 5    furosemide (LASIX) 20 MG tablet, Take 20 mg by mouth every other day. , Disp: , Rfl: 3    guaiFENesin (MUCINEX) 600 mg 12 hr tablet, Take 1,200 mg by mouth 2 (two) times daily as needed. , Disp: , Rfl:     ibuprofen (ADVIL,MOTRIN) 800 MG tablet, Take 800 mg by mouth every 6 (six) hours as needed. , Disp: , Rfl:     levocetirizine (XYZAL) 5 MG tablet, Take 1 tablet (5 mg total) by mouth every evening., Disp: 30 tablet, Rfl: 5    metoprolol tartrate (LOPRESSOR) 50 MG tablet, Take 75 mg by mouth 2 (two) times daily. Pt taking 1 & a half tablets twice a day., Disp: , Rfl:     nystatin-triamcinolone (MYCOLOG II) cream, SMARTSIG:Sparingly Topical 2-3 Times Daily PRN, Disp: , Rfl:     ondansetron (ZOFRAN-ODT) 4 MG TbDL, Take 1 tablet (4 mg total) by mouth every 8 (eight) hours as needed., Disp: 30 tablet, Rfl: 0    potassium chloride (KLOR-CON) 10 MEQ TbSR, Take 10 mEq by mouth once daily., Disp: , Rfl:     rOPINIRole (REQUIP) 5 MG tablet, Take 1 tablet (5 mg total) by mouth nightly., Disp: 90  "tablet, Rfl: 2    tiZANidine (ZANAFLEX) 4 MG tablet, Take 4 mg by mouth every 8 (eight) hours. , Disp: , Rfl: 3    traMADol (ULTRAM) 50 mg tablet, tramadol 50 mg tablet  Take 1 tablet every 6 hours by oral route., Disp: , Rfl:     Physical Examination:  /74 (BP Location: Left arm, Patient Position: Sitting, BP Method: Large (Automatic))   Pulse 77   Resp 18   Ht 5' 3" (1.6 m)   Wt 106.8 kg (235 lb 7.2 oz)   BMI 41.71 kg/m²     GENERAL:  General appearance: Well, non-toxic appearing.  No apparent distress.  Neck: supple.    MENTAL STATUS:  Alertness, attention span & concentration: normal.  Language: normal.  Orientation to self, place & time:  normal.  Memory, recent & remote: normal.  Fund of knowledge: normal.  MMSE:  MOCA: 28/30 (2019)      SPEECH:  Clear and fluent.  Follows complex commands.    CRANIAL NERVES:  Cranial Nerves II-XII were examined.  II - Visual fields: normal.  III, IV, VI: PERRL, EOMI, No ptosis, No nystagmus.  V - Facial sensation: normal.  VII - Face symmetry & mobility: normal.  VIII - Hearing: normal  IX, X - Palate: mobile & midline.  XI - Shoulder shrug: normal.  XII - Tongue protrusion: normal.      GROSS MOTOR:  Gait & station: normal.  Tone: normal.  Abnormal movements: none.  Finger-nose & Heel-knee-shin: normal.  Rapid alternating movements: normal.  Pronator drift: normal  Romberg: absent    MUSCLE STRENGTH:   Hand grasp:   - right:   - left:    Fascics Atrophy RIGHT    LEFT Atrophy Fascics     5 Neck Ext. 5       5 Neck Flex 5       5 Deltoids 5       5 Sh.Ext.Rot. 5       5 Sh.Int.Rot. 5       5 Biceps 5       5 Triceps 5       5 Forearm.Pr. 5       5 Wrist Ext. 5       5 Wrist Flex 5       5 Finger Ext. 5       5 Finger Flex 5                5 Iliopsoas flex    5       5 Hip Abduct 5       5 Hip Adduct 5       5 Quads 5       5 Hams 5       5 Dorsiflex 5       5 Plantar Flex 5       5 Ankle Jax 5       5 Ankle Invert 5       5 Toe Ext. 5       5 Toe Flex 5          "                REFLEXES:    RIGHT Reflex   LEFT   2+ Biceps 2+   2+ Brachiorad. 2+        2+ Patellar 2+     SENSORY:  Light touch: Normal throughout. (LF)  Sharp touch: Normal throughout.  Vibration: Normal throughout. (LF)  Temperature: Normal throughout. (SF)  Joint Position: Normal throughout.  Proprioception: Intact              Diagnostic Data Reviewed:   Component      Latest Ref Rng & Units 8/9/2022 4/6/2022   WBC      3.90 - 12.70 K/uL 4.69    RBC      4.00 - 5.40 M/uL 3.20 (L)    Hemoglobin      12.0 - 16.0 g/dL 10.2 (L)    Hematocrit      37.0 - 48.5 % 30.1 (L)    MCV      82 - 98 fL 94    MCH      27.0 - 31.0 pg 31.9 (H)    MCHC      32.0 - 36.0 g/dL 33.9    RDW      11.5 - 14.5 % 13.2    Platelets      150 - 450 K/uL 168    MPV      9.2 - 12.9 fL 9.7    Immature Granulocytes      0.0 - 0.5 % 0.2    Gran # (ANC)      1.8 - 7.7 K/uL 2.9    Immature Grans (Abs)      0.00 - 0.04 K/uL 0.01    Lymph #      1.0 - 4.8 K/uL 1.2    Mono #      0.3 - 1.0 K/uL 0.4    Eos #      0.0 - 0.5 K/uL 0.2    Baso #      0.00 - 0.20 K/uL 0.02    nRBC      0 /100 WBC 0    Gran %      38.0 - 73.0 % 61.2    Lymph %      18.0 - 48.0 % 25.2    Mono %      4.0 - 15.0 % 8.7    Eosinophil %      0.0 - 8.0 % 4.3    Basophil %      0.0 - 1.9 % 0.4    Differential Method       Automated    Sodium      136 - 145 mmol/L  143   Potassium      3.5 - 5.1 mmol/L  3.9   Chloride      95 - 110 mmol/L  106   CO2      23 - 29 mmol/L  26   Glucose      70 - 110 mg/dL  117 (H)   BUN      8 - 23 mg/dL  15   Creatinine      0.5 - 1.4 mg/dL  1.0   Calcium      8.7 - 10.5 mg/dL  9.3   PROTEIN TOTAL      6.0 - 8.4 g/dL  6.7   Albumin      3.5 - 5.2 g/dL  3.6   BILIRUBIN TOTAL      0.1 - 1.0 mg/dL  0.5   Alkaline Phosphatase      55 - 135 U/L  126   AST      10 - 40 U/L  26   ALT      10 - 44 U/L  22   Anion Gap      8 - 16 mmol/L  11   eGFR if African American      >60 mL/min/1.73 m:2  >60   eGFR if non African American      >60 mL/min/1.73 m:2  56 (A)    Iron      30 - 160 ug/dL 68    TIBC      265 - 497 ug/dL 378    Iron Saturation      20 - 50 % 18 (L)    Angio Convert Enzyme      16 - 85 U/L 125 (H)    CRP      0.00 - 0.90 mg/dL 2.80 (H)        CTH 1/2022:  FINDINGS:  INTRACRANIAL: Stable global volume loss.  Scattered white matter hypodensities, likely related to chronic microvascular ischemic change.  Gray-white differentiation is preserved.  No acute intracranial hemorrhage.  No hydrocephalus.  No intracranial mass effect.     SINUSES: Inspissated mucus in the right sphenoid sinus.  Mastoid air cells are clear.     SKULL/SCALP: Visualized osseous structures are normal.  Midline posterior scalp hematoma.     ORBITS: Bilateral lens replacements.     Impression:     1. No acute intracranial abnormality.  2. Midline posterior scalp hematoma.       MRI brain 4/2019:  IMPRESSION:      1. No acute intracranial abnormality appreciated.  No interval change in the imaging appearance of the brain when compared with the previous MRI of the brain performed 07/24/2018.  2. Age-appropriate cerebral volume loss and probable chronic microvascular ischemic changes within the periventricular white matter of the supratentorial brain.  3. Tiny punctate foci of remote lacunar infarction within the basal ganglia bilaterally and anterior limbs of both internal capsules.  4. Degenerative change of the cervical spine, most pronounced at C4-C5.  5. Ethmoid sinus disease.              Assessment and Plan:                        Important to note, also  has a past medical history of AC (acromioclavicular) joint bone spurs, Acid reflux (4/14/2011), Adrenal insufficiency, Biceps muscle tear, Bilateral lower extremity edema, Depression, Encounter for blood transfusion (1978), Factor V Leiden, Falls frequently, History of supraventricular tachycardia, Hypertension, IBS (irritable bowel syndrome), Immunocompromised due to corticosteroids (4/12/2016), Long term current use of anticoagulant,  Meralgia paresthetica of left side, Osteoarthritis, Osteoporosis, Restless leg syndrome, Rotator cuff injury, S/P ablation operation for arrhythmia, and Sleep apnea.          The patient will return to clinic in *** months.    All questions were answered and patient is comfortable with the plan.         Thank you very much for the opportunity to assist in this patient's care.    If you have any questions or concerns, please do not hesitate to contact me at any time.      Sincerely,     SIVA Alexandre  [facility] [place]         I spent a total of *** minutes on the day of the visit.This includes face to face time and non-face to face time preparing to see the patient (eg, review of tests), Obtaining and/or reviewing separately obtained history, Documenting clinical information in the electronic or other health record, Independently interpreting resultsand communicating results to the patient/family/caregiver, or Care coordination.

## 2022-08-26 LAB
AMMONIA PLAS-SCNC: 23 UMOL/L (ref 10–50)
FOLATE SERPL-MCNC: 11.8 NG/ML (ref 4–24)
RPR SER QL: NORMAL
VIT B12 SERPL-MCNC: 282 PG/ML (ref 210–950)

## 2022-08-26 PROCEDURE — G0180 MD CERTIFICATION HHA PATIENT: HCPCS | Mod: ,,, | Performed by: INTERNAL MEDICINE

## 2022-08-26 PROCEDURE — G0180 PR HOME HEALTH MD CERTIFICATION: ICD-10-PCS | Mod: ,,, | Performed by: INTERNAL MEDICINE

## 2022-08-30 LAB — VIT B1 BLD-MCNC: 55 UG/L (ref 38–122)

## 2022-09-01 ENCOUNTER — TELEPHONE (OUTPATIENT)
Dept: FAMILY MEDICINE | Facility: CLINIC | Age: 72
End: 2022-09-01
Payer: MEDICARE

## 2022-09-01 NOTE — TELEPHONE ENCOUNTER
----- Message from Joey Rojas sent at 8/31/2022  3:55 PM CDT -----       Type: Patient Returning Call    Who Called: Patient   Who Left Message for Patient: NA  Does the patient know what this is regarding?: Patient says she needs a call back soon.   Would the patient rather a call back or a response via [facility]? Call   Best Call Back Number: [phone]  Additional Information: Please assist, thank you!

## 2022-09-07 ENCOUNTER — OFFICE VISIT (OUTPATIENT)
Dept: UROLOGY | Facility: CLINIC | Age: 72
End: 2022-09-07
Payer: MEDICARE

## 2022-09-07 ENCOUNTER — LAB VISIT (OUTPATIENT)
Dept: LAB | Facility: HOSPITAL | Age: 72
End: 2022-09-07
Attending: STUDENT IN AN ORGANIZED HEALTH CARE EDUCATION/TRAINING PROGRAM
Payer: MEDICARE

## 2022-09-07 DIAGNOSIS — N39.0 RECURRENT UTI: Primary | ICD-10-CM

## 2022-09-07 DIAGNOSIS — N39.3 SUI (STRESS URINARY INCONTINENCE, FEMALE): ICD-10-CM

## 2022-09-07 DIAGNOSIS — N39.0 RECURRENT UTI: ICD-10-CM

## 2022-09-07 LAB
BILIRUB SERPL-MCNC: NORMAL MG/DL
BLOOD URINE, POC: NORMAL
CLARITY, POC UA: CLEAR
COLOR, POC UA: YELLOW
CREAT SERPL-MCNC: 0.9 MG/DL (ref 0.5–1.4)
EST. GFR  (NO RACE VARIABLE): >60 ML/MIN/1.73 M^2
GLUCOSE UR QL STRIP: NORMAL
KETONES UR QL STRIP: NORMAL
LEUKOCYTE ESTERASE URINE, POC: NORMAL
NITRITE, POC UA: NORMAL
PH, POC UA: 5.5
PROTEIN, POC: NORMAL
SPECIFIC GRAVITY, POC UA: 1.01
UROBILINOGEN, POC UA: 0.2

## 2022-09-07 PROCEDURE — 1159F MED LIST DOCD IN RCRD: CPT | Mod: CPTII,S$GLB,, | Performed by: STUDENT IN AN ORGANIZED HEALTH CARE EDUCATION/TRAINING PROGRAM

## 2022-09-07 PROCEDURE — 36415 COLL VENOUS BLD VENIPUNCTURE: CPT | Mod: PO | Performed by: STUDENT IN AN ORGANIZED HEALTH CARE EDUCATION/TRAINING PROGRAM

## 2022-09-07 PROCEDURE — 1100F PTFALLS ASSESS-DOCD GE2>/YR: CPT | Mod: CPTII,S$GLB,, | Performed by: STUDENT IN AN ORGANIZED HEALTH CARE EDUCATION/TRAINING PROGRAM

## 2022-09-07 PROCEDURE — 99999 PR PBB SHADOW E&M-EST. PATIENT-LVL IV: ICD-10-PCS | Mod: PBBFAC,,, | Performed by: STUDENT IN AN ORGANIZED HEALTH CARE EDUCATION/TRAINING PROGRAM

## 2022-09-07 PROCEDURE — 1160F PR REVIEW ALL MEDS BY PRESCRIBER/CLIN PHARMACIST DOCUMENTED: ICD-10-PCS | Mod: CPTII,S$GLB,, | Performed by: STUDENT IN AN ORGANIZED HEALTH CARE EDUCATION/TRAINING PROGRAM

## 2022-09-07 PROCEDURE — 1100F PR PT FALLS ASSESS DOC 2+ FALLS/FALL W/INJURY/YR: ICD-10-PCS | Mod: CPTII,S$GLB,, | Performed by: STUDENT IN AN ORGANIZED HEALTH CARE EDUCATION/TRAINING PROGRAM

## 2022-09-07 PROCEDURE — 82565 ASSAY OF CREATININE: CPT | Performed by: STUDENT IN AN ORGANIZED HEALTH CARE EDUCATION/TRAINING PROGRAM

## 2022-09-07 PROCEDURE — 3288F FALL RISK ASSESSMENT DOCD: CPT | Mod: CPTII,S$GLB,, | Performed by: STUDENT IN AN ORGANIZED HEALTH CARE EDUCATION/TRAINING PROGRAM

## 2022-09-07 PROCEDURE — 3288F PR FALLS RISK ASSESSMENT DOCUMENTED: ICD-10-PCS | Mod: CPTII,S$GLB,, | Performed by: STUDENT IN AN ORGANIZED HEALTH CARE EDUCATION/TRAINING PROGRAM

## 2022-09-07 PROCEDURE — 1157F ADVNC CARE PLAN IN RCRD: CPT | Mod: CPTII,S$GLB,, | Performed by: STUDENT IN AN ORGANIZED HEALTH CARE EDUCATION/TRAINING PROGRAM

## 2022-09-07 PROCEDURE — 81002 POCT URINE DIPSTICK WITHOUT MICROSCOPE: ICD-10-PCS | Mod: S$GLB,,, | Performed by: STUDENT IN AN ORGANIZED HEALTH CARE EDUCATION/TRAINING PROGRAM

## 2022-09-07 PROCEDURE — 1157F PR ADVANCE CARE PLAN OR EQUIV PRESENT IN MEDICAL RECORD: ICD-10-PCS | Mod: CPTII,S$GLB,, | Performed by: STUDENT IN AN ORGANIZED HEALTH CARE EDUCATION/TRAINING PROGRAM

## 2022-09-07 PROCEDURE — 1126F AMNT PAIN NOTED NONE PRSNT: CPT | Mod: CPTII,S$GLB,, | Performed by: STUDENT IN AN ORGANIZED HEALTH CARE EDUCATION/TRAINING PROGRAM

## 2022-09-07 PROCEDURE — 99999 PR PBB SHADOW E&M-EST. PATIENT-LVL IV: CPT | Mod: PBBFAC,,, | Performed by: STUDENT IN AN ORGANIZED HEALTH CARE EDUCATION/TRAINING PROGRAM

## 2022-09-07 PROCEDURE — 1159F PR MEDICATION LIST DOCUMENTED IN MEDICAL RECORD: ICD-10-PCS | Mod: CPTII,S$GLB,, | Performed by: STUDENT IN AN ORGANIZED HEALTH CARE EDUCATION/TRAINING PROGRAM

## 2022-09-07 PROCEDURE — 99204 PR OFFICE/OUTPT VISIT, NEW, LEVL IV, 45-59 MIN: ICD-10-PCS | Mod: S$GLB,,, | Performed by: STUDENT IN AN ORGANIZED HEALTH CARE EDUCATION/TRAINING PROGRAM

## 2022-09-07 PROCEDURE — 1160F RVW MEDS BY RX/DR IN RCRD: CPT | Mod: CPTII,S$GLB,, | Performed by: STUDENT IN AN ORGANIZED HEALTH CARE EDUCATION/TRAINING PROGRAM

## 2022-09-07 PROCEDURE — 81002 URINALYSIS NONAUTO W/O SCOPE: CPT | Mod: S$GLB,,, | Performed by: STUDENT IN AN ORGANIZED HEALTH CARE EDUCATION/TRAINING PROGRAM

## 2022-09-07 PROCEDURE — 1126F PR PAIN SEVERITY QUANTIFIED, NO PAIN PRESENT: ICD-10-PCS | Mod: CPTII,S$GLB,, | Performed by: STUDENT IN AN ORGANIZED HEALTH CARE EDUCATION/TRAINING PROGRAM

## 2022-09-07 PROCEDURE — 99204 OFFICE O/P NEW MOD 45 MIN: CPT | Mod: S$GLB,,, | Performed by: STUDENT IN AN ORGANIZED HEALTH CARE EDUCATION/TRAINING PROGRAM

## 2022-09-07 NOTE — PROGRESS NOTES
"[facility]   Clinic Note    SUBJECTIVE:     Chief Complaint: Other (Recurrent uti)    History of Present Illness:  Cori Avalos is a 72 y.o. female who presents to clinic for recurrent UTIs. She is new to our clinic.  Referral from Dr. Arnoldo Godwin.    She has been diagnosed with recurrent UTIs by her PCP and gynecologist. Reports onset of episodes over the past years. Reports 3-4 in the past 6 months. She reports symptoms of suprapubic pressure with urgency, occasional low grade fevers and mild altered mental status. She has been treated with macrobid. Symptoms improve with antibiotics.   There a no recent cultures available for review.     Reports history of kidney stones - passed spontaneously.    She reports stress urinary incontinence with coughing and sneezing. She has occasional urgency but has no UUI. She reports hesitancy. She has a sensation of incomplete bladder emptying.     Reports excision of a urethral caruncle about 15 years ago.     She denies constipation, reports more diarrhea. She is on fiber supplements and has been doing better.     Anticoagulation/Antiplatelets:  Yes, eliquis    OBJECTIVE:     Estimated body mass index is 41.71 kg/m² as calculated from the following:    Height as of 8/25/22: 5' 3" (1.6 m).    Weight as of 8/25/22: 106.8 kg (235 lb 7.2 oz).    Vital Signs (Most Recent)       Physical Exam  Vitals and nursing note reviewed.   Constitutional:       General: She is not in acute distress.     Appearance: She is well-developed.   Cardiovascular:      Rate and Rhythm: Normal rate.   Pulmonary:      Effort: Pulmonary effort is normal. No accessory muscle usage or respiratory distress.   Abdominal:      General: There is no distension.      Palpations: Abdomen is soft.      Tenderness: There is no abdominal tenderness.   Neurological:      Mental Status: She is alert and oriented to person, place, and time.       Lab Results   Component Value Date    BUN 15 04/06/2022 "    CREATININE 1.0 04/06/2022    WBC 4.69 08/09/2022    HGB 10.2 (L) 08/09/2022    HCT 30.1 (L) 08/09/2022     08/09/2022    AST 26 04/06/2022    ALT 22 04/06/2022    ALKPHOS 126 04/06/2022    ALBUMIN 3.6 04/06/2022    HGBA1C 5.2 04/06/2021      Urine dip showed no blood, leukocyte esterase, and nitrite. Negative protein.     ASSESSMENT     1. Recurrent UTI    2. NAKITA (stress urinary incontinence, female)      PLAN:   1. Recurrent UTI  -     POCT URINE DIPSTICK WITHOUT MICROSCOPE  -     CT Urogram Abd Pelvis W WO  -     Cystoscopy  -     Creatinine, serum    2. NAKITA (stress urinary incontinence, female)     Behavioral modifications and interventions to help with urinary issues include limiting intake of the following: fluids prior to bedtime or travel; mild diuretics, such as caffeine and alcohol; and bladder irritants, such as highly seasoned or irritative foods. Other interventions include avoiding constipation, increasing physical activity, weight loss, Kegel exercises at time of urinary urgency, timed voiding regimens, and double-voiding techniques.     Discussed other behavioral modification to prevent urinary tract infections including increased fluid intake, cranberry supplementation, and probiotic usage.    Discussed recurrent UTIs, etiologies and management. Given the history of stones and previous urology procedures, plan for complete workup with CT urogram and cystoscopy    Jhonatan Del Valle II, MD     Letter to Arnoldo Miller.*

## 2022-09-07 NOTE — PATIENT INSTRUCTIONS
Behavioral modifications and interventions to help with urinary issues include limiting intake of the following: fluids prior to bedtime or travel; mild diuretics, such as caffeine and alcohol; and bladder irritants, such as highly seasoned or irritative foods. Other interventions include avoiding constipation, increasing physical activity, weight loss, Kegel exercises at time of urinary urgency, timed voiding regimens, and double-voiding techniques.

## 2022-09-13 ENCOUNTER — EXTERNAL HOME HEALTH (OUTPATIENT)
Dept: HOME HEALTH SERVICES | Facility: HOSPITAL | Age: 72
End: 2022-09-13
Payer: MEDICARE

## 2022-09-13 ENCOUNTER — PROCEDURE VISIT (OUTPATIENT)
Dept: UROLOGY | Facility: CLINIC | Age: 72
End: 2022-09-13
Payer: MEDICARE

## 2022-09-13 VITALS — HEIGHT: 63 IN | WEIGHT: 235.44 LBS | BODY MASS INDEX: 41.71 KG/M2

## 2022-09-13 DIAGNOSIS — N39.0 RECURRENT UTI: ICD-10-CM

## 2022-09-13 LAB
BILIRUB SERPL-MCNC: NORMAL MG/DL
BLOOD URINE, POC: NORMAL
CLARITY, POC UA: CLEAR
COLOR, POC UA: YELLOW
GLUCOSE UR QL STRIP: NORMAL
KETONES UR QL STRIP: NORMAL
LEUKOCYTE ESTERASE URINE, POC: NORMAL
NITRITE, POC UA: NORMAL
PH, POC UA: 6
PROTEIN, POC: NORMAL
SPECIFIC GRAVITY, POC UA: 1.02
UROBILINOGEN, POC UA: 0.2

## 2022-09-13 PROCEDURE — 52000 CYSTOURETHROSCOPY: CPT | Mod: S$GLB,,, | Performed by: STUDENT IN AN ORGANIZED HEALTH CARE EDUCATION/TRAINING PROGRAM

## 2022-09-13 PROCEDURE — 81002 URINALYSIS NONAUTO W/O SCOPE: CPT | Mod: S$GLB,,, | Performed by: STUDENT IN AN ORGANIZED HEALTH CARE EDUCATION/TRAINING PROGRAM

## 2022-09-13 PROCEDURE — 81002 POCT URINE DIPSTICK WITHOUT MICROSCOPE: ICD-10-PCS | Mod: S$GLB,,, | Performed by: STUDENT IN AN ORGANIZED HEALTH CARE EDUCATION/TRAINING PROGRAM

## 2022-09-13 PROCEDURE — 52000 CYSTOSCOPY: ICD-10-PCS | Mod: S$GLB,,, | Performed by: STUDENT IN AN ORGANIZED HEALTH CARE EDUCATION/TRAINING PROGRAM

## 2022-09-13 NOTE — PATIENT INSTRUCTIONS
Discussed options for her recurrent UTIs.   General recommendations include increasing fluid intake to 2-3 L of water per day and avoiding constipation.  Over the counter options include:   D-Mannose 2000 U once a day, or 1000 U twice a day may be used as prophylaxis and may be particularly useful for E coli specific UTIs. There is mixed evidence to support its use but there is overall low risk to this option.   Probiotics with at least 20 billion CFU or foods that improve gut anni such as yogurt or other fermented foods. There is mixed evidence to support its use and further evidence is required in the future.  Cranberry may be offered as prophylaxis including oral juice and tablet formulations as there is not sufficient evidence to support one formulation over another. In addition, there is little risk to cranberry supplements, further increasing their appeal to patients. However, it must be noted that fruit juices can be high in sugar content, which limits its use in diabetic patients.  Prophylactic antibiotics are rarely recommended but can be used as a last resort or for post-coital prophylaxis

## 2022-09-14 ENCOUNTER — HOSPITAL ENCOUNTER (OUTPATIENT)
Dept: RADIOLOGY | Facility: CLINIC | Age: 72
Discharge: HOME | End: 2022-09-14
Attending: STUDENT IN AN ORGANIZED HEALTH CARE EDUCATION/TRAINING PROGRAM
Payer: MEDICARE

## 2022-09-14 DIAGNOSIS — N39.0 RECURRENT UTI: ICD-10-CM

## 2022-09-14 PROCEDURE — 25500020 PHARM REV CODE 255: Mod: PN | Performed by: STUDENT IN AN ORGANIZED HEALTH CARE EDUCATION/TRAINING PROGRAM

## 2022-09-14 PROCEDURE — 74178 CT ABD&PLV WO CNTR FLWD CNTR: CPT | Mod: 26,,, | Performed by: RADIOLOGY

## 2022-09-14 PROCEDURE — 74178 CT UROGRAM ABD PELVIS W WO: ICD-10-PCS | Mod: 26,,, | Performed by: RADIOLOGY

## 2022-09-14 PROCEDURE — 74178 CT ABD&PLV WO CNTR FLWD CNTR: CPT | Mod: TC,PN

## 2022-09-14 RX ADMIN — IOHEXOL 125 ML: 350 INJECTION, SOLUTION INTRAVENOUS at 12:09

## 2022-09-21 ENCOUNTER — TELEPHONE (OUTPATIENT)
Dept: FAMILY MEDICINE | Facility: CLINIC | Age: 72
End: 2022-09-21
Payer: MEDICARE

## 2022-09-21 NOTE — TELEPHONE ENCOUNTER
----- Message from Jean-Pierre Vang sent at 9/21/2022  3:52 PM CDT -----  Contact: Patient  Type:  Patient Call          Who Called: Patient         Does the patient know what this is regarding?: requesting a call back with questions about seeing the Dr before having shots ;  also if she need to keep the appt on 10/20 please advise           Would the patient rather a call back or a response via [facility]?both           Best Call Back Number:[phone]             Additional Information:        
Spoke with patient and she was confirming appt for her and her  on 10/20/22.   
details…

## 2022-09-23 ENCOUNTER — DOCUMENT SCAN (OUTPATIENT)
Dept: HOME HEALTH SERVICES | Facility: HOSPITAL | Age: 72
End: 2022-09-23
Payer: MEDICARE

## 2022-09-28 ENCOUNTER — TELEPHONE (OUTPATIENT)
Dept: HEMATOLOGY/ONCOLOGY | Facility: CLINIC | Age: 72
End: 2022-09-28
Payer: MEDICARE

## 2022-09-28 ENCOUNTER — TELEPHONE (OUTPATIENT)
Dept: INFUSION THERAPY | Facility: HOSPITAL | Age: 72
End: 2022-09-28
Payer: MEDICARE

## 2022-09-28 NOTE — TELEPHONE ENCOUNTER
----- Message from Alejandra Keyes sent at 9/28/2022  8:17 AM CDT -----  Type: Needs Medical Advice  Who Called:  Patient   Symptoms (please be specific):    How long has patient had these symptoms:    Pharmacy name and phone #:    Best Call Back Number: [phone]  Additional Information: Patient is requesting a call back to have infusion orders on 10/7 at 1:00 moved from  to the [facility].

## 2022-09-29 ENCOUNTER — TELEPHONE (OUTPATIENT)
Dept: INFUSION THERAPY | Facility: HOSPITAL | Age: 72
End: 2022-09-29
Payer: MEDICARE

## 2022-09-29 NOTE — TELEPHONE ENCOUNTER
----- Message from Tameka Escudero sent at 9/28/2022 10:34 AM CDT -----  Contact: [phone]  Patient would like to consult with a nurse in regards to rescheduling her infusion. Please give her a call back at [phone]. Thanks r/s

## 2022-09-29 NOTE — TELEPHONE ENCOUNTER
----- Message from Alejandra Keyes sent at 9/29/2022 10:23 AM CDT -----  Type: Needs Medical Advice  Who Called:  Patient   Symptoms (please be specific):    How long has patient had these symptoms:    Pharmacy name and phone #:    Best Call Back Number: [phone]  Additional Information: Patient is requesting a call back to have her infusion on 10/7 at 1:00 in [place] transferred to this location

## 2022-09-30 ENCOUNTER — LAB VISIT (OUTPATIENT)
Dept: LAB | Facility: HOSPITAL | Age: 72
End: 2022-09-30
Payer: MEDICARE

## 2022-09-30 ENCOUNTER — TELEPHONE (OUTPATIENT)
Dept: RHEUMATOLOGY | Facility: CLINIC | Age: 72
End: 2022-09-30
Payer: MEDICARE

## 2022-09-30 DIAGNOSIS — M81.0 OSTEOPOROSIS, POSTMENOPAUSAL: ICD-10-CM

## 2022-09-30 DIAGNOSIS — Z51.81 MEDICATION MONITORING ENCOUNTER: ICD-10-CM

## 2022-09-30 LAB
ALBUMIN SERPL BCP-MCNC: 3.6 G/DL (ref 3.5–5.2)
ALP SERPL-CCNC: 104 U/L (ref 55–135)
ALT SERPL W/O P-5'-P-CCNC: 17 U/L (ref 10–44)
ANION GAP SERPL CALC-SCNC: 11 MMOL/L (ref 8–16)
AST SERPL-CCNC: 14 U/L (ref 10–40)
BILIRUB SERPL-MCNC: 0.5 MG/DL (ref 0.1–1)
BUN SERPL-MCNC: 24 MG/DL (ref 8–23)
CALCIUM SERPL-MCNC: 9.1 MG/DL (ref 8.7–10.5)
CHLORIDE SERPL-SCNC: 109 MMOL/L (ref 95–110)
CO2 SERPL-SCNC: 23 MMOL/L (ref 23–29)
CREAT SERPL-MCNC: 0.9 MG/DL (ref 0.5–1.4)
EST. GFR  (NO RACE VARIABLE): >60 ML/MIN/1.73 M^2
GLUCOSE SERPL-MCNC: 98 MG/DL (ref 70–110)
POTASSIUM SERPL-SCNC: 3.3 MMOL/L (ref 3.5–5.1)
PROT SERPL-MCNC: 6.6 G/DL (ref 6–8.4)
SODIUM SERPL-SCNC: 143 MMOL/L (ref 136–145)

## 2022-09-30 PROCEDURE — 36415 COLL VENOUS BLD VENIPUNCTURE: CPT | Mod: PO

## 2022-09-30 PROCEDURE — 80053 COMPREHEN METABOLIC PANEL: CPT | Mod: PO

## 2022-09-30 NOTE — TELEPHONE ENCOUNTER
----- Message from Petrona Maradiaga MA sent at 9/30/2022  7:30 AM CDT -----  Patient would like her prolia in [place].  Please contact patient.

## 2022-09-30 NOTE — TELEPHONE ENCOUNTER
Patient wanted to schedule her prolia in [place]. Expressed to patient that she needs to see the provider same day as prolia. Expressed to patient that I cannot schedule prolia in [place]. Patient is keeping appt with Brandy. Patient verbalized understanding and was grateful for the call-DD

## 2022-10-03 ENCOUNTER — TELEPHONE (OUTPATIENT)
Dept: RHEUMATOLOGY | Facility: CLINIC | Age: 72
End: 2022-10-03
Payer: MEDICARE

## 2022-10-03 NOTE — TELEPHONE ENCOUNTER
----- Message from Brandy Bernal PA-C sent at 10/2/2022 10:05 PM CDT -----  Regarding: RE: appt  Oct 11 or 12 at 3 pm  ----- Message -----  From: Lyubov Orellana MA  Sent: 9/30/2022   1:45 PM CDT  To: Brandy Bernal PA-C  Subject: FW: appt                                         She is a prolia pt. Where would you like to squeeze her on the scheduled. Pt unable to attend any appts on thrus or fridays.        ----- Message -----  From: Diane Rubio MA  Sent: 9/30/2022  10:29 AM CDT  To: Lyubov Orellana MA  Subject: appt                                             Good morning,     I tried to look for openings but there were none. Patient is needing to r/s her labs, appt with Brandy and prolia appt.       ----- Message -----  From: Ginger Alvarado MA  Sent: 9/30/2022  10:17 AM CDT  To: [facility] Clinical Staff    Good morning. Pt is needing to reschedule her apt w chari on 10/07. She is unable to attend any apts on thurs or fridays. Whenever she is rescheduled pls lmk so I can reschedule her prolia and call her w updated apt days / times.  Thank you,  Ginger

## 2022-10-10 NOTE — PROGRESS NOTES
RHEUMATOLOGY OUTPATIENT CLINIC NOTE    10/11/2022    Subjective:       Patient ID: Cori Avalos is a 72 y.o. female.    Chief Complaint: Osteoporosis    HPI    Cori Avalos is a 72 y.o. pleasant female here for rheumatology follow up for osteoporosis and osteoarthritis.  Tolerating Prolia infusions well, no falls or fractures since last appointment.       She does have occasional right shoulder and right knee pain.      She denies any swelling of any joints, redness, increased warmth any joints.   No upcoming planned invasive dental work.  She is on a CPAP at nighttime.  No other shortness of breath.  Rheumatologic review of systems negative otherwise.      Physical exam with tenderness to palpation right shoulder and decreased range of motion less than 90° with passive and active range of motion.  Tenderness to palpation right knee.  Walks with Rollator.  Able to rise from chair without assistance.    Past Medical History:   Diagnosis Date    AC (acromioclavicular) joint bone spurs     Acid reflux 4/14/2011    Adrenal insufficiency     Biceps muscle tear     Right    Bilateral lower extremity edema     Depression     Encounter for blood transfusion 1978    Factor V Leiden     Falls frequently     History of supraventricular tachycardia     Hypertension     IBS (irritable bowel syndrome)     Immunocompromised due to corticosteroids 4/12/2016    Long term current use of anticoagulant     Meralgia paresthetica of left side     Osteoarthritis     Osteoporosis     Restless leg syndrome     Rotator cuff injury     Right    S/P ablation operation for arrhythmia     Sleep apnea     cpap     Past Surgical History:   Procedure Laterality Date    APPENDECTOMY      BACK SURGERY  2013 and 2018    CARDIAC ELECTROPHYSIOLOGY STUDY AND ABLATION      CARPAL TUNNEL RELEASE Right     CHOLECYSTECTOMY      COLECTOMY      ECTOPIC PREGNANCY SURGERY      EYE SURGERY Bilateral     cataracts with iol    HERNIA REPAIR       "Umbilical    INSERTION OF IMPLANTABLE LOOP RECORDER N/A 4/27/2021    Procedure: INSERTION, IMPLANTABLE LOOP RECORDER;  Surgeon: Melo Colin MD;  Location: [facility];  Service: Cardiology;  Laterality: N/A;    JOINT REPLACEMENT Left     Knee    SHOULDER SURGERY Right     Dr Guevara     sinus cleaning      TONSILLECTOMY      TUBAL LIGATION      uterine suspension      VEIN LIGATION AND STRIPPING Bilateral      Family History   Problem Relation Age of Onset    Cancer Mother     Breast cancer Mother 65    Heart disease Father     Stroke Father     Hypertension Father     Hypertension Sister     Diabetes Daughter     Alzheimer's disease Maternal Grandmother     Breast cancer Maternal Grandmother         age unknown     Social History     Socioeconomic History    Marital status:      Spouse name: william    Occupational History    Occupation: retired     Tobacco Use    Smoking status: Never    Smokeless tobacco: Never   Substance and Sexual Activity    Alcohol use: No    Drug use: No    Sexual activity: Not Currently     Partners: Male   Social History Narrative    ** Merged History Encounter **          Review of patient's allergies indicates:   Allergen Reactions    Cyclobenzaprine Hallucinations    Gabapentin Edema and Other (See Comments)    Opioids - morphine analogues Anxiety and Hallucinations    Penicillins Hives     Other reaction(s): Unknown    Ceftin [cefuroxime axetil] Nausea And Vomiting    Morphine     Neurontin [gabapentin] Swelling    Opioids-meperidine and related Other (See Comments)     AMS    Oxycodone     Oxycodone-acetaminophen Hallucinations    Ceftin [cefuroxime axetil] Rash    Penicillins Rash           Objective:   /84   Pulse 65   Resp 16   Ht 5' 3" (1.6 m)   Wt 106.8 kg (235 lb 7.2 oz)   BMI 41.71 kg/m²   Immunization History   Administered Date(s) Administered    COVID-19, MRNA, LN-S, PF (Pfizer) (Gray Cap) 05/06/2022    COVID-19, MRNA, LN-S, PF (Pfizer) (Purple " Cap) 01/08/2021, 01/29/2021, 10/01/2021    Influenza 10/18/2008, 10/10/2011, 10/02/2012, 09/18/2018    Influenza - High Dose - PF (65 years and older) 11/12/2015, 10/04/2016, 09/13/2018, 09/10/2019, 10/25/2021    Influenza - Quadrivalent - High Dose - PF (65 years and older) 09/23/2020    Influenza - Quadrivalent - PF *Preferred* (6 months and older) 09/21/2017, 09/13/2018    Influenza - Trivalent (ADULT) 10/18/2008, 10/10/2011, 10/02/2012    Pneumococcal Conjugate - 13 Valent 01/01/2009    Pneumococcal Polysaccharide - 23 Valent 07/07/2014    Tdap 02/24/2014, 09/01/2015    Zoster 10/14/2013          Recent Results (from the past 672 hour(s))   Comprehensive Metabolic Panel    Collection Time: 09/30/22 12:12 PM   Result Value Ref Range    Sodium 143 136 - 145 mmol/L    Potassium 3.3 (L) 3.5 - 5.1 mmol/L    Chloride 109 95 - 110 mmol/L    CO2 23 23 - 29 mmol/L    Glucose 98 70 - 110 mg/dL    BUN 24 (H) 8 - 23 mg/dL    Creatinine 0.9 0.5 - 1.4 mg/dL    Calcium 9.1 8.7 - 10.5 mg/dL    Total Protein 6.6 6.0 - 8.4 g/dL    Albumin 3.6 3.5 - 5.2 g/dL    Total Bilirubin 0.5 0.1 - 1.0 mg/dL    Alkaline Phosphatase 104 55 - 135 U/L    AST 14 10 - 40 U/L    ALT 17 10 - 44 U/L    Anion Gap 11 8 - 16 mmol/L    eGFR >60 >60 mL/min/1.73 m^2        No results found for: TBGOLDPLUS   Lab Results   Component Value Date    HEPAIGM Negative 11/19/2013    HEPBIGM Negative 11/19/2013    HEPCAB Negative 04/11/2017      DEXA 04/01/2022:  L1-L3 -0.2, FN-2.2, TH-1.3, 28.0% increase in spine,-6.2% decline in hip 18.2% major FRAX, hip FRAX 4.2%    Assessment:       1. Osteoporosis, postmenopausal    2. Compression fx, thoracic spine, sequela    3. Primary osteoarthritis of both knees    4. Rotator cuff tear arthropathy of right shoulder    5. Spondylosis of lumbar region without myelopathy or radiculopathy    6. Sarcoid    7. Factor V Leiden    8. Mass of skin            Impression:     Osteoporosis: tx with prolia since at least 2013 w  improved bmd; h/o vertebral fxs a R shoulder fx, taking daily vit D no ca 1000mg/day supplement   DEXA April 2022 increase in Spine (arthritis vs sx?), stable at hip.  Osteopenia scores.  Reviewed and discussed with patient.     Chronic back pain: fusion in 2018, see by pain mgt, stimulator in place, allergy to all narcotics      Chronic r shoulder pain: s/p rtc damage and injury in 2020 (fx); injected by ortho q 3 mos.  Now using medicinal marijuana.     H/o sarcoid (skin and lung involvement): nodules not sarcoid by bx, no sign of activity today ; h/o hcq and mtx-questionable diagnosis.  Says she went to [place] for evaluation and was told she did not have sarcoid. Elevated ACE level 8/9/22 of 125.  Follows with Dr. Kim in Pulmonology.CT chest 8/22 without evidence of active sarcoid.      Factor V leiden def: on eliquis chronically .  Now on 5 mg b.i.d..  Follows with Dr. Melo Colin.      Med monitoring: no issues w prolia     Hypokalemia  Possibly secondary to diuretic. Not taking potassium supplement currently.    Plan:          Prolia today.     Continue vitamin-D supplementation.  Weight-bearing activities as tolerated.  Caution to avoid falls.    Dermatology referral.  Patient prefers [facility].  She would like 2nd opinion on masses in her arms.    Topical Voltaren 3-4 times daily as needed for shoulder and knee pain.    Recommend increase potassium in diet and speak with PCP regarding this at upcoming appt 10/31/22.   CC note Dr. Miller    Follow up 6 months for Prolia with CMP 1 week prior in [place].     Brandy Bernal PA-C  [facility]  Rheumatology     50 minutes of total time spent on the encounter, which includes face to face time and non-face to face time preparing to see the patient (eg, review of tests), Obtaining and/or reviewing separately obtained history, Documenting clinical information in the electronic or other health record, Independently  interpreting results (not separately reported) and communicating results to the patient/family/caregiver, or Care coordination (not separately reported).

## 2022-10-11 ENCOUNTER — OFFICE VISIT (OUTPATIENT)
Dept: RHEUMATOLOGY | Facility: CLINIC | Age: 72
End: 2022-10-11
Payer: MEDICARE

## 2022-10-11 ENCOUNTER — INFUSION (OUTPATIENT)
Dept: INFUSION THERAPY | Facility: HOSPITAL | Age: 72
End: 2022-10-11
Attending: INTERNAL MEDICINE
Payer: MEDICARE

## 2022-10-11 ENCOUNTER — PATIENT MESSAGE (OUTPATIENT)
Dept: RHEUMATOLOGY | Facility: CLINIC | Age: 72
End: 2022-10-11

## 2022-10-11 VITALS
TEMPERATURE: 98 F | RESPIRATION RATE: 16 BRPM | SYSTOLIC BLOOD PRESSURE: 134 MMHG | HEART RATE: 61 BPM | DIASTOLIC BLOOD PRESSURE: 72 MMHG | OXYGEN SATURATION: 98 %

## 2022-10-11 VITALS
SYSTOLIC BLOOD PRESSURE: 119 MMHG | RESPIRATION RATE: 16 BRPM | HEART RATE: 65 BPM | WEIGHT: 235.44 LBS | HEIGHT: 63 IN | BODY MASS INDEX: 41.71 KG/M2 | DIASTOLIC BLOOD PRESSURE: 84 MMHG

## 2022-10-11 DIAGNOSIS — M81.0 OSTEOPOROSIS, POSTMENOPAUSAL: Primary | ICD-10-CM

## 2022-10-11 DIAGNOSIS — M12.811 ROTATOR CUFF TEAR ARTHROPATHY OF RIGHT SHOULDER: ICD-10-CM

## 2022-10-11 DIAGNOSIS — D86.9 SARCOID: ICD-10-CM

## 2022-10-11 DIAGNOSIS — D68.51 FACTOR V LEIDEN: ICD-10-CM

## 2022-10-11 DIAGNOSIS — M17.0 PRIMARY OSTEOARTHRITIS OF BOTH KNEES: ICD-10-CM

## 2022-10-11 DIAGNOSIS — M75.101 ROTATOR CUFF TEAR ARTHROPATHY OF RIGHT SHOULDER: ICD-10-CM

## 2022-10-11 DIAGNOSIS — M47.816 SPONDYLOSIS OF LUMBAR REGION WITHOUT MYELOPATHY OR RADICULOPATHY: ICD-10-CM

## 2022-10-11 DIAGNOSIS — R22.9 MASS OF SKIN: ICD-10-CM

## 2022-10-11 DIAGNOSIS — M81.0 AGE-RELATED OSTEOPOROSIS WITHOUT CURRENT PATHOLOGICAL FRACTURE: Primary | ICD-10-CM

## 2022-10-11 DIAGNOSIS — S22.000S COMPRESSION FX, THORACIC SPINE, SEQUELA: ICD-10-CM

## 2022-10-11 PROCEDURE — 63600175 PHARM REV CODE 636 W HCPCS: Mod: JG

## 2022-10-11 PROCEDURE — 1101F PT FALLS ASSESS-DOCD LE1/YR: CPT | Mod: CPTII,S$GLB,,

## 2022-10-11 PROCEDURE — 1159F MED LIST DOCD IN RCRD: CPT | Mod: CPTII,S$GLB,,

## 2022-10-11 PROCEDURE — 1125F AMNT PAIN NOTED PAIN PRSNT: CPT | Mod: CPTII,S$GLB,,

## 2022-10-11 PROCEDURE — 99215 PR OFFICE/OUTPT VISIT, EST, LEVL V, 40-54 MIN: ICD-10-PCS | Mod: S$GLB,,,

## 2022-10-11 PROCEDURE — 3288F FALL RISK ASSESSMENT DOCD: CPT | Mod: CPTII,S$GLB,,

## 2022-10-11 PROCEDURE — 99999 PR PBB SHADOW E&M-EST. PATIENT-LVL V: CPT | Mod: PBBFAC,,,

## 2022-10-11 PROCEDURE — 3079F DIAST BP 80-89 MM HG: CPT | Mod: CPTII,S$GLB,,

## 2022-10-11 PROCEDURE — 3008F BODY MASS INDEX DOCD: CPT | Mod: CPTII,S$GLB,,

## 2022-10-11 PROCEDURE — 3074F PR MOST RECENT SYSTOLIC BLOOD PRESSURE < 130 MM HG: ICD-10-PCS | Mod: CPTII,S$GLB,,

## 2022-10-11 PROCEDURE — 3008F PR BODY MASS INDEX (BMI) DOCUMENTED: ICD-10-PCS | Mod: CPTII,S$GLB,,

## 2022-10-11 PROCEDURE — 1159F PR MEDICATION LIST DOCUMENTED IN MEDICAL RECORD: ICD-10-PCS | Mod: CPTII,S$GLB,,

## 2022-10-11 PROCEDURE — 3079F PR MOST RECENT DIASTOLIC BLOOD PRESSURE 80-89 MM HG: ICD-10-PCS | Mod: CPTII,S$GLB,,

## 2022-10-11 PROCEDURE — 1125F PR PAIN SEVERITY QUANTIFIED, PAIN PRESENT: ICD-10-PCS | Mod: CPTII,S$GLB,,

## 2022-10-11 PROCEDURE — 1157F PR ADVANCE CARE PLAN OR EQUIV PRESENT IN MEDICAL RECORD: ICD-10-PCS | Mod: CPTII,S$GLB,,

## 2022-10-11 PROCEDURE — 1160F RVW MEDS BY RX/DR IN RCRD: CPT | Mod: CPTII,S$GLB,,

## 2022-10-11 PROCEDURE — 1160F PR REVIEW ALL MEDS BY PRESCRIBER/CLIN PHARMACIST DOCUMENTED: ICD-10-PCS | Mod: CPTII,S$GLB,,

## 2022-10-11 PROCEDURE — 1101F PR PT FALLS ASSESS DOC 0-1 FALLS W/OUT INJ PAST YR: ICD-10-PCS | Mod: CPTII,S$GLB,,

## 2022-10-11 PROCEDURE — 96372 THER/PROPH/DIAG INJ SC/IM: CPT

## 2022-10-11 PROCEDURE — 3288F PR FALLS RISK ASSESSMENT DOCUMENTED: ICD-10-PCS | Mod: CPTII,S$GLB,,

## 2022-10-11 PROCEDURE — 99215 OFFICE O/P EST HI 40 MIN: CPT | Mod: S$GLB,,,

## 2022-10-11 PROCEDURE — 3074F SYST BP LT 130 MM HG: CPT | Mod: CPTII,S$GLB,,

## 2022-10-11 PROCEDURE — 1157F ADVNC CARE PLAN IN RCRD: CPT | Mod: CPTII,S$GLB,,

## 2022-10-11 PROCEDURE — 99999 PR PBB SHADOW E&M-EST. PATIENT-LVL V: ICD-10-PCS | Mod: PBBFAC,,,

## 2022-10-11 RX ORDER — DIAZEPAM 2 MG/1
2 TABLET ORAL NIGHTLY
COMMUNITY
Start: 2022-09-28 | End: 2022-11-25 | Stop reason: SDUPTHER

## 2022-10-11 RX ADMIN — DENOSUMAB 60 MG: 60 INJECTION SUBCUTANEOUS at 03:10

## 2022-10-11 NOTE — DISCHARGE INSTRUCTIONS
.[facility]  [address] [address]  [address]  [phone] phone     [phone] fax  Hours of Operation: Monday- Friday 8:00am- 5:00pm  After hours phone  [phone]  Hematology / Oncology Physicians on call    Dr. Deion Morris      Nurse Practitioners:    Yennifer Santos, JAX Godinez, JAX Gates, JAX Love, JAX Carvalho, JAX Grider, PA      Please don't hesitate to call if you have any concerns.    .WAYS TO HELP PREVENT INFECTION        WASH YOUR HANDS OFTEN DURING THE DAY, ESPECIALLY BEFORE YOU EAT, AFTER USING THE BATHROOM, AND AFTER TOUCHING ANIMALS    STAY AWAY FROM PEOPLE WHO HAVE ILLNESSES YOU CAN CATCH; SUCH AS COLDS, FLU, CHICKEN POX    TRY TO AVOID CROWDS    STAY AWAY FROM CHILDREN WHO RECENTLY HAVE RECEIVED LIVE VIRUS VACCINES    MAINTAIN GOOD MOUTH CARE    DO NOT SQUEEZE OR SCRATCH PIMPLES    CLEAN CUTS & SCRAPES RIGHT AWAY AND DAILY UNTIL HEALED WITH WARM WATER, SOAP & AN ANTISEPTIC    AVOID CONTACT WITH LITTER BOXES, BIRD CAGES, & FISH TANKS    AVOID STANDING WATER, IE., BIRD BATHS, FLOWER POTS/VASES, OR HUMIDIFIERS    WEAR GLOVES WHEN GARDENING OR CLEANING UP AFTER OTHERS, ESPECIALLY BABIES & SMALL CHILDREN    DO NOT EAT RAW FISH, SEAFOOD, MEAT, OR EGGS

## 2022-10-11 NOTE — PATIENT INSTRUCTIONS
High potassium foods:    Asparagus.  Avocados.  Bananas.  Citrus fruits and juices, such as oranges and grapefruit.  Cooked spinach.  Melons like honeydew and cantaloupe.  Nectarines.  Potatoes.  Prunes, raisins and other dried fruits.  Pumpkin and winter squash.  Salt substitutes that contain potassium.  Tomatoes and tomato-based products like sauces and ketchup.

## 2022-10-11 NOTE — NURSING
.Injection given without difficulties.Bandaid applied. Patient instructed to stay in the clinic for 15 minutes. Patient verbalized understanding and will notify nurse with any complaints. Patient RTC 4/11/23

## 2022-10-11 NOTE — Clinical Note
Good afternoon,   I saw this patient today. Her recent potassium level was low. She has an upcoming appt with you 10/31. She states she is not currently taking potassium supplementation. I'm sure you'll see her labs at your appt but wanted to reach out to make you aware in case you wanted labs prior.   Thank you, Brandy Bernal PA-C

## 2022-10-25 PROCEDURE — G0179 MD RECERTIFICATION HHA PT: HCPCS | Mod: ,,, | Performed by: INTERNAL MEDICINE

## 2022-10-25 PROCEDURE — G0179 PR HOME HEALTH MD RECERTIFICATION: ICD-10-PCS | Mod: ,,, | Performed by: INTERNAL MEDICINE

## 2022-10-28 ENCOUNTER — DOCUMENT SCAN (OUTPATIENT)
Dept: HOME HEALTH SERVICES | Facility: HOSPITAL | Age: 72
End: 2022-10-28
Payer: MEDICARE

## 2022-10-28 ENCOUNTER — TELEPHONE (OUTPATIENT)
Dept: FAMILY MEDICINE | Facility: CLINIC | Age: 72
End: 2022-10-28
Payer: MEDICARE

## 2022-10-28 NOTE — TELEPHONE ENCOUNTER
----- Message from Jannie Uribe sent at 10/28/2022  1:20 PM CDT -----  Regarding: Appt  Contact: [phone]  Patients Cori is calling. Patients  tested positive for Covid and had to change her date. Patient would like to have her  seen on the same day as her rescheduled appt 11/25. Please call patient at [phone]     Thank You

## 2022-10-30 ENCOUNTER — NURSE TRIAGE (OUTPATIENT)
Dept: ADMINISTRATIVE | Facility: CLINIC | Age: 72
End: 2022-10-30
Payer: MEDICARE

## 2022-10-30 NOTE — TELEPHONE ENCOUNTER
Patient c/o a expanding bruise to her right arm. States that she did not injure herself. Bruise continue to spread and is now about 6 inches around her bicep. Patient on eliquis. Advised per protocol to be seen in the next 4 hours. Patient VU. Advised the patient to call back with any further questions or if symptoms worsen.        Reason for Disposition   [1] Raised bruise AND [2] size > 2 inches (5 cm) AND [3] getting bigger    Additional Information   Negative: Shock suspected (e.g., cold/pale/clammy skin, too weak to stand, low BP, rapid pulse)   Negative: Sounds like a life-threatening emergency to the triager   Negative: Dizziness or lightheadedness   Negative: [1] Bruise on head/face, chest, or abdomen AND [2]  taking Coumadin (warfarin) or other strong blood thinner, or known bleeding disorder (e.g., thrombocytopenia)   Negative: Unexplained bleeding from another site (e.g., gums, nose, urine) as well   Negative: Patient sounds very sick or weak to the triager   Negative: [1] Not caused by an injury AND [2] 5 or more bruises now    Protocols used: Bruises-A-AH

## 2022-10-31 NOTE — TELEPHONE ENCOUNTER
"Spoke with pt. She reported that bruising on her arm that is "pooling" per pt down arm. Pt reported that she spoke with her orthopedic and that it is d/t her Eliquis. Pt sent pictures to her orthopedic for review and pt is monitoring. Notified pt to contact clinic as needed. Verbalized understanding.   "

## 2022-11-10 ENCOUNTER — EXTERNAL HOME HEALTH (OUTPATIENT)
Dept: HOME HEALTH SERVICES | Facility: HOSPITAL | Age: 72
End: 2022-11-10
Payer: MEDICARE

## 2022-11-21 ENCOUNTER — TELEPHONE (OUTPATIENT)
Dept: FAMILY MEDICINE | Facility: CLINIC | Age: 72
End: 2022-11-21
Payer: MEDICARE

## 2022-11-21 NOTE — TELEPHONE ENCOUNTER
----- Message from Faheem Joseph sent at 11/21/2022  2:05 PM CST -----  Regarding: new rx  Contact: Patient  Patient want to know if office can send her something else in for sinus infection, patient have cough and headache, If so please send to     [facility] - [place] - [address] [address]  [address] [address] [place] [address]  Phone: [phone] Fax: [phone]

## 2022-11-25 ENCOUNTER — OFFICE VISIT (OUTPATIENT)
Dept: FAMILY MEDICINE | Facility: CLINIC | Age: 72
End: 2022-11-25
Payer: MEDICARE

## 2022-11-25 ENCOUNTER — LAB VISIT (OUTPATIENT)
Dept: LAB | Facility: HOSPITAL | Age: 72
End: 2022-11-25
Attending: INTERNAL MEDICINE
Payer: MEDICARE

## 2022-11-25 ENCOUNTER — DOCUMENT SCAN (OUTPATIENT)
Dept: HOME HEALTH SERVICES | Facility: HOSPITAL | Age: 72
End: 2022-11-25
Payer: MEDICARE

## 2022-11-25 VITALS
HEIGHT: 63 IN | SYSTOLIC BLOOD PRESSURE: 110 MMHG | OXYGEN SATURATION: 98 % | DIASTOLIC BLOOD PRESSURE: 72 MMHG | RESPIRATION RATE: 14 BRPM | HEART RATE: 73 BPM | BODY MASS INDEX: 40.86 KG/M2 | WEIGHT: 230.63 LBS

## 2022-11-25 DIAGNOSIS — G89.4 CHRONIC PAIN DISORDER: ICD-10-CM

## 2022-11-25 DIAGNOSIS — I10 ESSENTIAL (PRIMARY) HYPERTENSION: ICD-10-CM

## 2022-11-25 DIAGNOSIS — D64.9 ANEMIA, UNSPECIFIED TYPE: ICD-10-CM

## 2022-11-25 DIAGNOSIS — F33.1 MODERATE EPISODE OF RECURRENT MAJOR DEPRESSIVE DISORDER: ICD-10-CM

## 2022-11-25 DIAGNOSIS — Z01.89 ENCOUNTER FOR ROUTINE LABORATORY TESTING: ICD-10-CM

## 2022-11-25 DIAGNOSIS — G31.84 MCI (MILD COGNITIVE IMPAIRMENT): Primary | ICD-10-CM

## 2022-11-25 DIAGNOSIS — G47.00 INSOMNIA, UNSPECIFIED TYPE: ICD-10-CM

## 2022-11-25 LAB
ALBUMIN SERPL BCP-MCNC: 3.7 G/DL (ref 3.5–5.2)
ALP SERPL-CCNC: 114 U/L (ref 55–135)
ALT SERPL W/O P-5'-P-CCNC: 21 U/L (ref 10–44)
ANION GAP SERPL CALC-SCNC: 9 MMOL/L (ref 8–16)
AST SERPL-CCNC: 22 U/L (ref 10–40)
BILIRUB SERPL-MCNC: 0.6 MG/DL (ref 0.1–1)
BUN SERPL-MCNC: 19 MG/DL (ref 8–23)
CALCIUM SERPL-MCNC: 9.1 MG/DL (ref 8.7–10.5)
CHLORIDE SERPL-SCNC: 108 MMOL/L (ref 95–110)
CO2 SERPL-SCNC: 23 MMOL/L (ref 23–29)
CREAT SERPL-MCNC: 0.8 MG/DL (ref 0.5–1.4)
ERYTHROCYTE [DISTWIDTH] IN BLOOD BY AUTOMATED COUNT: 13.3 % (ref 11.5–14.5)
EST. GFR  (NO RACE VARIABLE): >60 ML/MIN/1.73 M^2
GLUCOSE SERPL-MCNC: 103 MG/DL (ref 70–110)
HCT VFR BLD AUTO: 35.6 % (ref 37–48.5)
HGB BLD-MCNC: 11.9 G/DL (ref 12–16)
IRON SERPL-MCNC: 89 UG/DL (ref 30–160)
MCH RBC QN AUTO: 33.2 PG (ref 27–31)
MCHC RBC AUTO-ENTMCNC: 33.4 G/DL (ref 32–36)
MCV RBC AUTO: 99 FL (ref 82–98)
PLATELET # BLD AUTO: 182 K/UL (ref 150–450)
PMV BLD AUTO: 10.1 FL (ref 9.2–12.9)
POTASSIUM SERPL-SCNC: 3.9 MMOL/L (ref 3.5–5.1)
PROT SERPL-MCNC: 7 G/DL (ref 6–8.4)
RBC # BLD AUTO: 3.58 M/UL (ref 4–5.4)
SATURATED IRON: 24 % (ref 20–50)
SODIUM SERPL-SCNC: 140 MMOL/L (ref 136–145)
TOTAL IRON BINDING CAPACITY: 369 UG/DL (ref 250–450)
TRANSFERRIN SERPL-MCNC: 249 MG/DL (ref 200–375)
WBC # BLD AUTO: 5.62 K/UL (ref 3.9–12.7)

## 2022-11-25 PROCEDURE — 85027 COMPLETE CBC AUTOMATED: CPT | Performed by: INTERNAL MEDICINE

## 2022-11-25 PROCEDURE — 99999 PR PBB SHADOW E&M-EST. PATIENT-LVL V: CPT | Mod: PBBFAC,,, | Performed by: INTERNAL MEDICINE

## 2022-11-25 PROCEDURE — 1157F PR ADVANCE CARE PLAN OR EQUIV PRESENT IN MEDICAL RECORD: ICD-10-PCS | Mod: CPTII,S$GLB,, | Performed by: INTERNAL MEDICINE

## 2022-11-25 PROCEDURE — 1159F MED LIST DOCD IN RCRD: CPT | Mod: CPTII,S$GLB,, | Performed by: INTERNAL MEDICINE

## 2022-11-25 PROCEDURE — 1157F ADVNC CARE PLAN IN RCRD: CPT | Mod: CPTII,S$GLB,, | Performed by: INTERNAL MEDICINE

## 2022-11-25 PROCEDURE — 1160F PR REVIEW ALL MEDS BY PRESCRIBER/CLIN PHARMACIST DOCUMENTED: ICD-10-PCS | Mod: CPTII,S$GLB,, | Performed by: INTERNAL MEDICINE

## 2022-11-25 PROCEDURE — 1101F PT FALLS ASSESS-DOCD LE1/YR: CPT | Mod: CPTII,S$GLB,, | Performed by: INTERNAL MEDICINE

## 2022-11-25 PROCEDURE — 3008F PR BODY MASS INDEX (BMI) DOCUMENTED: ICD-10-PCS | Mod: CPTII,S$GLB,, | Performed by: INTERNAL MEDICINE

## 2022-11-25 PROCEDURE — 3008F BODY MASS INDEX DOCD: CPT | Mod: CPTII,S$GLB,, | Performed by: INTERNAL MEDICINE

## 2022-11-25 PROCEDURE — 1125F PR PAIN SEVERITY QUANTIFIED, PAIN PRESENT: ICD-10-PCS | Mod: CPTII,S$GLB,, | Performed by: INTERNAL MEDICINE

## 2022-11-25 PROCEDURE — 99999 PR PBB SHADOW E&M-EST. PATIENT-LVL V: ICD-10-PCS | Mod: PBBFAC,,, | Performed by: INTERNAL MEDICINE

## 2022-11-25 PROCEDURE — 1160F RVW MEDS BY RX/DR IN RCRD: CPT | Mod: CPTII,S$GLB,, | Performed by: INTERNAL MEDICINE

## 2022-11-25 PROCEDURE — 99214 OFFICE O/P EST MOD 30 MIN: CPT | Mod: S$GLB,,, | Performed by: INTERNAL MEDICINE

## 2022-11-25 PROCEDURE — 99214 PR OFFICE/OUTPT VISIT, EST, LEVL IV, 30-39 MIN: ICD-10-PCS | Mod: S$GLB,,, | Performed by: INTERNAL MEDICINE

## 2022-11-25 PROCEDURE — 3074F SYST BP LT 130 MM HG: CPT | Mod: CPTII,S$GLB,, | Performed by: INTERNAL MEDICINE

## 2022-11-25 PROCEDURE — 36415 COLL VENOUS BLD VENIPUNCTURE: CPT | Mod: PN | Performed by: INTERNAL MEDICINE

## 2022-11-25 PROCEDURE — 3074F PR MOST RECENT SYSTOLIC BLOOD PRESSURE < 130 MM HG: ICD-10-PCS | Mod: CPTII,S$GLB,, | Performed by: INTERNAL MEDICINE

## 2022-11-25 PROCEDURE — 1125F AMNT PAIN NOTED PAIN PRSNT: CPT | Mod: CPTII,S$GLB,, | Performed by: INTERNAL MEDICINE

## 2022-11-25 PROCEDURE — 84466 ASSAY OF TRANSFERRIN: CPT | Performed by: INTERNAL MEDICINE

## 2022-11-25 PROCEDURE — 3288F PR FALLS RISK ASSESSMENT DOCUMENTED: ICD-10-PCS | Mod: CPTII,S$GLB,, | Performed by: INTERNAL MEDICINE

## 2022-11-25 PROCEDURE — 3078F DIAST BP <80 MM HG: CPT | Mod: CPTII,S$GLB,, | Performed by: INTERNAL MEDICINE

## 2022-11-25 PROCEDURE — 3288F FALL RISK ASSESSMENT DOCD: CPT | Mod: CPTII,S$GLB,, | Performed by: INTERNAL MEDICINE

## 2022-11-25 PROCEDURE — 80053 COMPREHEN METABOLIC PANEL: CPT | Performed by: INTERNAL MEDICINE

## 2022-11-25 PROCEDURE — 1101F PR PT FALLS ASSESS DOC 0-1 FALLS W/OUT INJ PAST YR: ICD-10-PCS | Mod: CPTII,S$GLB,, | Performed by: INTERNAL MEDICINE

## 2022-11-25 PROCEDURE — 1159F PR MEDICATION LIST DOCUMENTED IN MEDICAL RECORD: ICD-10-PCS | Mod: CPTII,S$GLB,, | Performed by: INTERNAL MEDICINE

## 2022-11-25 PROCEDURE — 3078F PR MOST RECENT DIASTOLIC BLOOD PRESSURE < 80 MM HG: ICD-10-PCS | Mod: CPTII,S$GLB,, | Performed by: INTERNAL MEDICINE

## 2022-11-25 RX ORDER — DIAZEPAM 2 MG/1
TABLET ORAL
Qty: 30 TABLET | Refills: 0 | Status: SHIPPED | OUTPATIENT
Start: 2022-11-25 | End: 2023-04-11

## 2022-11-25 RX ORDER — DOXYCYCLINE 100 MG/1
100 CAPSULE ORAL 2 TIMES DAILY
COMMUNITY
Start: 2022-11-16 | End: 2022-11-25

## 2022-11-25 RX ORDER — QUETIAPINE FUMARATE 50 MG/1
TABLET, EXTENDED RELEASE ORAL
Qty: 30 TABLET | Refills: 3 | Status: SHIPPED | OUTPATIENT
Start: 2022-11-25 | End: 2023-05-15 | Stop reason: CLARIF

## 2022-11-25 RX ORDER — POTASSIUM CHLORIDE 750 MG/1
10 TABLET, EXTENDED RELEASE ORAL EVERY OTHER DAY
Qty: 30 TABLET | Refills: 6 | Status: SHIPPED | OUTPATIENT
Start: 2022-11-25 | End: 2024-01-09

## 2022-11-25 NOTE — PROGRESS NOTES
"Subjective:       Patient ID: Cori Avalos is a 72 y.o. female.    Chief Complaint: Follow-up    HPI  Gyn: Dr Hunt 2022  MMG:  10/22  Dexa: 2019-osteopenia -1.5 & -1.9  w fragility fracture. Tx:  Prolia mgmt in [place] at rheum MD  Colonoscopy:   2/25/19 adenoma r/c 3 yr Dr Jeff   Immunizations: Flu: Tdap: [facility] 2021 cat bite Pneumovax: 2014 Prevnar 13: 2009 Shingles: old  Covid: yes   Smoker:  Never   Eye: [place]       Torn her left bicep recently while using shower towel.  Has seen ortho Dr Guevara. No surgery recommended.      Right leg vein procedure planned w cardio Dr Colin     Sinus infection recently seen at urgent care and taking Doxy.      MCI w memory loss, confusion: off medical THC, Elavil  and Weaning off Valium down to 2 mg once daily.  Daughter reports has been slightly difficult for her "helps me sleep" but she has noticed much improved cognition, memory, not falling asleep unexpectedly, and no falls. Off O2 today.     Hx of hospital admit w unresponsiveness, elevated carbon monoxide poisoning thought due to sleep apnea and sedatives. Wasn't napping with her CPAP. Is encouraged to due so but daughter reports having hard time remembering.      Hx of OAB incontinence bladder and bowel, frequent UTI.  + depends     Depression: still feeling depressed.  Pet passed recently, medical issues. Rx Cymbalta 90   Insomnia: since wean off Elavil and Valium     Chronic arthralgias:  Shoulders are issue bicep tears and causing diff ADLs. Prev Rx medical THC pain mgmt Dr Bocanegra //helping w pain- shoulder and knee pain     HTN: controlled Rx metoprolol 50,  A fib: + loop recorder// Rx; Eliquis.& Metoprolol  /Cardio: Dr Colin   CKD stage 3:  GFR 50s-   Anemia:  Hemoglobin 10 iron sat 18 recommend get over-the-counter iron supplement take once or twice daily //.    RLS:  Controlled Rx Requip 5  Back DDD; s/p injections//Dr Flanagan has pain stimulator. //Rx wean downed to 2 from 10 mg Valium nightly muscle relaxer Dr" "Daljit     Hx Cutaneous sarcoid: c/o hard nodules under skin- some questions if truly dx.  Told outside  was not sarcoid.  Allergies:  Controlled Rx astelin, Flunisolide nasal, H1   KENROY: using daily with O2 auto CPAP    GERD: controlled Rx Nexium 40       Review of Systems:  Review of Systems   Constitutional:  Negative for appetite change.   HENT:  Negative for nosebleeds.    Eyes:  Negative for pain.   Respiratory:  Negative for choking.    Cardiovascular:  Negative for chest pain.   Gastrointestinal:  Negative for blood in stool.   Genitourinary:  Negative for hematuria.   Musculoskeletal:  Positive for arthralgias and myalgias. Negative for joint swelling.   Skin:  Negative for pallor.   Neurological:  Negative for facial asymmetry.   Hematological:  Does not bruise/bleed easily.   Psychiatric/Behavioral:  Negative for confusion.      Objective:     Vitals:    11/25/22 0912   BP: 110/72   BP Location: Left arm   Patient Position: Sitting   BP Method: Large (Manual)   Pulse: 73   Resp: 14   SpO2: 98%   Weight: 104.6 kg (230 lb 9.6 oz)   Height: 5' 3" (1.6 m)          Physical Exam  Constitutional:       Appearance: Normal appearance. She is obese.      Comments: Here w daughter, much improved mood, alertness with today visit-closer to her normal baseline     HENT:      Head: Normocephalic and atraumatic.   Eyes:      Extraocular Movements: Extraocular movements intact.      Pupils: Pupils are equal, round, and reactive to light.   Cardiovascular:      Rate and Rhythm: Normal rate.   Pulmonary:      Effort: Pulmonary effort is normal.   Neurological:      Mental Status: She is alert and oriented to person, place, and time.   Psychiatric:         Mood and Affect: Mood normal.       Medication List with Changes/Refills   New Medications    QUETIAPINE (SEROQUEL XR) 50 MG TB24    0.5-1 po QHS (Sleep / mood)   Current Medications    ALBUTEROL (PROVENTIL) 2.5 MG /3 ML (0.083 %) NEBULIZER SOLUTION     "    ALBUTEROL (PROVENTIL/VENTOLIN HFA) 90 MCG/ACTUATION INHALER    Inhale 2 puffs into the lungs every 4 (four) hours as needed for Wheezing or Shortness of Breath.    APIXABAN (ELIQUIS) 5 MG TAB    Take 5 mg by mouth 2 (two) times daily.    AZELASTINE (ASTELIN) 137 MCG (0.1 %) NASAL SPRAY    1 spray (137 mcg total) by Nasal route 2 (two) times daily.    BENZONATATE (TESSALON) 100 MG CAPSULE    Take 100 mg by mouth 3 (three) times daily as needed.     BUDESONIDE-FORMOTEROL 80-4.5 MCG (SYMBICORT) 80-4.5 MCG/ACTUATION HFAA    Inhale 2 puffs into the lungs 2 (two) times a day. Controller    CALCIUM 500 500 MG CALCIUM (1,250 MG) TABLET    Take 1 tablet by mouth once daily.    CHOLECALCIFEROL, VITAMIN D3, (VITAMIN D3) 25 MCG (1,000 UNIT) CAPSULE    Take 1,000 Units by mouth once daily.    DENOSUMAB (PROLIA) 60 MG/ML SYRG    Inject 60 mg into the skin every 6 (six) months.     DIPHENOXYLATE-ATROPINE 2.5-0.025 MG (LOMOTIL) 2.5-0.025 MG PER TABLET    TAKE ONE TABLET BY MOUTH FOUR TIMES DAILY AS NEEDED FOR DIARRHEA    DULOXETINE (CYMBALTA) 30 MG CAPSULE    Take 1 capsule (30 mg total) by mouth once daily.    DULOXETINE (CYMBALTA) 60 MG CAPSULE    TAKE 1 CAPSULE BY MOUTH ONCE DAILY.    ESOMEPRAZOLE (NEXIUM) 40 MG CAPSULE    TAKE ONE CAPSULE BY MOUTH EVERY DAY    FLUNISOLIDE 25 MCG, 0.025%, (NASALIDE) 25 MCG (0.025 %) SPRY    2 sprays by Nasal route once daily.    FUROSEMIDE (LASIX) 20 MG TABLET    Take 20 mg by mouth every other day.     GUAIFENESIN (MUCINEX) 600 MG 12 HR TABLET    Take 1,200 mg by mouth 2 (two) times daily as needed.     IBUPROFEN (ADVIL,MOTRIN) 800 MG TABLET    Take 800 mg by mouth every 6 (six) hours as needed.     METOPROLOL TARTRATE (LOPRESSOR) 50 MG TABLET    Take 75 mg by mouth 2 (two) times daily. Pt taking 1 & a half tablets twice a day.    NYSTATIN-TRIAMCINOLONE (MYCOLOG II) CREAM    SMARTSIG:Sparingly Topical 2-3 Times Daily PRN    ONDANSETRON (ZOFRAN-ODT) 4 MG TBDL    Take 1 tablet (4 mg total) by  mouth every 8 (eight) hours as needed.    ROPINIROLE (REQUIP) 5 MG TABLET    Take 1 tablet (5 mg total) by mouth nightly.   Changed and/or Refilled Medications    Modified Medication Previous Medication    DIAZEPAM (VALIUM) 2 MG TABLET diazePAM (VALIUM) 2 MG tablet       1/2 tab nightly    Take 2 mg by mouth every evening.    POTASSIUM CHLORIDE (KLOR-CON) 10 MEQ TBSR potassium chloride (KLOR-CON) 10 MEQ TbSR       Take 1 tablet (10 mEq total) by mouth every other day. Take w Lasix dose    Take 10 mEq by mouth once daily.   Discontinued Medications    CANNABIDIOL (EPIDIOLEX) 100 MG/ML    Take 300 mg by mouth 2 (two) times daily.    ESTRADIOL (ESTRACE) 0.01 % (0.1 MG/GRAM) VAGINAL CREAM    SMARTSI Gram(s) Vaginal    LEVOCETIRIZINE (XYZAL) 5 MG TABLET    Take 1 tablet (5 mg total) by mouth every evening.    TIZANIDINE (ZANAFLEX) 4 MG TABLET    Take 22 mg by mouth every 8 (eight) hours.       Assessment & Plan:  1. MCI (mild cognitive impairment)    2. Moderate episode of recurrent major depressive disorder  - QUEtiapine (SEROQUEL XR) 50 mg Tb24; 0.5-1 po QHS (Sleep / mood)  Dispense: 30 tablet; Refill: 3    3. Insomnia, unspecified type    4. Chronic pain disorder  - diazePAM (VALIUM) 2 MG tablet; 1/2 tab nightly  Dispense: 30 tablet; Refill: 0    5. Essential (primary) hypertension  - Comprehensive Metabolic Panel; Future    6. Anemia, unspecified type  - Iron and TIBC; Future  - CBC Without Differential; Future    7. Encounter for routine laboratory testing  - Iron and TIBC; Future  - CBC Without Differential; Future  - Comprehensive Metabolic Panel; Future   MCI (mild cognitive impairment)    Moderate episode of recurrent major depressive disorder  -     QUEtiapine (SEROQUEL XR) 50 mg Tb24; 0.5-1 po QHS (Sleep / mood)  Dispense: 30 tablet; Refill: 3    Insomnia, unspecified type  Comments:  add low dose seroquel for depression and insomina     Chronic pain disorder  -     diazePAM (VALIUM) 2 MG tablet; 1/2 tab  nightly  Dispense: 30 tablet; Refill: 0    Essential (primary) hypertension  -     Comprehensive Metabolic Panel; Future; Expected date: 11/25/2022    Anemia, unspecified type  -     Iron and TIBC; Future; Expected date: 11/25/2022  -     CBC Without Differential; Future; Expected date: 11/25/2022    Encounter for routine laboratory testing  -     Iron and TIBC; Future; Expected date: 11/25/2022  -     CBC Without Differential; Future; Expected date: 11/25/2022  -     Comprehensive Metabolic Panel; Future; Expected date: 11/25/2022    Other orders  -     potassium chloride (KLOR-CON) 10 MEQ TbSR; Take 1 tablet (10 mEq total) by mouth every other day. Take w Lasix dose  Dispense: 30 tablet; Refill: 6      Continue to work on regular exercise, maintain healthy weight, balanced diet. Avoid unhealthy habits: smoking, excessive alcohol intake.

## 2022-11-28 PROBLEM — J96.11 CHRONIC RESPIRATORY FAILURE WITH HYPOXIA, ON HOME OXYGEN THERAPY: Status: RESOLVED | Noted: 2022-08-25 | Resolved: 2022-11-28

## 2022-11-28 PROBLEM — Z99.81 CHRONIC RESPIRATORY FAILURE WITH HYPOXIA, ON HOME OXYGEN THERAPY: Status: RESOLVED | Noted: 2022-08-25 | Resolved: 2022-11-28

## 2022-12-05 PROBLEM — G47.00 INSOMNIA: Status: ACTIVE | Noted: 2022-12-05

## 2022-12-12 ENCOUNTER — NURSE TRIAGE (OUTPATIENT)
Dept: ADMINISTRATIVE | Facility: CLINIC | Age: 72
End: 2022-12-12
Payer: MEDICARE

## 2022-12-12 ENCOUNTER — TELEPHONE (OUTPATIENT)
Dept: FAMILY MEDICINE | Facility: CLINIC | Age: 72
End: 2022-12-12
Payer: MEDICARE

## 2022-12-12 PROBLEM — N39.0 RECURRENT UTI: Status: RESOLVED | Noted: 2022-09-07 | Resolved: 2022-12-12

## 2022-12-12 NOTE — TELEPHONE ENCOUNTER
----- Message from Melinda Lopez sent at 12/12/2022 10:04 AM CST -----  Contact: NIURKA CORDOBA [[id number]] [phone]  Type:  Needs Medical Advice    Who Called: NIURKA CORDOBA [[id number]]  Symptoms (please be specific): Bronchitis  How long has patient had these symptoms: Diagnosed last week at an Urgent Care  Pharmacy name and phone #: [facility] - [place], [place] - [address] [address], [phone]  Would the patient rather a call back or a response via [facility]? Phone call   Best Call Back Number: [phone]  Additional Information:

## 2022-12-13 ENCOUNTER — TELEPHONE (OUTPATIENT)
Dept: FAMILY MEDICINE | Facility: CLINIC | Age: 72
End: 2022-12-13
Payer: MEDICARE

## 2022-12-13 NOTE — TELEPHONE ENCOUNTER
Pt went to [facility] [place] Wednesday and was dx with Bronchitis. Pt has taken albuterol nebs and inhaler, tessalon pearls, and symbicort with no sx relief. Pt states that she feels her sxs have worsened. Pt is concerned that she may have RSV. She notes that she tested negative for both Covid and Flu.     Pt's spO2 is currently @ 93%. She has a stridorous, productive cough. Care Advice recommends that pt call  now. Pt states that she will go to the ED, but she is not calling 911. Pt is instructed to call back once she is seen with any further questions, concerns, or new/worsening sxs. Pt verbalizes understanding.   Reason for Disposition   [1] Stridor AND [2] difficulty breathing    Additional Information   Negative: SEVERE difficulty breathing (e.g., struggling for each breath, speaks in single words)   Negative: Bluish (or gray) lips or face now   Negative: [1] Difficulty breathing AND [2] exposure to flames, smoke, or fumes    Protocols used: Cough - Acute [id number]-O-AG

## 2022-12-13 NOTE — TELEPHONE ENCOUNTER
----- Message from Elsa Rivera sent at 12/13/2022  3:29 PM CST -----  Contact: [phone]  Type: Needs Medical Advice  Who Called:  Pt   Symptoms (please be specific):  Black stool       Best Call Back Number: [phone]    Additional Information: Pt upset because she stated she has been trying to get in touch with office since last week and no one has called her back.  Pls call back and advise

## 2023-01-17 ENCOUNTER — TELEPHONE (OUTPATIENT)
Dept: FAMILY MEDICINE | Facility: CLINIC | Age: 73
End: 2023-01-17
Payer: MEDICARE

## 2023-01-17 NOTE — TELEPHONE ENCOUNTER
----- Message from Avani Porras sent at 1/17/2023 11:17 AM CST -----  Regarding: pt called  Name of Who is Calling: NIURKA CORDOBA [[id number]]      What is the request in detail: the new anti depressant is giving the patient problems. Please advise       Can the clinic reply by [facility]: No      What Number to Call Back if not in [facility]: [phone] (home)

## 2023-01-18 ENCOUNTER — TELEPHONE (OUTPATIENT)
Dept: FAMILY MEDICINE | Facility: CLINIC | Age: 73
End: 2023-01-18
Payer: MEDICARE

## 2023-01-18 NOTE — TELEPHONE ENCOUNTER
----- Message from Julia Newton sent at 1/18/2023 11:22 AM CST -----  Contact: self  Type:  Patient Returning Call    Who Called:self  Who Left Message for Patient:Elle Ayers  Does the patient know what this is regarding?:rx  Would the patient rather a call back or a response via [facility]? call  Best Call Back Number:[phone] (home)     Additional Information: pt wants to talk with dr about rx. Missed a call on 1/17. Please advise and thank you.

## 2023-01-18 NOTE — TELEPHONE ENCOUNTER
Pt taking half tab of Seroquel  Pt having nightmares and unable to sleep  Still with depression-no thoughts of suicide  Has taken for about a month, but would like to wean off this

## 2023-04-06 ENCOUNTER — LAB VISIT (OUTPATIENT)
Dept: LAB | Facility: HOSPITAL | Age: 73
End: 2023-04-06
Payer: MEDICARE

## 2023-04-06 DIAGNOSIS — M81.0 OSTEOPOROSIS, POSTMENOPAUSAL: ICD-10-CM

## 2023-04-06 DIAGNOSIS — Z51.81 MEDICATION MONITORING ENCOUNTER: ICD-10-CM

## 2023-04-06 LAB
ALBUMIN SERPL BCP-MCNC: 4 G/DL (ref 3.5–5.2)
ALP SERPL-CCNC: 100 U/L (ref 55–135)
ALT SERPL W/O P-5'-P-CCNC: 24 U/L (ref 10–44)
ANION GAP SERPL CALC-SCNC: 13 MMOL/L (ref 8–16)
AST SERPL-CCNC: 21 U/L (ref 10–40)
BILIRUB SERPL-MCNC: 0.9 MG/DL (ref 0.1–1)
BUN SERPL-MCNC: 24 MG/DL (ref 8–23)
CALCIUM SERPL-MCNC: 10.2 MG/DL (ref 8.7–10.5)
CHLORIDE SERPL-SCNC: 105 MMOL/L (ref 95–110)
CO2 SERPL-SCNC: 28 MMOL/L (ref 23–29)
CREAT SERPL-MCNC: 1 MG/DL (ref 0.5–1.4)
EST. GFR  (NO RACE VARIABLE): 59 ML/MIN/1.73 M^2
GLUCOSE SERPL-MCNC: 112 MG/DL (ref 70–110)
POTASSIUM SERPL-SCNC: 3.5 MMOL/L (ref 3.5–5.1)
PROT SERPL-MCNC: 7.5 G/DL (ref 6–8.4)
SODIUM SERPL-SCNC: 146 MMOL/L (ref 136–145)

## 2023-04-06 PROCEDURE — 80053 COMPREHEN METABOLIC PANEL: CPT | Mod: PO

## 2023-04-06 PROCEDURE — 36415 COLL VENOUS BLD VENIPUNCTURE: CPT | Mod: PO

## 2023-04-10 NOTE — PROGRESS NOTES
RHEUMATOLOGY OUTPATIENT CLINIC NOTE    04/11/2023    Subjective:       Patient ID: Cori Avalos is a 73 y.o. female.    Chief Complaint: Osteoporosis      HPI    Cori Avalos is a 73 y.o. pleasant female here for rheumatology follow up for osteoporosis and osteoarthritis.  Tolerating Prolia infusions well, no falls or fractures since last appointment.       She does have occasional shoulder pain from prior surgery, torn biceps, torn rotator cuff right side worse than left.  Has pain while sleeping at nighttime.  She typically sleeps on her left side, back.    Uses CPAP at nighttime.  Dry mouth at night.    She has not established with Dermatology at for a 2nd opinion on lumps in her arms.    She denies any swelling of any joints, redness, increased warmth any joints.   No upcoming planned invasive dental work.  No other shortness of breath.  Rheumatologic review of systems negative otherwise.      Physical exam with tenderness to palpation right shoulder and decreased range of motion less than 90° with passive and active range of motion, tender to palpation left shoulder.  With Rollator.  Able to rise from chair without assistance.    Past Medical History:   Diagnosis Date    AC (acromioclavicular) joint bone spurs     Acid reflux 4/14/2011    Adrenal insufficiency     Biceps muscle tear     Right    Bilateral lower extremity edema     Depression     Encounter for blood transfusion 1978    Factor V Leiden     Falls frequently     History of supraventricular tachycardia     Hypertension     IBS (irritable bowel syndrome)     Immunocompromised due to corticosteroids 4/12/2016    Long term current use of anticoagulant     Meralgia paresthetica of left side     Osteoarthritis     Osteoporosis     Restless leg syndrome     Rotator cuff injury     Right    S/P ablation operation for arrhythmia     Sleep apnea     cpap     Past Surgical History:   Procedure Laterality Date    APPENDECTOMY      BACK SURGERY  2013  and 2018    CARDIAC ELECTROPHYSIOLOGY STUDY AND ABLATION      CARPAL TUNNEL RELEASE Right     CHOLECYSTECTOMY      COLECTOMY      ECTOPIC PREGNANCY SURGERY      EYE SURGERY Bilateral     cataracts with iol    HERNIA REPAIR      Umbilical    INSERTION OF IMPLANTABLE LOOP RECORDER N/A 04/27/2021    Procedure: INSERTION, IMPLANTABLE LOOP RECORDER;  Surgeon: Melo Colin MD;  Location: [facility];  Service: Cardiology;  Laterality: N/A;    JOINT REPLACEMENT Left     Knee    MAMMO BREAST STEREOTACTIC BREAST BIOPSY LEFT Left 05/09/2022    benign    SHOULDER SURGERY Right     Dr Guevara     sinus cleaning      TONSILLECTOMY      TUBAL LIGATION      uterine suspension      VEIN LIGATION AND STRIPPING Bilateral      Family History   Problem Relation Age of Onset    Cancer Mother     Breast cancer Mother 65    Heart disease Father     Stroke Father     Hypertension Father     Hypertension Sister     Diabetes Daughter     Alzheimer's disease Maternal Grandmother     Breast cancer Maternal Grandmother         age unknown     Social History     Socioeconomic History    Marital status:      Spouse name: william    Occupational History    Occupation: retired     Tobacco Use    Smoking status: Never    Smokeless tobacco: Never   Substance and Sexual Activity    Alcohol use: No    Drug use: No    Sexual activity: Not Currently     Partners: Male   Social History Narrative    ** Merged History Encounter **          Review of patient's allergies indicates:   Allergen Reactions    Cyclobenzaprine Hallucinations    Gabapentin Edema and Other (See Comments)    Opioids - morphine analogues Anxiety and Hallucinations    Penicillins Hives     Other reaction(s): Unknown    Ceftin [cefuroxime axetil] Nausea And Vomiting    Morphine     Neurontin [gabapentin] Swelling    Opioids-meperidine and related Other (See Comments)     AMS    Oxycodone     Oxycodone-acetaminophen Hallucinations    Ceftin [cefuroxime axetil] Rash     "Penicillins Rash           Objective:   /76 (BP Location: Left arm, Patient Position: Sitting, BP Method: Large (Automatic))   Pulse 73   Resp 16   Ht 5' 3" (1.6 m)   Wt 103.9 kg (229 lb 0.9 oz)   BMI 40.58 kg/m²   Immunization History   Administered Date(s) Administered    COVID-19, MRNA, LN-S, PF (Pfizer) (Gray Cap) 05/06/2022    COVID-19, MRNA, LN-S, PF (Pfizer) (Purple Cap) 01/08/2021, 01/29/2021, 10/01/2021    COVID-19, mRNA, LNP-S, bivalent booster, PF (PFIZER OMICRON) 10/28/2022    Influenza 10/18/2008, 10/10/2011, 10/02/2012, 09/18/2018    Influenza (FLUAD) - Quadrivalent - Adjuvanted - PF *Preferred* (65+) 10/17/2022    Influenza - High Dose - PF (65 years and older) 11/12/2015, 10/04/2016, 09/13/2018, 09/10/2019, 10/25/2021    Influenza - Quadrivalent - High Dose - PF (65 years and older) 09/23/2020    Influenza - Quadrivalent - PF *Preferred* (6 months and older) 09/21/2017, 09/13/2018    Influenza - Trivalent (ADULT) 10/18/2008, 10/10/2011, 10/02/2012    Pneumococcal Conjugate - 13 Valent 01/01/2009    Pneumococcal Polysaccharide - 23 Valent 07/07/2014    Tdap 02/24/2014, 09/01/2015    Zoster 10/14/2013          Recent Results (from the past 672 hour(s))   Comprehensive Metabolic Panel    Collection Time: 04/06/23 12:08 PM   Result Value Ref Range    Sodium 146 (H) 136 - 145 mmol/L    Potassium 3.5 3.5 - 5.1 mmol/L    Chloride 105 95 - 110 mmol/L    CO2 28 23 - 29 mmol/L    Glucose 112 (H) 70 - 110 mg/dL    BUN 24 (H) 8 - 23 mg/dL    Creatinine 1.0 0.5 - 1.4 mg/dL    Calcium 10.2 8.7 - 10.5 mg/dL    Total Protein 7.5 6.0 - 8.4 g/dL    Albumin 4.0 3.5 - 5.2 g/dL    Total Bilirubin 0.9 0.1 - 1.0 mg/dL    Alkaline Phosphatase 100 55 - 135 U/L    AST 21 10 - 40 U/L    ALT 24 10 - 44 U/L    Anion Gap 13 8 - 16 mmol/L    eGFR 59 (A) >60 mL/min/1.73 m^2        No results found for: TBGOLDPLUS   Lab Results   Component Value Date    HEPAIGM Negative 11/19/2013    HEPBIGM Negative 11/19/2013    HEPCAB " Negative 04/11/2017      DEXA 04/01/2022:  L1-L3 -0.2, FN-2.2, TH-1.3, 28.0% increase in spine,-6.2% decline in hip 18.2% major FRAX, hip FRAX 4.2%    Assessment:       1. Osteoporosis, postmenopausal    2. Compression fx, thoracic spine, sequela    3. Rotator cuff tear arthropathy of right shoulder    4. Sarcoid    5. Factor V Leiden    6. Spondylosis of lumbar region without myelopathy or radiculopathy              Impression:     Osteoporosis: tx with prolia since at least 2013 w improved bmd; h/o vertebral fxs a R shoulder fx, taking daily vit D no ca 1000mg/day supplement   DEXA April 2022 increase in Spine (arthritis vs sx?), stable at hip.  Osteopenia scores.       Chronic back pain: fusion in 2018, see by pain mgt, stimulator in place, allergy to all narcotics      Chronic r shoulder pain: s/p rtc damage and injury in 2020 (fx); injected by ortho q 3 mos.  Now using medicinal marijuana.     H/o sarcoid (skin and lung involvement): nodules not sarcoid by bx, no sign of activity today ; h/o hcq and mtx-questionable diagnosis.  Says she went to [place] for evaluation and was told she did not have sarcoid. Elevated ACE level 8/9/22 of 125.  Follows with Dr. Kim in Pulmonology.CT chest 8/22 without evidence of active sarcoid.  Referral was sent to Dermatology for 2nd opinion on skin lesions.       Factor V leiden def: on eliquis chronically .  Now on 5 mg b.i.d..  Follows with Dr. Melo Colin.      Med monitoring: no issues w prolia     Hypokalemia  Possibly secondary to diuretic. Now on potassium chloride supplement .  Most recent level normal.    Plan:          Prolia today.     Continue vitamin-D supplementation.  Weight-bearing activities as tolerated.  Caution to avoid falls.    Repeat Dexa 4/2024    Derm referral is in. She can schedule whenever she is ready.     Topical Voltaren 3-4 times daily as needed for shoulder and knee pain.  Use pillows at nighttime for cushioning for comfort.    Continue  potassium per primary care     Follow up 6 months for Prolia with CMP 1 week prior in [place].     Brandy Bernal PA-C  [facility]  Rheumatology     30 minutes of total time spent on the encounter, which includes face to face time and non-face to face time preparing to see the patient (eg, review of tests), Obtaining and/or reviewing separately obtained history, Documenting clinical information in the electronic or other health record, Independently interpreting results (not separately reported) and communicating results to the patient/family/caregiver, or Care coordination (not separately reported).

## 2023-04-11 ENCOUNTER — OFFICE VISIT (OUTPATIENT)
Dept: RHEUMATOLOGY | Facility: CLINIC | Age: 73
End: 2023-04-11
Payer: MEDICARE

## 2023-04-11 ENCOUNTER — INFUSION (OUTPATIENT)
Dept: INFUSION THERAPY | Facility: HOSPITAL | Age: 73
End: 2023-04-11
Attending: INTERNAL MEDICINE
Payer: MEDICARE

## 2023-04-11 VITALS
DIASTOLIC BLOOD PRESSURE: 76 MMHG | SYSTOLIC BLOOD PRESSURE: 124 MMHG | BODY MASS INDEX: 40.59 KG/M2 | RESPIRATION RATE: 16 BRPM | WEIGHT: 229.06 LBS | HEIGHT: 63 IN | HEART RATE: 73 BPM

## 2023-04-11 VITALS
OXYGEN SATURATION: 99 % | SYSTOLIC BLOOD PRESSURE: 126 MMHG | HEART RATE: 65 BPM | TEMPERATURE: 98 F | RESPIRATION RATE: 18 BRPM | DIASTOLIC BLOOD PRESSURE: 72 MMHG

## 2023-04-11 DIAGNOSIS — M47.816 SPONDYLOSIS OF LUMBAR REGION WITHOUT MYELOPATHY OR RADICULOPATHY: ICD-10-CM

## 2023-04-11 DIAGNOSIS — D86.9 SARCOID: ICD-10-CM

## 2023-04-11 DIAGNOSIS — M81.0 OSTEOPOROSIS, POSTMENOPAUSAL: Primary | ICD-10-CM

## 2023-04-11 DIAGNOSIS — S22.000S COMPRESSION FX, THORACIC SPINE, SEQUELA: ICD-10-CM

## 2023-04-11 DIAGNOSIS — D68.51 FACTOR V LEIDEN: ICD-10-CM

## 2023-04-11 DIAGNOSIS — M12.811 ROTATOR CUFF TEAR ARTHROPATHY OF RIGHT SHOULDER: ICD-10-CM

## 2023-04-11 DIAGNOSIS — M75.101 ROTATOR CUFF TEAR ARTHROPATHY OF RIGHT SHOULDER: ICD-10-CM

## 2023-04-11 DIAGNOSIS — M81.0 AGE-RELATED OSTEOPOROSIS WITHOUT CURRENT PATHOLOGICAL FRACTURE: Primary | ICD-10-CM

## 2023-04-11 PROCEDURE — 1159F PR MEDICATION LIST DOCUMENTED IN MEDICAL RECORD: ICD-10-PCS | Mod: CPTII,S$GLB,,

## 2023-04-11 PROCEDURE — 3078F DIAST BP <80 MM HG: CPT | Mod: CPTII,S$GLB,,

## 2023-04-11 PROCEDURE — 1101F PR PT FALLS ASSESS DOC 0-1 FALLS W/OUT INJ PAST YR: ICD-10-PCS | Mod: CPTII,S$GLB,,

## 2023-04-11 PROCEDURE — 3074F PR MOST RECENT SYSTOLIC BLOOD PRESSURE < 130 MM HG: ICD-10-PCS | Mod: CPTII,S$GLB,,

## 2023-04-11 PROCEDURE — 1157F PR ADVANCE CARE PLAN OR EQUIV PRESENT IN MEDICAL RECORD: ICD-10-PCS | Mod: CPTII,S$GLB,,

## 2023-04-11 PROCEDURE — 3288F PR FALLS RISK ASSESSMENT DOCUMENTED: ICD-10-PCS | Mod: CPTII,S$GLB,,

## 2023-04-11 PROCEDURE — 1125F PR PAIN SEVERITY QUANTIFIED, PAIN PRESENT: ICD-10-PCS | Mod: CPTII,S$GLB,,

## 2023-04-11 PROCEDURE — 99214 OFFICE O/P EST MOD 30 MIN: CPT | Mod: S$GLB,,,

## 2023-04-11 PROCEDURE — 3288F FALL RISK ASSESSMENT DOCD: CPT | Mod: CPTII,S$GLB,,

## 2023-04-11 PROCEDURE — 1125F AMNT PAIN NOTED PAIN PRSNT: CPT | Mod: CPTII,S$GLB,,

## 2023-04-11 PROCEDURE — 1160F RVW MEDS BY RX/DR IN RCRD: CPT | Mod: CPTII,S$GLB,,

## 2023-04-11 PROCEDURE — 1159F MED LIST DOCD IN RCRD: CPT | Mod: CPTII,S$GLB,,

## 2023-04-11 PROCEDURE — 1157F ADVNC CARE PLAN IN RCRD: CPT | Mod: CPTII,S$GLB,,

## 2023-04-11 PROCEDURE — 3074F SYST BP LT 130 MM HG: CPT | Mod: CPTII,S$GLB,,

## 2023-04-11 PROCEDURE — 99214 PR OFFICE/OUTPT VISIT, EST, LEVL IV, 30-39 MIN: ICD-10-PCS | Mod: S$GLB,,,

## 2023-04-11 PROCEDURE — 99999 PR PBB SHADOW E&M-EST. PATIENT-LVL V: ICD-10-PCS | Mod: PBBFAC,,,

## 2023-04-11 PROCEDURE — 99999 PR PBB SHADOW E&M-EST. PATIENT-LVL V: CPT | Mod: PBBFAC,,,

## 2023-04-11 PROCEDURE — 1101F PT FALLS ASSESS-DOCD LE1/YR: CPT | Mod: CPTII,S$GLB,,

## 2023-04-11 PROCEDURE — 96372 THER/PROPH/DIAG INJ SC/IM: CPT

## 2023-04-11 PROCEDURE — 1160F PR REVIEW ALL MEDS BY PRESCRIBER/CLIN PHARMACIST DOCUMENTED: ICD-10-PCS | Mod: CPTII,S$GLB,,

## 2023-04-11 PROCEDURE — 63600175 PHARM REV CODE 636 W HCPCS: Mod: JZ,JG

## 2023-04-11 PROCEDURE — 3078F PR MOST RECENT DIASTOLIC BLOOD PRESSURE < 80 MM HG: ICD-10-PCS | Mod: CPTII,S$GLB,,

## 2023-04-11 RX ADMIN — DENOSUMAB 60 MG: 60 INJECTION SUBCUTANEOUS at 02:04

## 2023-04-11 NOTE — PATIENT INSTRUCTIONS
Apply topical Voltaren/diclofenac to affected joint 3-4 times daily.         Biotene products for dry mouth

## 2023-05-24 NOTE — NURSING
Injection given without difficulties.Bandaid applied. Patient instructed to stay in the clinic for 15 minutes. Patient verbalized understanding and will notify nurse with any complaints.    
None known

## 2023-05-25 ENCOUNTER — OFFICE VISIT (OUTPATIENT)
Dept: FAMILY MEDICINE | Facility: CLINIC | Age: 73
End: 2023-05-25
Payer: MEDICARE

## 2023-05-25 VITALS
DIASTOLIC BLOOD PRESSURE: 82 MMHG | WEIGHT: 232.56 LBS | SYSTOLIC BLOOD PRESSURE: 124 MMHG | BODY MASS INDEX: 41.21 KG/M2 | HEART RATE: 61 BPM | RESPIRATION RATE: 16 BRPM | HEIGHT: 63 IN | OXYGEN SATURATION: 96 %

## 2023-05-25 DIAGNOSIS — F33.42 RECURRENT MAJOR DEPRESSIVE DISORDER, IN FULL REMISSION: ICD-10-CM

## 2023-05-25 DIAGNOSIS — E27.40 ADRENAL INSUFFICIENCY: ICD-10-CM

## 2023-05-25 DIAGNOSIS — I48.0 PAROXYSMAL ATRIAL FIBRILLATION: ICD-10-CM

## 2023-05-25 DIAGNOSIS — D84.9 DEFICIENCY SYNDROME, IMMUNOLOGIC: ICD-10-CM

## 2023-05-25 DIAGNOSIS — R05.3 CHRONIC COUGH: ICD-10-CM

## 2023-05-25 DIAGNOSIS — Z00.00 MEDICARE ANNUAL WELLNESS VISIT, SUBSEQUENT: ICD-10-CM

## 2023-05-25 DIAGNOSIS — E66.01 CLASS 3 SEVERE OBESITY DUE TO EXCESS CALORIES WITH SERIOUS COMORBIDITY IN ADULT, UNSPECIFIED BMI: ICD-10-CM

## 2023-05-25 DIAGNOSIS — D50.9 IRON DEFICIENCY ANEMIA, UNSPECIFIED IRON DEFICIENCY ANEMIA TYPE: ICD-10-CM

## 2023-05-25 DIAGNOSIS — R09.81 NASAL CONGESTION: ICD-10-CM

## 2023-05-25 DIAGNOSIS — G31.84 MCI (MILD COGNITIVE IMPAIRMENT): ICD-10-CM

## 2023-05-25 DIAGNOSIS — N18.31 STAGE 3A CHRONIC KIDNEY DISEASE: Primary | ICD-10-CM

## 2023-05-25 DIAGNOSIS — G89.4 CHRONIC PAIN DISORDER: ICD-10-CM

## 2023-05-25 DIAGNOSIS — E78.2 MIXED HYPERLIPIDEMIA: ICD-10-CM

## 2023-05-25 DIAGNOSIS — K75.81 NASH (NONALCOHOLIC STEATOHEPATITIS): ICD-10-CM

## 2023-05-25 PROBLEM — E66.813 CLASS 3 SEVERE OBESITY DUE TO EXCESS CALORIES WITH SERIOUS COMORBIDITY IN ADULT, UNSPECIFIED BMI: Status: ACTIVE | Noted: 2023-05-25

## 2023-05-25 PROBLEM — F33.1 MODERATE EPISODE OF RECURRENT MAJOR DEPRESSIVE DISORDER: Status: ACTIVE | Noted: 2023-05-25

## 2023-05-25 PROCEDURE — 3288F FALL RISK ASSESSMENT DOCD: CPT | Mod: CPTII,S$GLB,, | Performed by: INTERNAL MEDICINE

## 2023-05-25 PROCEDURE — 1101F PR PT FALLS ASSESS DOC 0-1 FALLS W/OUT INJ PAST YR: ICD-10-PCS | Mod: CPTII,S$GLB,, | Performed by: INTERNAL MEDICINE

## 2023-05-25 PROCEDURE — 99999 PR PBB SHADOW E&M-EST. PATIENT-LVL IV: ICD-10-PCS | Mod: PBBFAC,,, | Performed by: INTERNAL MEDICINE

## 2023-05-25 PROCEDURE — 1159F PR MEDICATION LIST DOCUMENTED IN MEDICAL RECORD: ICD-10-PCS | Mod: CPTII,S$GLB,, | Performed by: INTERNAL MEDICINE

## 2023-05-25 PROCEDURE — 3079F PR MOST RECENT DIASTOLIC BLOOD PRESSURE 80-89 MM HG: ICD-10-PCS | Mod: CPTII,S$GLB,, | Performed by: INTERNAL MEDICINE

## 2023-05-25 PROCEDURE — 1159F MED LIST DOCD IN RCRD: CPT | Mod: CPTII,S$GLB,, | Performed by: INTERNAL MEDICINE

## 2023-05-25 PROCEDURE — 99999 PR PBB SHADOW E&M-EST. PATIENT-LVL IV: CPT | Mod: PBBFAC,,, | Performed by: INTERNAL MEDICINE

## 2023-05-25 PROCEDURE — 1157F ADVNC CARE PLAN IN RCRD: CPT | Mod: CPTII,S$GLB,, | Performed by: INTERNAL MEDICINE

## 2023-05-25 PROCEDURE — 3044F PR MOST RECENT HEMOGLOBIN A1C LEVEL <7.0%: ICD-10-PCS | Mod: CPTII,S$GLB,, | Performed by: INTERNAL MEDICINE

## 2023-05-25 PROCEDURE — 1101F PT FALLS ASSESS-DOCD LE1/YR: CPT | Mod: CPTII,S$GLB,, | Performed by: INTERNAL MEDICINE

## 2023-05-25 PROCEDURE — 1157F PR ADVANCE CARE PLAN OR EQUIV PRESENT IN MEDICAL RECORD: ICD-10-PCS | Mod: CPTII,S$GLB,, | Performed by: INTERNAL MEDICINE

## 2023-05-25 PROCEDURE — 99214 PR OFFICE/OUTPT VISIT, EST, LEVL IV, 30-39 MIN: ICD-10-PCS | Mod: S$GLB,,, | Performed by: INTERNAL MEDICINE

## 2023-05-25 PROCEDURE — 99214 OFFICE O/P EST MOD 30 MIN: CPT | Mod: S$GLB,,, | Performed by: INTERNAL MEDICINE

## 2023-05-25 PROCEDURE — 3044F HG A1C LEVEL LT 7.0%: CPT | Mod: CPTII,S$GLB,, | Performed by: INTERNAL MEDICINE

## 2023-05-25 PROCEDURE — 1160F PR REVIEW ALL MEDS BY PRESCRIBER/CLIN PHARMACIST DOCUMENTED: ICD-10-PCS | Mod: CPTII,S$GLB,, | Performed by: INTERNAL MEDICINE

## 2023-05-25 PROCEDURE — 1160F RVW MEDS BY RX/DR IN RCRD: CPT | Mod: CPTII,S$GLB,, | Performed by: INTERNAL MEDICINE

## 2023-05-25 PROCEDURE — 3074F PR MOST RECENT SYSTOLIC BLOOD PRESSURE < 130 MM HG: ICD-10-PCS | Mod: CPTII,S$GLB,, | Performed by: INTERNAL MEDICINE

## 2023-05-25 PROCEDURE — 3288F PR FALLS RISK ASSESSMENT DOCUMENTED: ICD-10-PCS | Mod: CPTII,S$GLB,, | Performed by: INTERNAL MEDICINE

## 2023-05-25 PROCEDURE — 3079F DIAST BP 80-89 MM HG: CPT | Mod: CPTII,S$GLB,, | Performed by: INTERNAL MEDICINE

## 2023-05-25 PROCEDURE — 3074F SYST BP LT 130 MM HG: CPT | Mod: CPTII,S$GLB,, | Performed by: INTERNAL MEDICINE

## 2023-05-25 RX ORDER — LEVOCETIRIZINE DIHYDROCHLORIDE 5 MG/1
5 TABLET, FILM COATED ORAL NIGHTLY
COMMUNITY

## 2023-05-25 RX ORDER — FLUNISOLIDE 0.25 MG/ML
2 SOLUTION NASAL DAILY
Qty: 25 ML | Refills: 1 | Status: SHIPPED | OUTPATIENT
Start: 2023-05-25 | End: 2023-10-02

## 2023-05-25 RX ORDER — BUDESONIDE AND FORMOTEROL FUMARATE DIHYDRATE 80; 4.5 UG/1; UG/1
2 AEROSOL RESPIRATORY (INHALATION) 2 TIMES DAILY
Qty: 1 G | Refills: 5 | Status: SHIPPED | OUTPATIENT
Start: 2023-05-25 | End: 2024-05-24

## 2023-05-25 RX ORDER — ALBUTEROL SULFATE 90 UG/1
2 AEROSOL, METERED RESPIRATORY (INHALATION) EVERY 4 HOURS PRN
Qty: 18 G | Refills: 1 | Status: SHIPPED | OUTPATIENT
Start: 2023-05-25 | End: 2023-11-13 | Stop reason: SDUPTHER

## 2023-05-25 NOTE — PROGRESS NOTES
Subjective:       Patient ID: Cori Avalos is a 73 y.o. female.    Chief Complaint: Follow-up   Follow-up  Pertinent negatives include no chest pain or joint swelling.     Gyn: Dr Hunt 2022  MMG:  10/22  Dexa: 2019-osteopenia -1.5 & -1.9  w fragility fracture. Tx:  Prolia mgmt in [place] at rheum MD  Colonoscopy:   2/25/19 adenoma r/c 3 yr Dr Jeff   Immunizations: Flu: Tdap: UC 2021 cat bite Pneumovax: 2014 Prevnar 13: 2009 Shingles: old  Covid: yes   Smoker:  Never   Eye: [place]        July 3 - Dr Jeff colonoscopy         Right leg vein procedure  w cardio Dr Colin. Will see him next wk      MCI w memory loss, confusion: improved off Valium, Elavil.  and getting cpap adjusted.       Hx of hospital admit w unresponsiveness, elevated carbon monoxide poisoning thought due to sleep apnea and sedatives. Wasn't using cpap w naps.        OAB incontinence bladder and bowel, frequent UTI.  + depends      Depression: mproved since last visit. Got new dog. Helpful. Rx Cymbalta 90     HTN: controlled Rx metoprolol 50    A fib: + loop recorder// Rx; Eliquis & Metoprolol  /Cardio: Dr Colin     CKD stage 3:  GFR 50s-     Anemia: iron def. Hb 13. Lowest iron Sat 14. Recommend 2-3 x wkly iron supplement.      RLS:  Controlled Rx Requip 5    Back DDD; s/p injections//Dr Flanagan has pain stimulator. //Rx wean downed to 2 from 10 mg Valium nightly muscle relaxer Dr Bocanegra    Chronic arthralgias:  Shoulders are issue bicep tears, causing diff ADLs. Rx medical [facility] pain mgmt Dr Bocanegra   //helping w pain- shoulder and knee pain         Cutaneous sarcoid: c/o hard nodules under skin- some questions if truly dx.  Told outside  was not sarcoid.    Allergies:  Controlled Rx astelin, Flunisolide nasal, H1     KENROY: improved new study and auto Cpap w o2.  //mgmt  Dr ARLEEN Soto     GERD: controlled Rx Nexium 40       ____________________________________________________________________________________________________  Assessment & Plan:  1. Stage 3a chronic kidney disease    2. Nasal congestion  - flunisolide 25 mcg, 0.025%, (NASALIDE) 25 mcg (0.025 %) Spry; 2 sprays by Nasal route once daily.  Dispense: 25 mL; Refill: 1    3. Cough  - albuterol (PROVENTIL/VENTOLIN HFA) 90 mcg/actuation inhaler; Inhale 2 puffs into the lungs every 4 (four) hours as needed for Wheezing or Shortness of Breath.  Dispense: 18 g; Refill: 1    4. Recurrent major depressive disorder, in full remission    5. Paroxysmal atrial fibrillation    6. BLOOM (nonalcoholic steatohepatitis)    7. Medicare annual wellness visit, subsequent  - Comprehensive Metabolic Panel; Future  - Lipid Panel; Future    8. Mixed hyperlipidemia  - Comprehensive Metabolic Panel; Future  - Lipid Panel; Future    9. Class 3 severe obesity due to excess calories with serious comorbidity in adult, unspecified BMI    10. Adrenal insufficiency    11. Deficiency syndrome, immunologic    12. MCI (mild cognitive impairment)    13. Chronic pain disorder    14. Iron deficiency anemia, unspecified iron deficiency anemia type     Stage 3a chronic kidney disease    Nasal congestion  -     flunisolide 25 mcg, 0.025%, (NASALIDE) 25 mcg (0.025 %) Spry; 2 sprays by Nasal route once daily.  Dispense: 25 mL; Refill: 1    Cough  -     albuterol (PROVENTIL/VENTOLIN HFA) 90 mcg/actuation inhaler; Inhale 2 puffs into the lungs every 4 (four) hours as needed for Wheezing or Shortness of Breath.  Dispense: 18 g; Refill: 1    Recurrent major depressive disorder, in full remission    Paroxysmal atrial fibrillation    BLOOM (nonalcoholic steatohepatitis)    Medicare annual wellness visit, subsequent  -     Comprehensive Metabolic Panel; Future; Expected date: 05/25/2023  -     Lipid Panel; Future; Expected date: 05/25/2023    Mixed hyperlipidemia  -     Comprehensive Metabolic Panel; Future; Expected date:  05/25/2023  -     Lipid Panel; Future; Expected date: 05/25/2023    Class 3 severe obesity due to excess calories with serious comorbidity in adult, unspecified BMI    Adrenal insufficiency    Deficiency syndrome, immunologic    MCI (mild cognitive impairment)    Chronic pain disorder    Iron deficiency anemia, unspecified iron deficiency anemia type    Other orders  -     budesonide-formoterol 80-4.5 mcg (SYMBICORT) 80-4.5 mcg/actuation HFAA; Inhale 2 puffs into the lungs 2 (two) times a day. Controller  Dispense: 1 g; Refill: 5        Continue to work on regular exercise, maintain healthy weight, balanced diet. Avoid unhealthy habits: smoking, excessive alcohol intake.     Disclaimer: This note was partly generated using dictation software which may occasionally result in transcription errors  ____________________________________________________________________________________________________  Review of Systems:  Review of Systems   Constitutional:  Negative for appetite change.   HENT:  Negative for nosebleeds.    Eyes:  Negative for pain.   Respiratory:  Negative for choking.    Cardiovascular:  Negative for chest pain.   Gastrointestinal:  Negative for blood in stool.   Genitourinary:  Negative for hematuria.   Musculoskeletal:  Negative for joint swelling.   Skin:  Negative for pallor.   Neurological:  Negative for facial asymmetry.   Hematological:  Does not bruise/bleed easily.   Psychiatric/Behavioral:  Negative for confusion.      Objective:     Wt Readings from Last 3 Encounters:   05/25/23 105.5 kg (232 lb 9.4 oz)   05/16/23 104.2 kg (229 lb 11.5 oz)   04/11/23 103.9 kg (229 lb 0.9 oz)     BP Readings from Last 3 Encounters:   05/25/23 124/82   05/16/23 (!) 103/42   04/11/23 126/72       Lab Results   Component Value Date    WBC 6.75 05/16/2023    HGB 13.2 05/16/2023    HCT 38.1 05/16/2023     05/16/2023     05/16/2023    K 3.7 05/16/2023     05/16/2023    ALT 24 04/06/2023    AST 21  04/06/2023    CO2 32 (H) 05/16/2023    CREATININE 1.04 05/16/2023    BUN 24 (H) 05/16/2023     05/16/2023      Hemoglobin A1C   Date Value Ref Range Status   05/16/2023 4.9 0.0 - 5.6 % Final     Comment:     Reference Interval:  5.0 - 5.6 Normal   5.7 - 6.4 High Risk   > 6.5 Diabetic      Hgb A1c results are standardized based on the (NGSP) National   Glycohemoglobin Standardization Program.      Hemoglobin A1C levels are related to mean serum/plasma glucose   during the preceding 2-3 months.        04/06/2021 5.2 0.0 - 5.6 % Final     Comment:     Reference Interval:  5.0 - 5.6 Normal   5.7 - 6.4 High Risk   > 6.5 Diabetic      Hgb A1c results are standardized based on the (NGSP) National   Glycohemoglobin Standardization Program.      Hemoglobin A1C levels are related to mean serum/plasma glucose   during the preceding 2-3 months.        08/15/2017 5.2 4.0 - 5.6 % Final     Comment:     According to ADA guidelines, hemoglobin A1c <7.0% represents  optimal control in non-pregnant diabetic patients. Different  metrics may apply to specific patient populations.   Standards of Medical Care in Diabetes-2016.  For the purpose of screening for the presence of diabetes:  <5.7%     Consistent with the absence of diabetes  5.7-6.4%  Consistent with increasing risk for diabetes   (prediabetes)  >or=6.5%  Consistent with diabetes  Currently, no consensus exists for use of hemoglobin A1c  for diagnosis of diabetes for children.  This Hemoglobin A1c assay has significant interference with fetal   hemoglobin   (HbF). The results are invalid for patients with abnormal amounts of   HbF,   including those with known Hereditary Persistence   of Fetal Hemoglobin. Heterozygous hemoglobin variants (HbAS, HbAC,   HbAD, HbAE, HbA2) do not significantly interfere with this assay;   however, presence of multiple variants in a sample may impact the %   interference.        Lab Results   Component Value Date    TSH 3.761 08/25/2022     TSH 1.512 08/05/2021    TSH 0.707 04/06/2021     Lab Results   Component Value Date    FREET4 1.36 05/07/2020    FREET4 0.90 01/14/2013    FREET4 1.09 06/19/2012     Lab Results   Component Value Date    LDLCALC 61 02/12/2022    LDLCALC 59.8 (L) 04/06/2021    LDLCALC 79.4 08/29/2020     Lab Results   Component Value Date    TRIG 131 02/12/2022    TRIG 61 04/06/2021    TRIG 88 08/29/2020            Physical Exam  Constitutional:       Appearance: Normal appearance.   HENT:      Head: Normocephalic and atraumatic.   Eyes:      Extraocular Movements: Extraocular movements intact.      Pupils: Pupils are equal, round, and reactive to light.   Cardiovascular:      Rate and Rhythm: Normal rate.   Pulmonary:      Effort: Pulmonary effort is normal.   Neurological:      Mental Status: She is alert and oriented to person, place, and time.   Psychiatric:         Mood and Affect: Mood normal.       Medication List with Changes/Refills   Current Medications    APIXABAN (ELIQUIS) 5 MG TAB    Take 5 mg by mouth 2 (two) times daily.    CALCIUM 500 500 MG CALCIUM (1,250 MG) TABLET    Take 1 tablet by mouth once daily.    CANNABIDIOL (EPIDIOLEX) 100 MG/ML    5 mg/kg nightly. 0.5 tinture    CHOLECALCIFEROL, VITAMIN D3, (VITAMIN D3) 25 MCG (1,000 UNIT) CAPSULE    Take 1,000 Units by mouth once daily.    CHOLESTYRAMINE (QUESTRAN) 4 GRAM PACKET    Take 4 g by mouth once daily.    DENOSUMAB (PROLIA) 60 MG/ML SYRG    Inject 60 mg into the skin every 6 (six) months.     DIPHENOXYLATE-ATROPINE 2.5-0.025 MG (LOMOTIL) 2.5-0.025 MG PER TABLET    TAKE ONE TABLET BY MOUTH FOUR TIMES DAILY AS NEEDED FOR DIARRHEA    DULOXETINE (CYMBALTA) 30 MG CAPSULE    TAKE ONE CAPSULE BY MOUTH EVERY DAY    DULOXETINE (CYMBALTA) 60 MG CAPSULE    TAKE 1 CAPSULE BY MOUTH ONCE DAILY.    FUROSEMIDE (LASIX) 20 MG TABLET    Take 20 mg by mouth every other day.     GUAIFENESIN (MUCINEX) 600 MG 12 HR TABLET    Take 1,200 mg by mouth 2 (two) times daily as needed.      IBUPROFEN (ADVIL,MOTRIN) 800 MG TABLET    Take 800 mg by mouth every 6 (six) hours as needed.     LEVOCETIRIZINE (XYZAL) 5 MG TABLET    Take 5 mg by mouth every evening.    METOPROLOL TARTRATE (LOPRESSOR) 50 MG TABLET    Take 75 mg by mouth 2 (two) times daily. Pt taking 1 & a half tablets twice a day.    NYSTATIN-TRIAMCINOLONE (MYCOLOG II) CREAM    Apply 1 g topically as needed.    ONDANSETRON (ZOFRAN-ODT) 4 MG TBDL    Take 1 tablet (4 mg total) by mouth every 8 (eight) hours as needed.    PANTOPRAZOLE (PROTONIX) 20 MG TABLET    Take 20 mg by mouth once daily.    POTASSIUM CHLORIDE (KLOR-CON) 10 MEQ TBSR    Take 1 tablet (10 mEq total) by mouth every other day. Take w Lasix dose    PRAMIPEXOLE (MIRAPEX) 0.5 MG TABLET    Take 1.5 mg by mouth 2 (two) times a day.   Changed and/or Refilled Medications    Modified Medication Previous Medication    ALBUTEROL (PROVENTIL/VENTOLIN HFA) 90 MCG/ACTUATION INHALER albuterol (PROVENTIL/VENTOLIN HFA) 90 mcg/actuation inhaler       Inhale 2 puffs into the lungs every 4 (four) hours as needed for Wheezing or Shortness of Breath.    Inhale 2 puffs into the lungs every 4 (four) hours as needed for Wheezing or Shortness of Breath.    BUDESONIDE-FORMOTEROL 80-4.5 MCG (SYMBICORT) 80-4.5 MCG/ACTUATION HFAA budesonide-formoterol 80-4.5 mcg (SYMBICORT) 80-4.5 mcg/actuation HFAA       Inhale 2 puffs into the lungs 2 (two) times a day. Controller    Inhale 2 puffs into the lungs 2 (two) times a day. Controller    FLUNISOLIDE 25 MCG, 0.025%, (NASALIDE) 25 MCG (0.025 %) SPRY flunisolide 25 mcg, 0.025%, (NASALIDE) 25 mcg (0.025 %) Spry       2 sprays by Nasal route once daily.    USE 2 sprays IN EACH NOSTRIL EVERY DAY   Discontinued Medications    ALBUTEROL SULFATE 2.5 MG/0.5 ML NEBU    Take 2.5 mg by nebulization every 4 (four) hours as needed. Rescue    BENZONATATE (TESSALON) 100 MG CAPSULE    Take 100 mg by mouth 3 (three) times daily as needed.     ROPINIROLE (REQUIP) 5 MG TABLET    Take 1  tablet (5 mg total) by mouth nightly.

## 2023-07-07 RX ORDER — DULOXETIN HYDROCHLORIDE 30 MG/1
30 CAPSULE, DELAYED RELEASE ORAL DAILY
Qty: 90 CAPSULE | Refills: 2 | Status: SHIPPED | OUTPATIENT
Start: 2023-07-07 | End: 2024-03-14

## 2023-07-07 NOTE — TELEPHONE ENCOUNTER
No care due was identified.  [facility] Embedded Care Due Messages. Reference number: [id number].   7/07/2023 8:02:14 AM CDT

## 2023-07-07 NOTE — TELEPHONE ENCOUNTER
Refill Routing Note   Medication(s) are not appropriate for processing by [facility] for the following reason(s):      Drug-disease interaction    [facility] action(s):  Defer None identified   Medication Therapy Plan: Drug-Disease: DULoxetine and BLOOM (nonalcoholic steatohepatitis)      Appointments  past 12m or future 3m with PCP    Date Provider   Last Visit   5/25/2023 Arnoldo Miller MD   Next Visit   10/26/2023 Arnoldo Miller MD   ED visits in past 90 days: 0        Note composed:10:20 AM 07/07/2023

## 2023-09-25 RX ORDER — DULOXETIN HYDROCHLORIDE 60 MG/1
CAPSULE, DELAYED RELEASE ORAL
Qty: 90 CAPSULE | Refills: 3 | Status: SHIPPED | OUTPATIENT
Start: 2023-09-25

## 2023-09-25 NOTE — TELEPHONE ENCOUNTER
No care due was identified.  [facility] Embedded Care Due Messages. Reference number: [id number].   9/25/2023 1:42:40 PM CDT

## 2023-09-27 ENCOUNTER — TELEPHONE (OUTPATIENT)
Dept: RHEUMATOLOGY | Facility: CLINIC | Age: 73
End: 2023-09-27
Payer: MEDICARE

## 2023-09-27 NOTE — TELEPHONE ENCOUNTER
I spke w/ the pt and informed pt that we don't have any mid morning appts times that day and following.     Pt stated she will keep appt as scheduled.    I informed pt if a pt happens to cancel the day of I will call her that morning to see if she can come in sooner.    Pt understanding was verbalized.

## 2023-09-27 NOTE — TELEPHONE ENCOUNTER
----- Message from Esther Nolasco sent at 9/27/2023 12:48 PM CDT -----  Contact: Pt @[phone]  [email or web address]     Patient is calling for Medical Advice regarding:    Reschedule pt appt around 12 noon     Would like response via zappitt:  call back     Comments:   Pt is requesting a call back to advise if her appt that is on 10.11.23 can be rescheduled for an earlier time. Please advise.

## 2023-10-05 ENCOUNTER — LAB VISIT (OUTPATIENT)
Dept: LAB | Facility: HOSPITAL | Age: 73
End: 2023-10-05
Payer: MEDICARE

## 2023-10-05 DIAGNOSIS — M81.0 OSTEOPOROSIS, POSTMENOPAUSAL: ICD-10-CM

## 2023-10-05 LAB
ALBUMIN SERPL BCP-MCNC: 3.4 G/DL (ref 3.5–5.2)
ALP SERPL-CCNC: 86 U/L (ref 55–135)
ALT SERPL W/O P-5'-P-CCNC: 18 U/L (ref 10–44)
ANION GAP SERPL CALC-SCNC: 14 MMOL/L (ref 8–16)
AST SERPL-CCNC: 23 U/L (ref 10–40)
BILIRUB SERPL-MCNC: 0.8 MG/DL (ref 0.1–1)
BUN SERPL-MCNC: 23 MG/DL (ref 8–23)
CALCIUM SERPL-MCNC: 9.7 MG/DL (ref 8.7–10.5)
CHLORIDE SERPL-SCNC: 109 MMOL/L (ref 95–110)
CO2 SERPL-SCNC: 22 MMOL/L (ref 23–29)
CREAT SERPL-MCNC: 1 MG/DL (ref 0.5–1.4)
EST. GFR  (NO RACE VARIABLE): 59 ML/MIN/1.73 M^2
GLUCOSE SERPL-MCNC: 123 MG/DL (ref 70–110)
POTASSIUM SERPL-SCNC: 3.9 MMOL/L (ref 3.5–5.1)
PROT SERPL-MCNC: 7 G/DL (ref 6–8.4)
SODIUM SERPL-SCNC: 145 MMOL/L (ref 136–145)

## 2023-10-05 PROCEDURE — 80053 COMPREHEN METABOLIC PANEL: CPT | Mod: PO

## 2023-10-05 PROCEDURE — 36415 COLL VENOUS BLD VENIPUNCTURE: CPT | Mod: PO

## 2023-10-11 ENCOUNTER — OFFICE VISIT (OUTPATIENT)
Dept: RHEUMATOLOGY | Facility: CLINIC | Age: 73
End: 2023-10-11
Payer: MEDICARE

## 2023-10-11 ENCOUNTER — INFUSION (OUTPATIENT)
Dept: INFUSION THERAPY | Facility: HOSPITAL | Age: 73
End: 2023-10-11
Attending: INTERNAL MEDICINE
Payer: MEDICARE

## 2023-10-11 VITALS
OXYGEN SATURATION: 97 % | TEMPERATURE: 97 F | DIASTOLIC BLOOD PRESSURE: 72 MMHG | HEART RATE: 69 BPM | RESPIRATION RATE: 18 BRPM | SYSTOLIC BLOOD PRESSURE: 134 MMHG

## 2023-10-11 VITALS
RESPIRATION RATE: 16 BRPM | HEART RATE: 71 BPM | DIASTOLIC BLOOD PRESSURE: 97 MMHG | BODY MASS INDEX: 41.53 KG/M2 | SYSTOLIC BLOOD PRESSURE: 143 MMHG | HEIGHT: 63 IN | WEIGHT: 234.38 LBS

## 2023-10-11 DIAGNOSIS — M81.0 AGE-RELATED OSTEOPOROSIS WITHOUT CURRENT PATHOLOGICAL FRACTURE: Primary | ICD-10-CM

## 2023-10-11 DIAGNOSIS — S22.000S COMPRESSION FX, THORACIC SPINE, SEQUELA: ICD-10-CM

## 2023-10-11 DIAGNOSIS — M81.0 OSTEOPOROSIS, POSTMENOPAUSAL: Primary | ICD-10-CM

## 2023-10-11 DIAGNOSIS — D68.51 FACTOR V LEIDEN: ICD-10-CM

## 2023-10-11 DIAGNOSIS — D86.9 SARCOID: ICD-10-CM

## 2023-10-11 DIAGNOSIS — M12.811 ROTATOR CUFF TEAR ARTHROPATHY OF RIGHT SHOULDER: ICD-10-CM

## 2023-10-11 DIAGNOSIS — M47.816 SPONDYLOSIS OF LUMBAR REGION WITHOUT MYELOPATHY OR RADICULOPATHY: ICD-10-CM

## 2023-10-11 DIAGNOSIS — M75.101 ROTATOR CUFF TEAR ARTHROPATHY OF RIGHT SHOULDER: ICD-10-CM

## 2023-10-11 PROCEDURE — 1101F PT FALLS ASSESS-DOCD LE1/YR: CPT | Mod: CPTII,S$GLB,,

## 2023-10-11 PROCEDURE — 99999 PR PBB SHADOW E&M-EST. PATIENT-LVL IV: CPT | Mod: PBBFAC,,,

## 2023-10-11 PROCEDURE — 1101F PR PT FALLS ASSESS DOC 0-1 FALLS W/OUT INJ PAST YR: ICD-10-PCS | Mod: CPTII,S$GLB,,

## 2023-10-11 PROCEDURE — 3080F DIAST BP >= 90 MM HG: CPT | Mod: CPTII,S$GLB,,

## 2023-10-11 PROCEDURE — 3288F FALL RISK ASSESSMENT DOCD: CPT | Mod: CPTII,S$GLB,,

## 2023-10-11 PROCEDURE — 1125F PR PAIN SEVERITY QUANTIFIED, PAIN PRESENT: ICD-10-PCS | Mod: CPTII,S$GLB,,

## 2023-10-11 PROCEDURE — 63600175 PHARM REV CODE 636 W HCPCS: Mod: JZ,JG

## 2023-10-11 PROCEDURE — 3077F SYST BP >= 140 MM HG: CPT | Mod: CPTII,S$GLB,,

## 2023-10-11 PROCEDURE — 99214 PR OFFICE/OUTPT VISIT, EST, LEVL IV, 30-39 MIN: ICD-10-PCS | Mod: S$GLB,,,

## 2023-10-11 PROCEDURE — 1157F PR ADVANCE CARE PLAN OR EQUIV PRESENT IN MEDICAL RECORD: ICD-10-PCS | Mod: CPTII,S$GLB,,

## 2023-10-11 PROCEDURE — 1160F PR REVIEW ALL MEDS BY PRESCRIBER/CLIN PHARMACIST DOCUMENTED: ICD-10-PCS | Mod: CPTII,S$GLB,,

## 2023-10-11 PROCEDURE — 96372 THER/PROPH/DIAG INJ SC/IM: CPT

## 2023-10-11 PROCEDURE — 3044F HG A1C LEVEL LT 7.0%: CPT | Mod: CPTII,S$GLB,,

## 2023-10-11 PROCEDURE — 99999 PR PBB SHADOW E&M-EST. PATIENT-LVL IV: ICD-10-PCS | Mod: PBBFAC,,,

## 2023-10-11 PROCEDURE — 1160F RVW MEDS BY RX/DR IN RCRD: CPT | Mod: CPTII,S$GLB,,

## 2023-10-11 PROCEDURE — 99214 OFFICE O/P EST MOD 30 MIN: CPT | Mod: S$GLB,,,

## 2023-10-11 PROCEDURE — 1159F PR MEDICATION LIST DOCUMENTED IN MEDICAL RECORD: ICD-10-PCS | Mod: CPTII,S$GLB,,

## 2023-10-11 PROCEDURE — 3044F PR MOST RECENT HEMOGLOBIN A1C LEVEL <7.0%: ICD-10-PCS | Mod: CPTII,S$GLB,,

## 2023-10-11 PROCEDURE — 3080F PR MOST RECENT DIASTOLIC BLOOD PRESSURE >= 90 MM HG: ICD-10-PCS | Mod: CPTII,S$GLB,,

## 2023-10-11 PROCEDURE — 1159F MED LIST DOCD IN RCRD: CPT | Mod: CPTII,S$GLB,,

## 2023-10-11 PROCEDURE — 1125F AMNT PAIN NOTED PAIN PRSNT: CPT | Mod: CPTII,S$GLB,,

## 2023-10-11 PROCEDURE — 3288F PR FALLS RISK ASSESSMENT DOCUMENTED: ICD-10-PCS | Mod: CPTII,S$GLB,,

## 2023-10-11 PROCEDURE — 3077F PR MOST RECENT SYSTOLIC BLOOD PRESSURE >= 140 MM HG: ICD-10-PCS | Mod: CPTII,S$GLB,,

## 2023-10-11 PROCEDURE — 1157F ADVNC CARE PLAN IN RCRD: CPT | Mod: CPTII,S$GLB,,

## 2023-10-11 RX ADMIN — DENOSUMAB 60 MG: 60 INJECTION SUBCUTANEOUS at 01:10

## 2023-10-11 NOTE — PROGRESS NOTES
"         RHEUMATOLOGY OUTPATIENT CLINIC NOTE    10/11/2023    Subjective:       Patient ID: Cori Avalos is a 73 y.o. female.    Chief Complaint: Osteoporosis      HPI    Cori Avalos is a 73 y.o. pleasant female here for rheumatology follow up for osteoporosis and osteoarthritis.      Tolerating Prolia infusions well. One fall since last appointment without resulting fracture.     She does have occasional shoulder pain from prior surgery, torn biceps, torn rotator cuff right side worse than left.  Has pain while sleeping at nighttime.  She typically sleeps on her left side, back.Follows with external ortho and pain managemetn.     Uses CPAP at nighttime.  Dry mouth at night.    She has not established with Dermatology at for a 2nd opinion on lumps in her arms.    She denies any swelling of any joints, redness, increased warmth any joints.   No upcoming planned invasive dental work.  No other shortness of breath.  Rheumatologic review of systems negative otherwise.      Physical exam with tenderness to palpation right shoulder and decreased range of motion less than 90° with passive and active range of motion, tender to palpation left shoulder.  With Rollator.  Able to rise from chair without assistance.    Objective:   BP (!) 143/97 (BP Location: Left arm, Patient Position: Sitting, BP Method: Large (Automatic))   Pulse 71   Resp 16   Ht 5' 3" (1.6 m)   Wt 106.3 kg (234 lb 5.6 oz)   BMI 41.51 kg/m²          Recent Results (from the past 672 hour(s))   COMPREHENSIVE METABOLIC PANEL    Collection Time: 10/05/23 12:57 PM   Result Value Ref Range    Sodium 145 136 - 145 mmol/L    Potassium 3.9 3.5 - 5.1 mmol/L    Chloride 109 95 - 110 mmol/L    CO2 22 (L) 23 - 29 mmol/L    Glucose 123 (H) 70 - 110 mg/dL    BUN 23 8 - 23 mg/dL    Creatinine 1.0 0.5 - 1.4 mg/dL    Calcium 9.7 8.7 - 10.5 mg/dL    Total Protein 7.0 6.0 - 8.4 g/dL    Albumin 3.4 (L) 3.5 - 5.2 g/dL    Total Bilirubin 0.8 0.1 - 1.0 mg/dL    Alkaline " "Phosphatase 86 55 - 135 U/L    AST 23 10 - 40 U/L    ALT 18 10 - 44 U/L    eGFR 59 (A) >60 mL/min/1.73 m^2    Anion Gap 14 8 - 16 mmol/L        No results found for: "TBGOLDPLUS"   Lab Results   Component Value Date    HEPAIGM Negative 11/19/2013    HEPBIGM Negative 11/19/2013    HEPCAB Negative 04/11/2017      DEXA 04/01/2022:  L1-L3 -0.2, FN-2.2, TH-1.3, 28.0% increase in spine,-6.2% decline in hip 18.2% major FRAX, hip FRAX 4.2%    Assessment:       1. Osteoporosis, postmenopausal    2. Compression fx, thoracic spine, sequela    3. Rotator cuff tear arthropathy of right shoulder    4. Sarcoid    5. Factor V Leiden    6. Spondylosis of lumbar region without myelopathy or radiculopathy                Impression:     Osteoporosis: tx with prolia since at least 2013 w improved bmd; h/o vertebral fxs a R shoulder fx, taking daily vit D no ca 1000mg/day supplement   DEXA April 2022 increase in Spine (arthritis vs sx?), stable at hip.  Osteopenia scores.       Chronic back pain: fusion in 2018, see by pain mgt, stimulator in place, allergy to all narcotics. Now using medical marijuana.      Chronic r shoulder pain: s/p rtc damage and injury in 2020 (fx); injected by ortho q 3 mos.  Now using medicinal marijuana.     H/o sarcoid (skin and lung involvement): nodules not sarcoid by bx, no sign of activity today ; h/o hcq and mtx-questionable diagnosis.  Says she went to [place] for evaluation and was told she did not have sarcoid. Elevated ACE level 8/9/22 of 125.  Follows with Dr. Kim in Pulmonology.CT chest 8/22 without evidence of active sarcoid.  Referral was sent to Dermatology for 2nd opinion on skin lesions.       Factor V leiden def: on eliquis chronically .  Now on 5 mg b.i.d..  Follows with Dr. Melo Colin.      Med monitoring: no issues w prolia     Hypokalemia  Possibly secondary to diuretic. Now on potassium chloride supplement .  Most recent level normal.    Plan:            Osteoporosis  Medication  Prolia " today and continue q 6 months unless CI   Dexa  Repeat 4/2024  Counseling   Weight bearing activity as tolerated  Caution to avoid falls      Continue vitamin-D supplementation.  Weight-bearing activities as tolerated.  Caution to avoid falls.    Derm referral is in. She can schedule whenever she is ready.     Topical Voltaren 3-4 times daily as needed for shoulder and knee pain.  Use pillows at nighttime for cushioning for comfort.    Continue potassium per primary care     Continue with ortho and pain management for shoulder, knee pain    Follow up 6 months for Prolia with CMP 1 week prior in [place].     Brandy Bernal PA-C  Rheumatology Department   [facility]        30 minutes of total time spent on the encounter, which includes face to face time and non-face to face time preparing to see the patient (eg, review of tests), Obtaining and/or reviewing separately obtained history, Documenting clinical information in the electronic or other health record, Independently interpreting results (not separately reported) and communicating results to the patient/family/caregiver, or Care coordination (not separately reported).    Disclaimer: This note was prepared using voice recognition system and is likely to have sound alike errors and is not proof read.  Please call me with any questions

## 2023-10-11 NOTE — DISCHARGE INSTRUCTIONS
.[facility]  [address] [address]  [address] [address]  [phone] phone     [phone] fax  Hours of Operation: Monday- Friday 8:00am- 5:00pm  After hours phone  [phone]  Hematology / Oncology Physicians on call    Dr. Deion Mosley      Nurse Practitioners:    Madelin Godinez, JAX Gates, JAX Love, JAX Carvalho, JAX Grider, PA      Please don't hesitate to call if you have any concerns.       .Remember to take your calcium with vitamin D supplement as directed.   It is not advisable to undergo invasive dental procedure, within 3 months of your injection, unless approved by your treating provider.

## 2023-10-26 ENCOUNTER — OFFICE VISIT (OUTPATIENT)
Dept: FAMILY MEDICINE | Facility: CLINIC | Age: 73
End: 2023-10-26
Payer: MEDICARE

## 2023-10-26 VITALS
WEIGHT: 236.44 LBS | HEART RATE: 64 BPM | BODY MASS INDEX: 41.89 KG/M2 | HEIGHT: 63 IN | SYSTOLIC BLOOD PRESSURE: 100 MMHG | DIASTOLIC BLOOD PRESSURE: 60 MMHG

## 2023-10-26 DIAGNOSIS — I10 ESSENTIAL (PRIMARY) HYPERTENSION: ICD-10-CM

## 2023-10-26 DIAGNOSIS — G31.84 MCI (MILD COGNITIVE IMPAIRMENT) WITH MEMORY LOSS: ICD-10-CM

## 2023-10-26 DIAGNOSIS — E66.01 MORBID OBESITY WITH BMI OF 40.0-44.9, ADULT: ICD-10-CM

## 2023-10-26 DIAGNOSIS — G25.81 RLS (RESTLESS LEGS SYNDROME): ICD-10-CM

## 2023-10-26 DIAGNOSIS — F33.41 RECURRENT MAJOR DEPRESSIVE DISORDER, IN PARTIAL REMISSION: ICD-10-CM

## 2023-10-26 DIAGNOSIS — Z23 NEED FOR SHINGLES VACCINE: ICD-10-CM

## 2023-10-26 DIAGNOSIS — Z00.00 MEDICARE ANNUAL WELLNESS VISIT, SUBSEQUENT: Primary | ICD-10-CM

## 2023-10-26 DIAGNOSIS — I48.0 PAROXYSMAL ATRIAL FIBRILLATION: ICD-10-CM

## 2023-10-26 PROCEDURE — 1160F PR REVIEW ALL MEDS BY PRESCRIBER/CLIN PHARMACIST DOCUMENTED: ICD-10-PCS | Mod: CPTII,S$GLB,, | Performed by: INTERNAL MEDICINE

## 2023-10-26 PROCEDURE — G0439 PR MEDICARE ANNUAL WELLNESS SUBSEQUENT VISIT: ICD-10-PCS | Mod: S$GLB,,, | Performed by: INTERNAL MEDICINE

## 2023-10-26 PROCEDURE — 1157F ADVNC CARE PLAN IN RCRD: CPT | Mod: CPTII,S$GLB,, | Performed by: INTERNAL MEDICINE

## 2023-10-26 PROCEDURE — 1160F RVW MEDS BY RX/DR IN RCRD: CPT | Mod: CPTII,S$GLB,, | Performed by: INTERNAL MEDICINE

## 2023-10-26 PROCEDURE — G0439 PPPS, SUBSEQ VISIT: HCPCS | Mod: S$GLB,,, | Performed by: INTERNAL MEDICINE

## 2023-10-26 PROCEDURE — 99999 PR PBB SHADOW E&M-EST. PATIENT-LVL III: ICD-10-PCS | Mod: PBBFAC,,, | Performed by: INTERNAL MEDICINE

## 2023-10-26 PROCEDURE — 99999 PR PBB SHADOW E&M-EST. PATIENT-LVL III: CPT | Mod: PBBFAC,,, | Performed by: INTERNAL MEDICINE

## 2023-10-26 PROCEDURE — 3288F PR FALLS RISK ASSESSMENT DOCUMENTED: ICD-10-PCS | Mod: CPTII,S$GLB,, | Performed by: INTERNAL MEDICINE

## 2023-10-26 PROCEDURE — 3078F PR MOST RECENT DIASTOLIC BLOOD PRESSURE < 80 MM HG: ICD-10-PCS | Mod: CPTII,S$GLB,, | Performed by: INTERNAL MEDICINE

## 2023-10-26 PROCEDURE — 3288F FALL RISK ASSESSMENT DOCD: CPT | Mod: CPTII,S$GLB,, | Performed by: INTERNAL MEDICINE

## 2023-10-26 PROCEDURE — 1101F PT FALLS ASSESS-DOCD LE1/YR: CPT | Mod: CPTII,S$GLB,, | Performed by: INTERNAL MEDICINE

## 2023-10-26 PROCEDURE — 1157F PR ADVANCE CARE PLAN OR EQUIV PRESENT IN MEDICAL RECORD: ICD-10-PCS | Mod: CPTII,S$GLB,, | Performed by: INTERNAL MEDICINE

## 2023-10-26 PROCEDURE — 99213 PR OFFICE/OUTPT VISIT, EST, LEVL III, 20-29 MIN: ICD-10-PCS | Mod: 25,S$GLB,, | Performed by: INTERNAL MEDICINE

## 2023-10-26 PROCEDURE — 3044F PR MOST RECENT HEMOGLOBIN A1C LEVEL <7.0%: ICD-10-PCS | Mod: CPTII,S$GLB,, | Performed by: INTERNAL MEDICINE

## 2023-10-26 PROCEDURE — 3044F HG A1C LEVEL LT 7.0%: CPT | Mod: CPTII,S$GLB,, | Performed by: INTERNAL MEDICINE

## 2023-10-26 PROCEDURE — 1159F MED LIST DOCD IN RCRD: CPT | Mod: CPTII,S$GLB,, | Performed by: INTERNAL MEDICINE

## 2023-10-26 PROCEDURE — 99213 OFFICE O/P EST LOW 20 MIN: CPT | Mod: 25,S$GLB,, | Performed by: INTERNAL MEDICINE

## 2023-10-26 PROCEDURE — 1125F PR PAIN SEVERITY QUANTIFIED, PAIN PRESENT: ICD-10-PCS | Mod: CPTII,S$GLB,, | Performed by: INTERNAL MEDICINE

## 2023-10-26 PROCEDURE — 1159F PR MEDICATION LIST DOCUMENTED IN MEDICAL RECORD: ICD-10-PCS | Mod: CPTII,S$GLB,, | Performed by: INTERNAL MEDICINE

## 2023-10-26 PROCEDURE — 1101F PR PT FALLS ASSESS DOC 0-1 FALLS W/OUT INJ PAST YR: ICD-10-PCS | Mod: CPTII,S$GLB,, | Performed by: INTERNAL MEDICINE

## 2023-10-26 PROCEDURE — 3078F DIAST BP <80 MM HG: CPT | Mod: CPTII,S$GLB,, | Performed by: INTERNAL MEDICINE

## 2023-10-26 PROCEDURE — 3074F PR MOST RECENT SYSTOLIC BLOOD PRESSURE < 130 MM HG: ICD-10-PCS | Mod: CPTII,S$GLB,, | Performed by: INTERNAL MEDICINE

## 2023-10-26 PROCEDURE — 1125F AMNT PAIN NOTED PAIN PRSNT: CPT | Mod: CPTII,S$GLB,, | Performed by: INTERNAL MEDICINE

## 2023-10-26 PROCEDURE — 3074F SYST BP LT 130 MM HG: CPT | Mod: CPTII,S$GLB,, | Performed by: INTERNAL MEDICINE

## 2023-10-26 RX ORDER — TRAMADOL HYDROCHLORIDE 50 MG/1
50 TABLET ORAL EVERY 6 HOURS PRN
COMMUNITY

## 2023-10-26 NOTE — PATIENT INSTRUCTIONS
Counseling and Referral of Other Preventative  (Italic type indicates deductible and co-insurance are waived)    No orders of the defined types were placed in this encounter.     The following information is provided to all patients.  This information is to help you find resources for any of the problems found today that may be affecting your health:                Living healthy guide: www.[email or web address].[email or web address].[facility]       Understanding Diabetes: www.diabetes.org       Eating healthy: www.cdc.gov/healthyweight      CDC home safety checklist: www.cdc.gov/steadi/patient.html      Agency on Aging: www.goea.[place].[facility]       Alcoholics anonymous (AA): www.aa.org      Physical Activity: www.ehsan.nih.gov/yj7jdzf       Tobacco use: www.quitwithusla.org

## 2023-10-26 NOTE — PROGRESS NOTES
The following components were reviewed and updated:   Medical history, Family History, Social history,Allergies and Current Medications, Health Risk Assessment, Health Maintenance, Living Situation, Depression Screening. .    HRA: patient feels overall is healthy.  Psychosocial and behavioral risks discussed.  BMI - 41  Weight loss discussed.   Diet - well balanced.   ADL: self sufficient in all  Instrumental ADL: patient is able to manage things like their medications and finances.    Memory or cognitive function - Patient has no issues with either   Ambulates normal. No recent falls.  Exercise - no walks w walker   Depression screening is negative.  Hearing--no deficits.  Vision - glasses no deficits.   Incontinence - OAB wears depends   Opiate Screen- Negative     Preventative health needs discussed and patient was given a printed list of what they have received and what they will need with in the next 5-10 years. Recommendations developed using the USPSTF age appropriate recommendations. Education, counseling, and referrals were provided as needed.   Screening schedule reviewed with patient     Advanced Care directive: yes in chart,  I recommend living will & POA   (Ie: pick a person who would make decisions for you if you were unable to make them for yourself, called a health care power of , and what kind of decisions you might make such as use of life sustaining treatments such as ventilators, tube feeding when faced with a life limiting illness recorded on a living will.     I have reviewed and updated the patient's current list of providers.     In addition to the patient's preventative review and discussion today, the patient also has other issues to discuss today with a separate summary of plan below:     Subjective:       Patient ID: Cori Avalos is a 73 y.o. female.    Chief Complaint: Medicare AWV   HPI    Gyn: Dr Hunt   MM/23  Dexa: 2019 Osteopenia -1.5 & -1.9  w fragility fracture.   4/22 Tx:  Prolia mgmt in [place] at rheum MD  Colonoscopy:   2/25/19 adenoma r/c 3 yr Dr Jeff // 7/2023   Immunizations: Flu: Y Tdap: [facility] 2021 cat bite Pneumovax: 2014 Prevnar 13: 2009 Shingles: old  Covid: yes   Smoker:  Never   Eye: [place]       Wellness     Plan is poss right shoulder rhizotomy. But has to due 2 trial before procedure.   Right knee OA - bad. Poss replacement in future.       MCI w memory loss: short term. Never did neuropsych eval, recommended by neuro. Will reorder   Has seen Neuro clinic past. .  Did see Neuro clinic.  Daughter reports will blurts out conversations or words.   Off Valium, Elavil.  ? Depression, meds, Cpap isseus.        OAB incontinence bladder & bowel. hx frequent UTI.  + depends use      Depression: uncontrolled frustrated w medical issues, aging, memory.  Rx Cymbalta 90    insomnia: SE Seroquel nightmares      HTN: controlled Rx metoprolol 75 bid      A fib: + loop recorder// Rx; Eliquis 5 & Metoprolol    Mgmt Cardio: Dr Colin      CKD stage 3: stable  GFR 50s     Anemia: iron def:  Hb 13. Rx  Recommend 2-3 x wkly iron supplement.    Lowest iron Sat 14.     RLS:  Controlled Rx Requip 5   Right leg vein insuffiencey  w cardio Dr Colin.  Q 3-4 M      Back DDD; s/p injections// Dr Flanagan + pain stimulator. /prev  Dr Bocanegra - now Dr Carbajal    off chronic Valium               Chronic arthralgias:  Shoulders are issue bicep tears, causing diff ADLs.   Rx medical [facility] pain mgmt Dr Bocanegra   helping w pain- shoulder and knee pain     Cutaneous sarcoid: ? hard nodules under skin- some questions if truly dx.  Told at outside  was not sarcoid.     Allergies:  Controlled Rx astelin, Flunisolide nasal, H1      [place]: stable Rx  auto Cpap w o2.  //mgmt  Dr ARLEEN Soto    Hx of hospital admit w unresponsiveness, elevated CO2 due to [place] & sedatives.          GERD: controlled Rx Nexium 40       ____________________________________________________________________________________________________  Assessment & Plan:  1. Medicare annual wellness visit, subsequent    2. MCI (mild cognitive impairment) with memory loss  - Ambulatory referral/consult to Adult Neuropsychology; Future    3. Need for shingles vaccine  - varicella-zoster [id number], PF, (SHINGRIX) 50 mcg/0.5 mL injection; Inject 0.5 mLs into the muscle once. for 1 dose  Dispense: 1 each; Refill: 1    4. Recurrent major depressive disorder, in partial remission    5. Paroxysmal atrial fibrillation    6. Essential (primary) hypertension    7. Morbid obesity with BMI of 40.0-44.9, adult    8. RLS (restless legs syndrome)     Medicare annual wellness visit, subsequent    MCI (mild cognitive impairment) with memory loss  -     Ambulatory referral/consult to Adult Neuropsychology; Future; Expected date: 11/02/2023    Need for shingles vaccine  -     varicella-zoster [id number], PF, (SHINGRIX) 50 mcg/0.5 mL injection; Inject 0.5 mLs into the muscle once. for 1 dose  Dispense: 1 each; Refill: 1    Recurrent major depressive disorder, in partial remission    Paroxysmal atrial fibrillation    Essential (primary) hypertension    Morbid obesity with BMI of 40.0-44.9, adult    RLS (restless legs syndrome)    Other orders  -     pneumoc 20-osman conj-dip cr,PF, (PREVNAR-20, PF,) 0.5 mL Syrg injection; Inject 0.5 mLs into the muscle once. for 1 dose  Dispense: 0.5 mL; Refill: 0        Continue to work on regular exercise, maintain healthy weight, balanced diet. Avoid unhealthy habits: smoking, excessive alcohol intake.     Disclaimer: This note was partly generated using dictation software which may occasionally result in transcription errors  ____________________________________________________________________________________________________  Review of Systems:  Review of Systems   Constitutional:  Negative for appetite change.   HENT:  Negative for nosebleeds.     Eyes:  Negative for pain.   Respiratory:  Negative for choking.    Cardiovascular:  Negative for chest pain.   Gastrointestinal:  Negative for blood in stool.   Genitourinary:  Negative for hematuria.   Musculoskeletal:  Negative for joint swelling.   Skin:  Negative for pallor.   Neurological:  Negative for facial asymmetry.   Hematological:  Does not bruise/bleed easily.   Psychiatric/Behavioral:  Negative for confusion.        Objective:     Wt Readings from Last 3 Encounters:   10/26/23 107.2 kg (236 lb 7.1 oz)   10/11/23 106.3 kg (234 lb 5.6 oz)   07/18/23 109.3 kg (240 lb 15.4 oz)     BP Readings from Last 3 Encounters:   10/26/23 100/60   10/11/23 134/72   10/11/23 (!) 143/97       Lab Results   Component Value Date    WBC 6.75 05/16/2023    HGB 13.2 05/16/2023    HCT 38.1 05/16/2023     05/16/2023     10/05/2023    K 3.9 10/05/2023     10/05/2023    ALT 18 10/05/2023    AST 23 10/05/2023    CO2 22 (L) 10/05/2023    CREATININE 1.0 10/05/2023    BUN 23 10/05/2023     (H) 10/05/2023      Hemoglobin A1C   Date Value Ref Range Status   05/16/2023 4.9 0.0 - 5.6 % Final     Comment:     Reference Interval:  5.0 - 5.6 Normal   5.7 - 6.4 High Risk   > 6.5 Diabetic      Hgb A1c results are standardized based on the (NGSP) National   Glycohemoglobin Standardization Program.      Hemoglobin A1C levels are related to mean serum/plasma glucose   during the preceding 2-3 months.        04/06/2021 5.2 0.0 - 5.6 % Final     Comment:     Reference Interval:  5.0 - 5.6 Normal   5.7 - 6.4 High Risk   > 6.5 Diabetic      Hgb A1c results are standardized based on the (NGSP) National   Glycohemoglobin Standardization Program.      Hemoglobin A1C levels are related to mean serum/plasma glucose   during the preceding 2-3 months.        08/15/2017 5.2 4.0 - 5.6 % Final     Comment:     According to ADA guidelines, hemoglobin A1c <7.0% represents  optimal control in non-pregnant diabetic patients.  Different  metrics may apply to specific patient populations.   Standards of Medical Care in Diabetes-2016.  For the purpose of screening for the presence of diabetes:  <5.7%     Consistent with the absence of diabetes  5.7-6.4%  Consistent with increasing risk for diabetes   (prediabetes)  >or=6.5%  Consistent with diabetes  Currently, no consensus exists for use of hemoglobin A1c  for diagnosis of diabetes for children.  This Hemoglobin A1c assay has significant interference with fetal   hemoglobin   (HbF). The results are invalid for patients with abnormal amounts of   HbF,   including those with known Hereditary Persistence   of Fetal Hemoglobin. Heterozygous hemoglobin variants (HbAS, HbAC,   HbAD, HbAE, HbA2) do not significantly interfere with this assay;   however, presence of multiple variants in a sample may impact the %   interference.        Lab Results   Component Value Date    TSH 3.761 08/25/2022    TSH 1.512 08/05/2021    TSH 0.707 04/06/2021     Lab Results   Component Value Date    FREET4 1.36 05/07/2020    FREET4 0.90 01/14/2013    FREET4 1.09 06/19/2012     Lab Results   Component Value Date    LDLCALC 74.8 06/08/2023    LDLCALC 61 02/12/2022    LDLCALC 59.8 (L) 04/06/2021     Lab Results   Component Value Date    TRIG 91 06/08/2023    TRIG 131 02/12/2022    TRIG 61 04/06/2021            Physical Exam  Constitutional:       Appearance: Normal appearance.   HENT:      Head: Normocephalic and atraumatic.   Eyes:      Extraocular Movements: Extraocular movements intact.      Pupils: Pupils are equal, round, and reactive to light.   Cardiovascular:      Rate and Rhythm: Normal rate and regular rhythm.   Pulmonary:      Effort: Pulmonary effort is normal.      Breath sounds: Normal breath sounds.   Musculoskeletal:      Right lower leg: No edema.      Left lower leg: No edema.   Neurological:      Mental Status: She is alert.         Medication List with Changes/Refills   Current Medications     ALBUTEROL (PROVENTIL/VENTOLIN HFA) 90 MCG/ACTUATION INHALER    Inhale 2 puffs into the lungs every 4 (four) hours as needed for Wheezing or Shortness of Breath.    APIXABAN (ELIQUIS) 5 MG TAB    Take 5 mg by mouth 2 (two) times daily.    BUDESONIDE-FORMOTEROL 80-4.5 MCG (SYMBICORT) 80-4.5 MCG/ACTUATION HFAA    Inhale 2 puffs into the lungs 2 (two) times a day. Controller    CALCIUM 500 500 MG CALCIUM (1,250 MG) TABLET    Take 1 tablet by mouth once daily.    CANNABIDIOL (EPIDIOLEX) 100 MG/ML    5 mg/kg nightly. 0.5 tinture    CHOLECALCIFEROL, VITAMIN D3, (VITAMIN D3) 25 MCG (1,000 UNIT) CAPSULE    Take 1,000 Units by mouth once daily.    CHOLESTYRAMINE (QUESTRAN) 4 GRAM PACKET    Take 4 g by mouth once daily.    DENOSUMAB (PROLIA) 60 MG/ML SYRG    Inject 60 mg into the skin every 6 (six) months.     DIPHENOXYLATE-ATROPINE 2.5-0.025 MG (LOMOTIL) 2.5-0.025 MG PER TABLET    TAKE ONE TABLET BY MOUTH FOUR TIMES DAILY AS NEEDED FOR DIARRHEA    DULOXETINE (CYMBALTA) 30 MG CAPSULE    Take 1 capsule (30 mg total) by mouth once daily.    DULOXETINE (CYMBALTA) 60 MG CAPSULE    TAKE 1 CAPSULE BY MOUTH ONCE DAILY.    FLUNISOLIDE 25 MCG, 0.025%, (NASALIDE) 25 MCG (0.025 %) SPRY    instill one SPRAY into each nostril EVERY DAY    FUROSEMIDE (LASIX) 20 MG TABLET    Take 40 mg by mouth every other day.    GUAIFENESIN (MUCINEX) 600 MG 12 HR TABLET    Take 1,200 mg by mouth 2 (two) times daily as needed.     IBUPROFEN (ADVIL,MOTRIN) 800 MG TABLET    Take 800 mg by mouth every 6 (six) hours as needed.     LEVOCETIRIZINE (XYZAL) 5 MG TABLET    Take 5 mg by mouth every evening.    METOPROLOL TARTRATE (LOPRESSOR) 50 MG TABLET    Take 75 mg by mouth 2 (two) times daily. Pt taking 1 & a half tablets twice a day.    NYSTATIN-TRIAMCINOLONE (MYCOLOG II) CREAM    Apply 1 g topically as needed.    ONDANSETRON (ZOFRAN-ODT) 4 MG TBDL    Take 1 tablet (4 mg total) by mouth every 8 (eight) hours as needed.    PANTOPRAZOLE (PROTONIX) 20 MG TABLET     Take 40 mg by mouth once daily.    POTASSIUM CHLORIDE (KLOR-CON) 10 MEQ TBSR    Take 1 tablet (10 mEq total) by mouth every other day. Take w Lasix dose    PRAMIPEXOLE (MIRAPEX) 0.5 MG TABLET    Take 1.5 mg by mouth 2 (two) times a day.    TRAMADOL (ULTRAM) 50 MG TABLET    Take 50 mg by mouth every 6 (six) hours as needed for Pain. Prn Dr Calvin

## 2023-11-13 ENCOUNTER — OFFICE VISIT (OUTPATIENT)
Dept: FAMILY MEDICINE | Facility: CLINIC | Age: 73
End: 2023-11-13
Payer: MEDICARE

## 2023-11-13 VITALS
SYSTOLIC BLOOD PRESSURE: 124 MMHG | BODY MASS INDEX: 41.54 KG/M2 | HEIGHT: 63 IN | WEIGHT: 234.44 LBS | DIASTOLIC BLOOD PRESSURE: 84 MMHG

## 2023-11-13 DIAGNOSIS — J01.90 ACUTE BACTERIAL SINUSITIS: Primary | ICD-10-CM

## 2023-11-13 DIAGNOSIS — R05.3 CHRONIC COUGH: ICD-10-CM

## 2023-11-13 DIAGNOSIS — R05.9 COUGH, UNSPECIFIED TYPE: ICD-10-CM

## 2023-11-13 DIAGNOSIS — B96.89 ACUTE BACTERIAL SINUSITIS: Primary | ICD-10-CM

## 2023-11-13 DIAGNOSIS — J45.20 MILD INTERMITTENT REACTIVE AIRWAY DISEASE WITHOUT COMPLICATION: ICD-10-CM

## 2023-11-13 DIAGNOSIS — R50.9 FEVER, UNSPECIFIED FEVER CAUSE: ICD-10-CM

## 2023-11-13 DIAGNOSIS — R09.81 NASAL CONGESTION: ICD-10-CM

## 2023-11-13 PROCEDURE — 3074F PR MOST RECENT SYSTOLIC BLOOD PRESSURE < 130 MM HG: ICD-10-PCS | Mod: CPTII,S$GLB,, | Performed by: INTERNAL MEDICINE

## 2023-11-13 PROCEDURE — 99999 PR PBB SHADOW E&M-EST. PATIENT-LVL III: ICD-10-PCS | Mod: PBBFAC,,, | Performed by: INTERNAL MEDICINE

## 2023-11-13 PROCEDURE — 1160F PR REVIEW ALL MEDS BY PRESCRIBER/CLIN PHARMACIST DOCUMENTED: ICD-10-PCS | Mod: CPTII,S$GLB,, | Performed by: INTERNAL MEDICINE

## 2023-11-13 PROCEDURE — 3079F DIAST BP 80-89 MM HG: CPT | Mod: CPTII,S$GLB,, | Performed by: INTERNAL MEDICINE

## 2023-11-13 PROCEDURE — 99999 PR PBB SHADOW E&M-EST. PATIENT-LVL III: CPT | Mod: PBBFAC,,, | Performed by: INTERNAL MEDICINE

## 2023-11-13 PROCEDURE — 3288F FALL RISK ASSESSMENT DOCD: CPT | Mod: CPTII,S$GLB,, | Performed by: INTERNAL MEDICINE

## 2023-11-13 PROCEDURE — 1101F PR PT FALLS ASSESS DOC 0-1 FALLS W/OUT INJ PAST YR: ICD-10-PCS | Mod: CPTII,S$GLB,, | Performed by: INTERNAL MEDICINE

## 2023-11-13 PROCEDURE — 1125F AMNT PAIN NOTED PAIN PRSNT: CPT | Mod: CPTII,S$GLB,, | Performed by: INTERNAL MEDICINE

## 2023-11-13 PROCEDURE — 3044F PR MOST RECENT HEMOGLOBIN A1C LEVEL <7.0%: ICD-10-PCS | Mod: CPTII,S$GLB,, | Performed by: INTERNAL MEDICINE

## 2023-11-13 PROCEDURE — 1157F ADVNC CARE PLAN IN RCRD: CPT | Mod: CPTII,S$GLB,, | Performed by: INTERNAL MEDICINE

## 2023-11-13 PROCEDURE — 1159F MED LIST DOCD IN RCRD: CPT | Mod: CPTII,S$GLB,, | Performed by: INTERNAL MEDICINE

## 2023-11-13 PROCEDURE — 1101F PT FALLS ASSESS-DOCD LE1/YR: CPT | Mod: CPTII,S$GLB,, | Performed by: INTERNAL MEDICINE

## 2023-11-13 PROCEDURE — 1157F PR ADVANCE CARE PLAN OR EQUIV PRESENT IN MEDICAL RECORD: ICD-10-PCS | Mod: CPTII,S$GLB,, | Performed by: INTERNAL MEDICINE

## 2023-11-13 PROCEDURE — 3288F PR FALLS RISK ASSESSMENT DOCUMENTED: ICD-10-PCS | Mod: CPTII,S$GLB,, | Performed by: INTERNAL MEDICINE

## 2023-11-13 PROCEDURE — 3044F HG A1C LEVEL LT 7.0%: CPT | Mod: CPTII,S$GLB,, | Performed by: INTERNAL MEDICINE

## 2023-11-13 PROCEDURE — 99214 OFFICE O/P EST MOD 30 MIN: CPT | Mod: S$GLB,,, | Performed by: INTERNAL MEDICINE

## 2023-11-13 PROCEDURE — 1160F RVW MEDS BY RX/DR IN RCRD: CPT | Mod: CPTII,S$GLB,, | Performed by: INTERNAL MEDICINE

## 2023-11-13 PROCEDURE — 3079F PR MOST RECENT DIASTOLIC BLOOD PRESSURE 80-89 MM HG: ICD-10-PCS | Mod: CPTII,S$GLB,, | Performed by: INTERNAL MEDICINE

## 2023-11-13 PROCEDURE — 99214 PR OFFICE/OUTPT VISIT, EST, LEVL IV, 30-39 MIN: ICD-10-PCS | Mod: S$GLB,,, | Performed by: INTERNAL MEDICINE

## 2023-11-13 PROCEDURE — 3074F SYST BP LT 130 MM HG: CPT | Mod: CPTII,S$GLB,, | Performed by: INTERNAL MEDICINE

## 2023-11-13 PROCEDURE — 1159F PR MEDICATION LIST DOCUMENTED IN MEDICAL RECORD: ICD-10-PCS | Mod: CPTII,S$GLB,, | Performed by: INTERNAL MEDICINE

## 2023-11-13 PROCEDURE — 1125F PR PAIN SEVERITY QUANTIFIED, PAIN PRESENT: ICD-10-PCS | Mod: CPTII,S$GLB,, | Performed by: INTERNAL MEDICINE

## 2023-11-13 RX ORDER — ALBUTEROL SULFATE 90 UG/1
2 AEROSOL, METERED RESPIRATORY (INHALATION) EVERY 4 HOURS PRN
Qty: 18 G | Refills: 0 | Status: SHIPPED | OUTPATIENT
Start: 2023-11-13

## 2023-11-13 RX ORDER — TIZANIDINE 2 MG/1
4 TABLET ORAL NIGHTLY
COMMUNITY

## 2023-11-13 RX ORDER — DOXYCYCLINE HYCLATE 100 MG
100 TABLET ORAL 2 TIMES DAILY
Qty: 14 TABLET | Refills: 0 | Status: SHIPPED | OUTPATIENT
Start: 2023-11-13

## 2023-11-13 RX ORDER — FLUNISOLIDE 0.25 MG/ML
SOLUTION NASAL
Qty: 25 ML | Refills: 5 | Status: SHIPPED | OUTPATIENT
Start: 2023-11-13

## 2023-11-13 RX ORDER — PREDNISONE 10 MG/1
10 TABLET ORAL EVERY MORNING
Qty: 5 TABLET | Refills: 0 | Status: SHIPPED | OUTPATIENT
Start: 2023-11-13

## 2023-11-13 NOTE — PROGRESS NOTES
Subjective:       Patient ID: Cori Avalos is a 73 y.o. female.    Chief Complaint: Fever and Cough   Started 2 days fever and highest is 100  Saturday started feeling bad     Taking regular meds     Cpap night.     Cough  This is a new problem. The current episode started in the past 7 days. The problem has been rapidly improving. The problem occurs every few minutes. The cough is Non-productive. Associated symptoms include a fever, nasal congestion, postnasal drip and wheezing. Pertinent negatives include no chest pain or sore throat. The symptoms are aggravated by cold air and pollens. She has tried steroid inhaler and a beta-agonist inhaler for the symptoms. The treatment provided mild relief. Her past medical history is significant for bronchitis and environmental allergies.     Allergies:  Controlled Rx astelin, Flunisolide nasal, H1      [place]: stable Rx  auto Cpap w o2.  //mgmt  Dr ARLEEN Soto                of hospital admit w unresponsiveness, elevated CO2 due to [place] & sedatives.            ____________________________________________________________________________________________________  Assessment & Plan:  1. Acute bacterial sinusitis  - doxycycline (VIBRA-TABS) 100 MG tablet; Take 1 tablet (100 mg total) by mouth 2 (two) times daily.  Dispense: 14 tablet; Refill: 0  - predniSONE (DELTASONE) 10 MG tablet; Take 1 tablet (10 mg total) by mouth every morning.  Dispense: 5 tablet; Refill: 0    2. Cough, unspecified type    3. Fever, unspecified fever cause    4. Nasal congestion  - flunisolide 25 mcg, 0.025%, (NASALIDE) 25 mcg (0.025 %) Spry; instill one SPRAY into each nostril EVERY DAY  Dispense: 25 mL; Refill: 5    5. Chronic cough  - albuterol (PROVENTIL/VENTOLIN HFA) 90 mcg/actuation inhaler; Inhale 2 puffs into the lungs every 4 (four) hours as needed for Wheezing or Shortness of Breath.  Dispense: 18 g; Refill: 0    6. Mild intermittent reactive airway disease without complication     Acute bacterial  sinusitis  -     doxycycline (VIBRA-TABS) 100 MG tablet; Take 1 tablet (100 mg total) by mouth 2 (two) times daily.  Dispense: 14 tablet; Refill: 0  -     predniSONE (DELTASONE) 10 MG tablet; Take 1 tablet (10 mg total) by mouth every morning.  Dispense: 5 tablet; Refill: 0    Cough, unspecified type    Fever, unspecified fever cause    Nasal congestion  -     flunisolide 25 mcg, 0.025%, (NASALIDE) 25 mcg (0.025 %) Spry; instill one SPRAY into each nostril EVERY DAY  Dispense: 25 mL; Refill: 5    Chronic cough  -     albuterol (PROVENTIL/VENTOLIN HFA) 90 mcg/actuation inhaler; Inhale 2 puffs into the lungs every 4 (four) hours as needed for Wheezing or Shortness of Breath.  Dispense: 18 g; Refill: 0    Mild intermittent reactive airway disease without complication        Continue to work on regular exercise, maintain healthy weight, balanced diet. Avoid unhealthy habits: smoking, excessive alcohol intake.     Disclaimer: This note was partly generated using dictation software which may occasionally result in transcription errors  ____________________________________________________________________________________________________  Review of Systems:  Review of Systems   Constitutional:  Positive for fever. Negative for appetite change.   HENT:  Positive for postnasal drip. Negative for nosebleeds and sore throat.    Eyes:  Negative for pain.   Respiratory:  Positive for cough and wheezing. Negative for choking.    Cardiovascular:  Negative for chest pain.   Gastrointestinal:  Negative for blood in stool.   Genitourinary:  Negative for hematuria.   Musculoskeletal:  Negative for joint swelling.   Skin:  Negative for pallor.   Allergic/Immunologic: Positive for environmental allergies.   Neurological:  Negative for facial asymmetry.   Hematological:  Does not bruise/bleed easily.   Psychiatric/Behavioral:  Negative for confusion.        Objective:     Wt Readings from Last 3 Encounters:   11/13/23 106.4 kg (234 lb 7.4  oz)   10/26/23 107.2 kg (236 lb 7.1 oz)   10/11/23 106.3 kg (234 lb 5.6 oz)     BP Readings from Last 3 Encounters:   11/13/23 124/84   10/26/23 100/60   10/11/23 134/72       Lab Results   Component Value Date    WBC 6.75 05/16/2023    HGB 13.2 05/16/2023    HCT 38.1 05/16/2023     05/16/2023     10/05/2023    K 3.9 10/05/2023     10/05/2023    ALT 18 10/05/2023    AST 23 10/05/2023    CO2 22 (L) 10/05/2023    CREATININE 1.0 10/05/2023    BUN 23 10/05/2023     (H) 10/05/2023      Hemoglobin A1C   Date Value Ref Range Status   05/16/2023 4.9 0.0 - 5.6 % Final     Comment:     Reference Interval:  5.0 - 5.6 Normal   5.7 - 6.4 High Risk   > 6.5 Diabetic      Hgb A1c results are standardized based on the (NGSP) National   Glycohemoglobin Standardization Program.      Hemoglobin A1C levels are related to mean serum/plasma glucose   during the preceding 2-3 months.        04/06/2021 5.2 0.0 - 5.6 % Final     Comment:     Reference Interval:  5.0 - 5.6 Normal   5.7 - 6.4 High Risk   > 6.5 Diabetic      Hgb A1c results are standardized based on the (NGSP) National   Glycohemoglobin Standardization Program.      Hemoglobin A1C levels are related to mean serum/plasma glucose   during the preceding 2-3 months.        08/15/2017 5.2 4.0 - 5.6 % Final     Comment:     According to ADA guidelines, hemoglobin A1c <7.0% represents  optimal control in non-pregnant diabetic patients. Different  metrics may apply to specific patient populations.   Standards of Medical Care in Diabetes-2016.  For the purpose of screening for the presence of diabetes:  <5.7%     Consistent with the absence of diabetes  5.7-6.4%  Consistent with increasing risk for diabetes   (prediabetes)  >or=6.5%  Consistent with diabetes  Currently, no consensus exists for use of hemoglobin A1c  for diagnosis of diabetes for children.  This Hemoglobin A1c assay has significant interference with fetal   hemoglobin   (HbF). The results are  invalid for patients with abnormal amounts of   HbF,   including those with known Hereditary Persistence   of Fetal Hemoglobin. Heterozygous hemoglobin variants (HbAS, HbAC,   HbAD, HbAE, HbA2) do not significantly interfere with this assay;   however, presence of multiple variants in a sample may impact the %   interference.        Lab Results   Component Value Date    TSH 3.761 08/25/2022    TSH 1.512 08/05/2021    TSH 0.707 04/06/2021     Lab Results   Component Value Date    FREET4 1.36 05/07/2020    FREET4 0.90 01/14/2013    FREET4 1.09 06/19/2012     Lab Results   Component Value Date    LDLCALC 74.8 06/08/2023    LDLCALC 61 02/12/2022    LDLCALC 59.8 (L) 04/06/2021     Lab Results   Component Value Date    TRIG 91 06/08/2023    TRIG 131 02/12/2022    TRIG 61 04/06/2021            Physical Exam  Constitutional:       Appearance: Normal appearance.   HENT:      Head: Normocephalic and atraumatic.   Eyes:      Extraocular Movements: Extraocular movements intact.      Pupils: Pupils are equal, round, and reactive to light.   Cardiovascular:      Rate and Rhythm: Normal rate and regular rhythm.   Pulmonary:      Effort: Pulmonary effort is normal.      Breath sounds: Wheezing present.   Neurological:      Mental Status: She is alert.         Medication List with Changes/Refills   New Medications    DOXYCYCLINE (VIBRA-TABS) 100 MG TABLET    Take 1 tablet (100 mg total) by mouth 2 (two) times daily.    PREDNISONE (DELTASONE) 10 MG TABLET    Take 1 tablet (10 mg total) by mouth every morning.   Current Medications    APIXABAN (ELIQUIS) 5 MG TAB    Take 5 mg by mouth 2 (two) times daily.    BUDESONIDE-FORMOTEROL 80-4.5 MCG (SYMBICORT) 80-4.5 MCG/ACTUATION HFAA    Inhale 2 puffs into the lungs 2 (two) times a day. Controller    CALCIUM 500 500 MG CALCIUM (1,250 MG) TABLET    Take 1 tablet by mouth once daily.    CANNABIDIOL (EPIDIOLEX) 100 MG/ML    5 mg/kg nightly. 0.5 tinture    CHOLECALCIFEROL, VITAMIN D3, (VITAMIN  D3) 25 MCG (1,000 UNIT) CAPSULE    Take 1,000 Units by mouth once daily.    CHOLESTYRAMINE (QUESTRAN) 4 GRAM PACKET    Take 4 g by mouth once daily.    DENOSUMAB (PROLIA) 60 MG/ML SYRG    Inject 60 mg into the skin every 6 (six) months.     DIPHENOXYLATE-ATROPINE 2.5-0.025 MG (LOMOTIL) 2.5-0.025 MG PER TABLET    TAKE ONE TABLET BY MOUTH FOUR TIMES DAILY AS NEEDED FOR DIARRHEA    DULOXETINE (CYMBALTA) 30 MG CAPSULE    Take 1 capsule (30 mg total) by mouth once daily.    DULOXETINE (CYMBALTA) 60 MG CAPSULE    TAKE 1 CAPSULE BY MOUTH ONCE DAILY.    FUROSEMIDE (LASIX) 20 MG TABLET    Take 40 mg by mouth every other day.    GUAIFENESIN (MUCINEX) 600 MG 12 HR TABLET    Take 1,200 mg by mouth 2 (two) times daily as needed.     IBUPROFEN (ADVIL,MOTRIN) 800 MG TABLET    Take 800 mg by mouth every 6 (six) hours as needed.     LEVOCETIRIZINE (XYZAL) 5 MG TABLET    Take 5 mg by mouth every evening.    METOPROLOL TARTRATE (LOPRESSOR) 50 MG TABLET    Take 75 mg by mouth 2 (two) times daily. Pt taking 1 & a half tablets twice a day.    NYSTATIN-TRIAMCINOLONE (MYCOLOG II) CREAM    Apply 1 g topically as needed.    ONDANSETRON (ZOFRAN-ODT) 4 MG TBDL    Take 1 tablet (4 mg total) by mouth every 8 (eight) hours as needed.    PANTOPRAZOLE (PROTONIX) 20 MG TABLET    Take 40 mg by mouth once daily.    POTASSIUM CHLORIDE (KLOR-CON) 10 MEQ TBSR    Take 1 tablet (10 mEq total) by mouth every other day. Take w Lasix dose    PRAMIPEXOLE (MIRAPEX) 0.5 MG TABLET    Take 1.5 mg by mouth 2 (two) times a day.    TIZANIDINE (ZANAFLEX) 2 MG TABLET    Take 4 mg by mouth nightly.    TRAMADOL (ULTRAM) 50 MG TABLET    Take 50 mg by mouth every 6 (six) hours as needed for Pain. Prn Dr Calvin   Changed and/or Refilled Medications    Modified Medication Previous Medication    ALBUTEROL (PROVENTIL/VENTOLIN HFA) 90 MCG/ACTUATION INHALER albuterol (PROVENTIL/VENTOLIN HFA) 90 mcg/actuation inhaler       Inhale 2 puffs into the lungs every 4 (four) hours as  needed for Wheezing or Shortness of Breath.    Inhale 2 puffs into the lungs every 4 (four) hours as needed for Wheezing or Shortness of Breath.    FLUNISOLIDE 25 MCG, 0.025%, (NASALIDE) 25 MCG (0.025 %) SPRY flunisolide 25 mcg, 0.025%, (NASALIDE) 25 mcg (0.025 %) Spry       instill one SPRAY into each nostril EVERY DAY    instill one SPRAY into each nostril EVERY DAY

## 2024-01-04 ENCOUNTER — TELEPHONE (OUTPATIENT)
Dept: FAMILY MEDICINE | Facility: CLINIC | Age: 74
End: 2024-01-04
Payer: MEDICARE

## 2024-01-05 ENCOUNTER — TELEPHONE (OUTPATIENT)
Dept: FAMILY MEDICINE | Facility: CLINIC | Age: 74
End: 2024-01-05
Payer: MEDICARE

## 2024-01-05 NOTE — TELEPHONE ENCOUNTER
----- Message from Kelsey Bourne sent at 1/4/2024 11:49 AM CST -----  Contact: pt  Type:  Needs Medical Advice    Who Called: pt    Symptoms (please be specific): diagnosed with Flu type A but now has what feels like a sinus infection     How long has patient had these symptoms:  started Saturday, went to  and they Dx with flu    Pharmacy name and phone #:    [address] [address]  Phone: [phone] Fax: [phone]    Would the patient rather a call back or a response via [facility]? Call back    Best Call Back Number: [phone]    Additional Information: please call to advise  Thanks

## 2024-01-05 NOTE — TELEPHONE ENCOUNTER
Called patient and she was wanting to be seen. Let her know we do not have any available appointments and suggested her to go to urgent care.

## 2024-01-05 NOTE — TELEPHONE ENCOUNTER
----- Message from Melonie Graf sent at 1/5/2024 10:04 AM CST -----  Regarding: return call  Contact: patient  Type:  Patient Returning Call    Who Called:  patient  Who Left Message for Patient:  Elle  Does the patient know what this is regarding?:    Best Call Back Number:  [phone] (home)     Additional Information:  Please call patient to advise.  Thanks!

## 2024-01-09 RX ORDER — POTASSIUM CHLORIDE 750 MG/1
TABLET, EXTENDED RELEASE ORAL
Qty: 45 TABLET | Refills: 2 | Status: SHIPPED | OUTPATIENT
Start: 2024-01-09

## 2024-01-09 NOTE — TELEPHONE ENCOUNTER
No care due was identified.  [facility] Embedded Care Due Messages. Reference number: [id number].   1/09/2024 8:02:42 AM CST

## 2024-01-10 NOTE — TELEPHONE ENCOUNTER
Refill Decision Note   Cori Avalos  is requesting a refill authorization.  Brief Assessment and Rationale for Refill:  Approve     Medication Therapy Plan:       Medication Reconciliation Completed: No   Comments:     No Care Gaps recommended.     Note composed:6:44 PM 01/09/2024

## 2024-03-14 ENCOUNTER — TELEPHONE (OUTPATIENT)
Dept: FAMILY MEDICINE | Facility: CLINIC | Age: 74
End: 2024-03-14
Payer: MEDICARE

## 2024-03-14 RX ORDER — DULOXETIN HYDROCHLORIDE 30 MG/1
30 CAPSULE, DELAYED RELEASE ORAL
Qty: 90 CAPSULE | Refills: 2 | Status: SHIPPED | OUTPATIENT
Start: 2024-03-14

## 2024-03-14 NOTE — TELEPHONE ENCOUNTER
----- Message from Patel Olivo sent at 3/14/2024 12:03 PM CDT -----  Type:  Sooner Appointment Request    Caller is requesting a sooner appointment.  Caller declined first available appointment listed below.  Caller will not accept being placed on the waitlist and is requesting a message be sent to doctor.    Name of Caller:  pt  When is the first available appointment?  None--please call and advise  Symptoms:  knots in neck and shoulder--said it's VIP she get in soon  Would the patient rather a call back or a response via [facility]? call  Best Call Back Number:  [phone] (home)     Additional Information:  thank you

## 2024-03-14 NOTE — TELEPHONE ENCOUNTER
No care due was identified.  [facility] Embedded Care Due Messages. Reference number: [id number].   3/14/2024 8:05:16 AM CDT

## 2024-03-15 ENCOUNTER — TELEPHONE (OUTPATIENT)
Dept: FAMILY MEDICINE | Facility: CLINIC | Age: 74
End: 2024-03-15
Payer: MEDICARE

## 2024-03-15 NOTE — TELEPHONE ENCOUNTER
----- Message from Sailaja Guerrero sent at 3/14/2024  2:03 PM CDT -----  Contact: Patient  Type:  Patient Returning Call    Who Called:  Self  Who Left Message for Patient:   HANY VANEGAS  Does the patient know what this is regarding?:  her msg earlier  Best Call Back Number:  [phone]  Additional Information:  Please call the patient back at the phone number listed above to advise. Thank you!

## 2024-03-15 NOTE — TELEPHONE ENCOUNTER
Pt states she has swollen lymph nodes in neck, stated she only wants to see [facility] and will keep her appt on 4/4. Offered appts with other providers, but pt declined. Advised to keep apt with BSD and seek treatment at UC or ER if symptoms worsen

## 2024-03-19 NOTE — TELEPHONE ENCOUNTER
No care due was identified.  [facility] Embedded Care Due Messages. Reference number: [id number].   3/19/2024 2:11:32 PM CDT

## 2024-03-20 RX ORDER — PANTOPRAZOLE SODIUM 40 MG/1
TABLET, DELAYED RELEASE ORAL
Qty: 90 TABLET | Refills: 3 | Status: SHIPPED | OUTPATIENT
Start: 2024-03-20

## 2024-03-20 NOTE — TELEPHONE ENCOUNTER
Refill Routing Note   Medication(s) are not appropriate for processing by [facility] for the following reason(s):        No active prescription written by provider    [facility] action(s):  Defer               Appointments  past 12m or future 3m with PCP    Date Provider   Last Visit   11/13/2023 Arnoldo Miller MD   Next Visit   5/2/2024 Arnoldo Miller MD   ED visits in past 90 days: 1        Note composed:3:51 AM 03/20/2024

## 2024-04-04 ENCOUNTER — TELEPHONE (OUTPATIENT)
Dept: NEUROLOGY | Facility: CLINIC | Age: 74
End: 2024-04-04
Payer: MEDICARE

## 2024-04-08 ENCOUNTER — TELEPHONE (OUTPATIENT)
Dept: FAMILY MEDICINE | Facility: CLINIC | Age: 74
End: 2024-04-08
Payer: MEDICARE

## 2024-04-08 NOTE — TELEPHONE ENCOUNTER
----- Message from Mia Torres sent at 4/8/2024 11:42 AM CDT -----  Type:  Sooner Appointment Request    Caller is requesting a sooner appointment.  Caller declined first available appointment listed below.  Caller will not accept being placed on the waitlist and is requesting a message be sent to doctor.    Name of Caller:  pt  When is the first available appointment?  08/12  Symptoms:  6mo f/u  Would the patient rather a call back or a response via [facility]? Call back  Best Call Back Number:  [phone]    Additional Information:  pt states that first available does not work for them and needs to be seen sooner. Pt is needing to cancel the appt scheduled for 05/02 due to going out of town and needs to be seen after 05/05. Needs to speak to the office about getting rescheduled for that week. Please call back and advise. Thanks!

## 2024-05-08 ENCOUNTER — OFFICE VISIT (OUTPATIENT)
Dept: FAMILY MEDICINE | Facility: CLINIC | Age: 74
End: 2024-05-08
Payer: MEDICARE

## 2024-05-08 VITALS
HEART RATE: 68 BPM | RESPIRATION RATE: 18 BRPM | WEIGHT: 224.75 LBS | SYSTOLIC BLOOD PRESSURE: 100 MMHG | HEIGHT: 63 IN | BODY MASS INDEX: 39.82 KG/M2 | DIASTOLIC BLOOD PRESSURE: 68 MMHG

## 2024-05-08 DIAGNOSIS — N18.31 STAGE 3A CHRONIC KIDNEY DISEASE: ICD-10-CM

## 2024-05-08 DIAGNOSIS — F33.41 RECURRENT MAJOR DEPRESSIVE DISORDER, IN PARTIAL REMISSION: Primary | ICD-10-CM

## 2024-05-08 DIAGNOSIS — D86.3 CUTANEOUS SARCOIDOSIS: ICD-10-CM

## 2024-05-08 DIAGNOSIS — D68.51 FACTOR V LEIDEN: ICD-10-CM

## 2024-05-08 DIAGNOSIS — F33.1 MODERATE EPISODE OF RECURRENT MAJOR DEPRESSIVE DISORDER: ICD-10-CM

## 2024-05-08 DIAGNOSIS — D69.6 THROMBOCYTOPENIA: ICD-10-CM

## 2024-05-08 DIAGNOSIS — Z00.00 MEDICARE ANNUAL WELLNESS VISIT, SUBSEQUENT: ICD-10-CM

## 2024-05-08 DIAGNOSIS — G47.33 OSA (OBSTRUCTIVE SLEEP APNEA): ICD-10-CM

## 2024-05-08 DIAGNOSIS — D64.9 ANEMIA, UNSPECIFIED TYPE: ICD-10-CM

## 2024-05-08 DIAGNOSIS — D50.9 IRON DEFICIENCY ANEMIA, UNSPECIFIED IRON DEFICIENCY ANEMIA TYPE: ICD-10-CM

## 2024-05-08 DIAGNOSIS — I48.0 PAROXYSMAL ATRIAL FIBRILLATION: ICD-10-CM

## 2024-05-08 PROBLEM — E66.01 CLASS 3 SEVERE OBESITY DUE TO EXCESS CALORIES WITH SERIOUS COMORBIDITY IN ADULT, UNSPECIFIED BMI: Status: RESOLVED | Noted: 2023-05-25 | Resolved: 2024-05-08

## 2024-05-08 PROBLEM — E66.813 CLASS 3 SEVERE OBESITY DUE TO EXCESS CALORIES WITH SERIOUS COMORBIDITY IN ADULT, UNSPECIFIED BMI: Status: RESOLVED | Noted: 2023-05-25 | Resolved: 2024-05-08

## 2024-05-08 PROCEDURE — 3078F DIAST BP <80 MM HG: CPT | Mod: CPTII,S$GLB,, | Performed by: INTERNAL MEDICINE

## 2024-05-08 PROCEDURE — 1125F AMNT PAIN NOTED PAIN PRSNT: CPT | Mod: CPTII,S$GLB,, | Performed by: INTERNAL MEDICINE

## 2024-05-08 PROCEDURE — 1160F RVW MEDS BY RX/DR IN RCRD: CPT | Mod: CPTII,S$GLB,, | Performed by: INTERNAL MEDICINE

## 2024-05-08 PROCEDURE — 1101F PT FALLS ASSESS-DOCD LE1/YR: CPT | Mod: CPTII,S$GLB,, | Performed by: INTERNAL MEDICINE

## 2024-05-08 PROCEDURE — 1157F ADVNC CARE PLAN IN RCRD: CPT | Mod: CPTII,S$GLB,, | Performed by: INTERNAL MEDICINE

## 2024-05-08 PROCEDURE — 99214 OFFICE O/P EST MOD 30 MIN: CPT | Mod: S$GLB,,, | Performed by: INTERNAL MEDICINE

## 2024-05-08 PROCEDURE — 1159F MED LIST DOCD IN RCRD: CPT | Mod: CPTII,S$GLB,, | Performed by: INTERNAL MEDICINE

## 2024-05-08 PROCEDURE — 99999 PR PBB SHADOW E&M-EST. PATIENT-LVL V: CPT | Mod: PBBFAC,,, | Performed by: INTERNAL MEDICINE

## 2024-05-08 PROCEDURE — 3288F FALL RISK ASSESSMENT DOCD: CPT | Mod: CPTII,S$GLB,, | Performed by: INTERNAL MEDICINE

## 2024-05-08 PROCEDURE — 3074F SYST BP LT 130 MM HG: CPT | Mod: CPTII,S$GLB,, | Performed by: INTERNAL MEDICINE

## 2024-05-08 RX ORDER — LANOLIN ALCOHOL/MO/W.PET/CERES
100 CREAM (GRAM) TOPICAL DAILY
COMMUNITY
End: 2024-06-13 | Stop reason: CLARIF

## 2024-05-08 RX ORDER — DULOXETIN HYDROCHLORIDE 60 MG/1
60 CAPSULE, DELAYED RELEASE ORAL DAILY
Qty: 90 CAPSULE | Refills: 3 | Status: SHIPPED | OUTPATIENT
Start: 2024-05-08

## 2024-05-08 RX ORDER — ARIPIPRAZOLE 2 MG/1
2 TABLET ORAL EVERY MORNING
Qty: 30 TABLET | Refills: 1 | Status: SHIPPED | OUTPATIENT
Start: 2024-05-08 | End: 2025-05-08

## 2024-05-08 NOTE — PROGRESS NOTES
Subjective:       Patient ID: Cori Avalos is a 74 y.o. female.    Chief Complaint: Follow-up   Follow-up  Associated symptoms include arthralgias, fatigue and myalgias. Pertinent negatives include no chest pain or joint swelling.       Gyn: Dr Hunt   MM/23  Dexa: 2019 Osteopenia -1.5 & -1.9  w fragility fracture.   Tx:  Prolia mgmt in [place] at rheum [facility]  Colonoscopy: hx adenoma Dr Jeff // 2023 r/c only if issues future   Immunizations: Flu: Y Tdap: UC  cat bite Pneumovax:  Prevnar 13: 2009 Shingles: old  Covid: yes   Smoker:  Never   Eye: [place]      F/u   Lab review   Just had sleep study - recommend changing to Bipap.  Complains of fatigue no energy sleepy  Cardio Dr Colin      Complains of increasing depression feels her chronic medical issues in right shoulder is now frozen is contributing.   Taking Cymbalta 90.  Interested in additional medicine.  Remote history of taking Abilify patient does not remember taking medication not sure if side effects.   Discussed Rexulti since brand name will try Abilify 1st    Right shoulder chronic pain frozen trial of injections before rhizotomy were not helpful.     Mgmt Dr Carbajal          MCI w memory loss: short term. Never did neuropsych eval, recommended by neuro.   Has seen Neuro clinic past. .  Did see Neuro clinic.  Daughter reports will blurts out conversations or words.   Off Valium, Elavil.  ? Depression, meds, Cpap isseus.        OAB incontinence bladder & bowel. hx frequent UTI.  + depends use      Depression: uncontrolled  Rx Cymbalta 90               Insomnia: off Valium 10. / SE Seroquel nightmares     Glucose intolerance:  Elevated fasting glucose 103 A1c 5.0     HTN/Paroxysmal A fib: + loop recorder// due to anemia Eliquis decreased to 2.5.  Rx Eliquis 2.5 bid, Metoprolol    Mgmt Cardio Dr Colin      CKD stage 2/3:  Cyclic Cr 0.9-1.0   GFR 50s     Anemia chronic disease/iron deficiency:  Cyclic  Hb 11.3-13. Rx  Recommend 2-3 x wkly  iron supplement.    Lowest iron Sat 14.  Thrombocytopenia:  Cyclic platelet 145.    RLS:  Controlled Rx Requip 5              Right leg vein insuffiencey  w cardio Dr Colin.  Q 3-4 M      Back DDD; s/p injections// Dr Flanagan + pain stimulator. /prev  Dr Bocanegra - now Dr Carbajal    off chronic Valium               Chronic arthralgias:  Shoulders B & R>L + bicep tears right frozen. Stopped PT. Limiting ADLs.  Rx Tramadol 3 x daily, hx prn THC. Right knee OA - bad. Poss replacement in future.    Mgmt Dr Carbajal prev pain mgmt Dr Bocanegra.      Cutaneous sarcoid: hx skin nodule- some questions if truly dx. Hx biopsy Sarcoid then 2nd opinion patient was told not sarcoid.     NAHUN:  Rx  auto Cpap w o2. - 2024 change to Bipap   mgmt  Dr ARLEEN varela/sleep              Hx of hospital admit w unresponsiveness, elevated CO2 due to Nahun & sedatives.          GERD: controlled Rx Nexium 40     Allergies:  Controlled Rx astelin, Flunisolide nasal, H1         ____________________________________________________________________________________________________  Assessment & Plan:  1. Recurrent major depressive disorder, in partial remission  - ARIPiprazole (ABILIFY) 2 MG Tab; Take 1 tablet (2 mg total) by mouth every morning.  Dispense: 30 tablet; Refill: 1    2. Paroxysmal atrial fibrillation    3. Stage 3a chronic kidney disease    4. Thrombocytopenia    5. Anemia, unspecified type    6. Iron deficiency anemia, unspecified iron deficiency anemia type  - Iron and TIBC; Future  - CBC Without Differential; Future    7. Moderate episode of recurrent major depressive disorder  - DULoxetine (CYMBALTA) 60 MG capsule; Take 1 capsule (60 mg total) by mouth once daily.  Dispense: 90 capsule; Refill: 3    8. NAHUN (obstructive sleep apnea)    9. Factor V Leiden    10. Cutaneous sarcoidosis    11. Medicare annual wellness visit, subsequent  - Iron and TIBC; Future  - CBC Without Differential; Future     Recurrent major depressive disorder, in  partial remission  -     ARIPiprazole (ABILIFY) 2 MG Tab; Take 1 tablet (2 mg total) by mouth every morning.  Dispense: 30 tablet; Refill: 1    Paroxysmal atrial fibrillation    Stage 3a chronic kidney disease    Thrombocytopenia  Comments:  Recheck CBC    Anemia, unspecified type    Iron deficiency anemia, unspecified iron deficiency anemia type  -     Iron and TIBC; Future; Expected date: 05/08/2024  -     CBC Without Differential; Future; Expected date: 05/08/2024    Moderate episode of recurrent major depressive disorder  -     DULoxetine (CYMBALTA) 60 MG capsule; Take 1 capsule (60 mg total) by mouth once daily.  Dispense: 90 capsule; Refill: 3    KENROY (obstructive sleep apnea)    Factor V Leiden    Cutaneous sarcoidosis    Medicare annual wellness visit, subsequent  -     Iron and TIBC; Future; Expected date: 05/08/2024  -     CBC Without Differential; Future; Expected date: 05/08/2024        Continue to work on regular exercise, maintain healthy weight, balanced diet. Avoid unhealthy habits: smoking, excessive alcohol intake.     Disclaimer: This note was partly generated using dictation software which may occasionally result in transcription errors  ____________________________________________________________________________________________________  Review of Systems:  Review of Systems   Constitutional:  Positive for fatigue. Negative for appetite change.   HENT:  Negative for nosebleeds.    Eyes:  Negative for pain.   Respiratory:  Negative for choking.    Cardiovascular:  Negative for chest pain.   Gastrointestinal:  Negative for blood in stool.   Genitourinary:  Negative for hematuria.   Musculoskeletal:  Positive for arthralgias and myalgias. Negative for joint swelling.   Skin:  Negative for pallor.   Neurological:  Negative for facial asymmetry.   Hematological:  Does not bruise/bleed easily.   Psychiatric/Behavioral:  Positive for dysphoric mood. Negative for confusion.        Objective:     Wt  Readings from Last 3 Encounters:   05/08/24 101.9 kg (224 lb 12.1 oz)   02/19/24 104.3 kg (229 lb 15 oz)   01/23/24 104.3 kg (230 lb)     BP Readings from Last 3 Encounters:   05/08/24 100/68   02/19/24 (!) 147/74   11/13/23 124/84       Lab Results   Component Value Date    WBC 3.89 (L) 02/19/2024    HGB 11.3 (L) 02/19/2024    HCT 32.5 (L) 02/19/2024     (L) 02/19/2024     04/04/2024    K 3.7 04/04/2024     02/19/2024    ALT 16 04/04/2024    AST 22 04/04/2024    CO2 29 04/04/2024    CREATININE 1.08 04/04/2024    BUN 25 (H) 04/04/2024     02/19/2024      Hemoglobin A1C   Date Value Ref Range Status   01/18/2024 5.0 0.0 - 5.6 % Final     Comment:     Reference Interval:  5.0 - 5.6 Normal   5.7 - 6.4 High Risk   > 6.5 Diabetic      Hgb A1c results are standardized based on the (NGSP) National   Glycohemoglobin Standardization Program.      Hemoglobin A1C levels are related to mean serum/plasma glucose   during the preceding 2-3 months.        05/16/2023 4.9 0.0 - 5.6 % Final     Comment:     Reference Interval:  5.0 - 5.6 Normal   5.7 - 6.4 High Risk   > 6.5 Diabetic      Hgb A1c results are standardized based on the (NGSP) National   Glycohemoglobin Standardization Program.      Hemoglobin A1C levels are related to mean serum/plasma glucose   during the preceding 2-3 months.        04/06/2021 5.2 0.0 - 5.6 % Final     Comment:     Reference Interval:  5.0 - 5.6 Normal   5.7 - 6.4 High Risk   > 6.5 Diabetic      Hgb A1c results are standardized based on the (NGSP) National   Glycohemoglobin Standardization Program.      Hemoglobin A1C levels are related to mean serum/plasma glucose   during the preceding 2-3 months.           Lab Results   Component Value Date    TSH 2.860 01/18/2024    TSH 3.761 08/25/2022    TSH 1.512 08/05/2021     Lab Results   Component Value Date    FREET4 1.36 05/07/2020    FREET4 0.90 01/14/2013    FREET4 1.09 06/19/2012     Lab Results   Component Value Date     LDLCALC 65.2 01/18/2024    LDLCALC 74.8 06/08/2023    LDLCALC 61 02/12/2022     Lab Results   Component Value Date    TRIG 144 01/18/2024    TRIG 91 06/08/2023    TRIG 131 02/12/2022            Physical Exam  Constitutional:       Appearance: Normal appearance. She is obese.      Comments: FWW here w    HENT:      Head: Normocephalic and atraumatic.   Eyes:      Extraocular Movements: Extraocular movements intact.      Pupils: Pupils are equal, round, and reactive to light.   Cardiovascular:      Rate and Rhythm: Normal rate and regular rhythm.   Pulmonary:      Effort: Pulmonary effort is normal.      Breath sounds: Normal breath sounds.   Musculoskeletal:      Right lower leg: No edema.      Left lower leg: No edema.   Neurological:      Mental Status: She is alert.         Medication List with Changes/Refills   New Medications    ARIPIPRAZOLE (ABILIFY) 2 MG TAB    Take 1 tablet (2 mg total) by mouth every morning.   Current Medications    ALBUTEROL (PROVENTIL/VENTOLIN HFA) 90 MCG/ACTUATION INHALER    Inhale 2 puffs into the lungs every 4 (four) hours as needed for Wheezing or Shortness of Breath.    APIXABAN (ELIQUIS) 2.5 MG TAB    Take 2.5 mg by mouth 2 (two) times daily.    BUDESONIDE/GLYCOPYR/FORMOTEROL (BREZTRI AEROSPHERE INHL)    Inhale into the lungs.    CALCIUM 500 500 MG CALCIUM (1,250 MG) TABLET    Take 1 tablet by mouth once daily.    CANNABIDIOL (EPIDIOLEX) 100 MG/ML    5 mg/kg nightly. 0.5 tinture    CHOLECALCIFEROL, VITAMIN D3, (VITAMIN D3) 25 MCG (1,000 UNIT) CAPSULE    Take 1,000 Units by mouth once daily.    CYANOCOBALAMIN (VITAMIN B-12) 1000 MCG TABLET    Take 100 mcg by mouth once daily.    DENOSUMAB (PROLIA) 60 MG/ML SYRG    Inject 60 mg into the skin every 6 (six) months.     DIPHENOXYLATE-ATROPINE 2.5-0.025 MG (LOMOTIL) 2.5-0.025 MG PER TABLET    TAKE ONE TABLET BY MOUTH FOUR TIMES DAILY AS NEEDED FOR DIARRHEA    DOXYCYCLINE (VIBRA-TABS) 100 MG TABLET    Take 1 tablet (100 mg total) by  mouth 2 (two) times daily.    DULOXETINE (CYMBALTA) 30 MG CAPSULE    TAKE ONE CAPSULE BY MOUTH EVERY DAY    FERROUS SULFATE (IRON ORAL)    Take by mouth.    FLUNISOLIDE 25 MCG, 0.025%, (NASALIDE) 25 MCG (0.025 %) SPRY    instill one SPRAY into each nostril EVERY DAY    FUROSEMIDE (LASIX) 40 MG TABLET    Take 40 mg by mouth every other day.    GUAIFENESIN (MUCINEX) 600 MG 12 HR TABLET    Take 1,200 mg by mouth 2 (two) times daily as needed.     IBUPROFEN (ADVIL,MOTRIN) 800 MG TABLET    Take 800 mg by mouth every 6 (six) hours as needed.     KETOCONAZOLE (NIZORAL) 2 % SHAMPOO    Apply topically every 7 days.    LEVOCETIRIZINE (XYZAL) 5 MG TABLET    Take 5 mg by mouth every evening.    METOPROLOL TARTRATE (LOPRESSOR) 50 MG TABLET    Take 75 mg by mouth 2 (two) times daily. Pt taking 1 & a half tablets twice a day.    NYSTATIN-TRIAMCINOLONE (MYCOLOG II) CREAM    Apply 1 g topically as needed.    ONDANSETRON (ZOFRAN-ODT) 4 MG TBDL    Take 1 tablet (4 mg total) by mouth every 8 (eight) hours as needed.    PANTOPRAZOLE (PROTONIX) 40 MG TABLET    Take 1 tablet by mouth once a day 30 min before breakfast    POTASSIUM CHLORIDE SA (K-DUR,KLOR-CON M) 10 MEQ TABLET    TAKE ONE TABLET BY MOUTH EVERY OTHER DAY, take with lasix    PRAMIPEXOLE (MIRAPEX) 0.5 MG TABLET    Take 1.5 mg by mouth 2 (two) times a day.    TIZANIDINE (ZANAFLEX) 2 MG TABLET    Take 4 mg by mouth nightly.    TRAMADOL (ULTRAM) 50 MG TABLET    Take 50 mg by mouth every 6 (six) hours as needed for Pain. Prn Dr Calvin   Changed and/or Refilled Medications    Modified Medication Previous Medication    DULOXETINE (CYMBALTA) 60 MG CAPSULE DULoxetine (CYMBALTA) 60 MG capsule       Take 1 capsule (60 mg total) by mouth once daily.    TAKE 1 CAPSULE BY MOUTH ONCE DAILY.   Discontinued Medications    BUDESONIDE-FORMOTEROL 80-4.5 MCG (SYMBICORT) 80-4.5 MCG/ACTUATION HFAA    Inhale 2 puffs into the lungs 2 (two) times a day. Controller    CHOLESTYRAMINE (QUESTRAN) 4 GRAM  PACKET    Take 4 g by mouth once daily.    FLUTICASONE FUROATE NASL    by Nasal route.    PREDNISONE (DELTASONE) 10 MG TABLET    Take 1 tablet (10 mg total) by mouth every morning.

## 2024-05-13 ENCOUNTER — LAB VISIT (OUTPATIENT)
Dept: LAB | Facility: HOSPITAL | Age: 74
End: 2024-05-13
Attending: INTERNAL MEDICINE
Payer: MEDICARE

## 2024-05-13 DIAGNOSIS — D50.9 IRON DEFICIENCY ANEMIA, UNSPECIFIED IRON DEFICIENCY ANEMIA TYPE: ICD-10-CM

## 2024-05-13 DIAGNOSIS — Z00.00 MEDICARE ANNUAL WELLNESS VISIT, SUBSEQUENT: ICD-10-CM

## 2024-05-13 LAB
ERYTHROCYTE [DISTWIDTH] IN BLOOD BY AUTOMATED COUNT: 13.5 % (ref 11.5–14.5)
HCT VFR BLD AUTO: 34.5 % (ref 37–48.5)
HGB BLD-MCNC: 11.5 G/DL (ref 12–16)
IRON SERPL-MCNC: 88 UG/DL (ref 30–160)
MCH RBC QN AUTO: 33.3 PG (ref 27–31)
MCHC RBC AUTO-ENTMCNC: 33.3 G/DL (ref 32–36)
MCV RBC AUTO: 100 FL (ref 82–98)
PLATELET # BLD AUTO: 177 K/UL (ref 150–450)
PMV BLD AUTO: 10.3 FL (ref 9.2–12.9)
RBC # BLD AUTO: 3.45 M/UL (ref 4–5.4)
SATURATED IRON: 20 % (ref 20–50)
TOTAL IRON BINDING CAPACITY: 434 UG/DL (ref 250–450)
TRANSFERRIN SERPL-MCNC: 293 MG/DL (ref 200–375)
WBC # BLD AUTO: 6.41 K/UL (ref 3.9–12.7)

## 2024-05-13 PROCEDURE — 36415 COLL VENOUS BLD VENIPUNCTURE: CPT | Mod: PO | Performed by: INTERNAL MEDICINE

## 2024-05-13 PROCEDURE — 83540 ASSAY OF IRON: CPT | Performed by: INTERNAL MEDICINE

## 2024-05-13 PROCEDURE — 85027 COMPLETE CBC AUTOMATED: CPT | Performed by: INTERNAL MEDICINE

## 2024-05-14 ENCOUNTER — TELEPHONE (OUTPATIENT)
Dept: FAMILY MEDICINE | Facility: CLINIC | Age: 74
End: 2024-05-14
Payer: MEDICARE

## 2024-05-14 DIAGNOSIS — N18.31 STAGE 3A CHRONIC KIDNEY DISEASE: Primary | ICD-10-CM

## 2024-05-14 DIAGNOSIS — D50.9 IRON DEFICIENCY ANEMIA, UNSPECIFIED IRON DEFICIENCY ANEMIA TYPE: ICD-10-CM

## 2024-05-14 NOTE — TELEPHONE ENCOUNTER
I spoke with patient today and she's asking if she needs any more labs done because her results are still showing she's anemic?

## 2024-05-14 NOTE — TELEPHONE ENCOUNTER
----- Message from Allyson Sheffield sent at 5/14/2024  8:55 AM CDT -----  Regarding: advise  Contact: patient  Type: Needs Medical Advice  Who Called:  patient   Symptoms (please be specific):    How long has patient had these symptoms:    Pharmacy name and phone #:    Best Call Back Number: [phone]    Additional Information: would like to discuss results call to advise

## 2024-05-15 NOTE — TELEPHONE ENCOUNTER
1) mild anemia.  Iron stores right at low normal.   She needs to take iron supplement 3-4 x weekly or MVI w iron, b12 and folate daily.     We can r/c iron and CBC in 3-4 months

## 2024-06-12 ENCOUNTER — TELEPHONE (OUTPATIENT)
Dept: FAMILY MEDICINE | Facility: CLINIC | Age: 74
End: 2024-06-12

## 2024-06-12 NOTE — TELEPHONE ENCOUNTER
----- Message from Tayler Billings sent at 6/12/2024 12:34 PM CDT -----  Type: Needs Medical Advice  Who Called:  pt  Best Call Back Number: [phone]    Additional Information: Pt is calling the office asking to speak with the nurse to see the proper way to get off of abilify. Please call back and advise.

## 2024-06-13 ENCOUNTER — TELEPHONE (OUTPATIENT)
Dept: FAMILY MEDICINE | Facility: CLINIC | Age: 74
End: 2024-06-13
Payer: MEDICARE

## 2024-06-13 NOTE — TELEPHONE ENCOUNTER
Patient stated she wants to wing herself off of Abilify because it's making her have more depression and she feels like a nervous wreck. She also stated she even went down half a tablet and it's still making her feel the same way.    Patient stated she will like for Dr. López to advise her on how to stop taking this medication, so she will wait until she's back in office.

## 2024-06-27 RX ORDER — DIPHENOXYLATE HYDROCHLORIDE AND ATROPINE SULFATE 2.5; .025 MG/1; MG/1
1 TABLET ORAL 4 TIMES DAILY PRN
Qty: 30 TABLET | Refills: 0 | Status: SHIPPED | OUTPATIENT
Start: 2024-06-27

## 2024-06-27 NOTE — TELEPHONE ENCOUNTER
Refill Routing Note   Medication(s) are not appropriate for processing by [facility] for the following reason(s):      Medication outside of protocol    [facility] action(s):  Route Care Due:  None identified            Appointments  past 12m or future 3m with PCP    Date Provider   Last Visit   5/8/2024 Arnoldo Miller MD   Next Visit   Visit date not found Arnoldo Miller MD   ED visits in past 90 days: 0        Note composed:1:51 PM 06/27/2024

## 2024-06-27 NOTE — TELEPHONE ENCOUNTER
Please approve for diphenoxylate-atropine 2.5-0.025 mg (LOMOTIL)     Last OV 05/08/24  Last refill date 08/22/22  Last labs 05/13/24    Next appt ---

## 2024-06-27 NOTE — TELEPHONE ENCOUNTER
----- Message from Kendall Loving sent at 6/27/2024  1:32 PM CDT -----  Regarding: refill  Type:  RX Refill Request    Who Called:  Conchis [facility]  Refill or New Rx:  refill  RX Name and Strength:   Disp Refills Start End   diphenoxylate-atropine 2.5-0.025 mg (LOMOTIL) 2.5-0.025 mg per tablet 30 tablet 0 8/22/2022 -   Sig: TAKE ONE TABLET BY MOUTH FOUR TIMES DAILY AS NEEDED FOR DIARRHEA   Sent to pharmacy as: diphenoxylate-atropine 2.5-0.025 mg (LOMOTIL) 2.5-0.025 mg per tablet   E-Prescribing Status: Receipt confirmed by pharmacy (8/22/2022  1:01 PM CDT)       How is the patient currently taking it? (ex. 1XDay):  see above   Is this a 30 day or 90 day RX:  see above   Preferred Pharmacy with phone number:    [facility] - [address] [address]  Phone: [phone] Fax: [phone]    Local or Mail Order:  local   Ordering Provider:  Dr Miller   Best Call Back Number:  [phone]  Additional Information:  thanks

## 2024-07-31 ENCOUNTER — TELEPHONE (OUTPATIENT)
Dept: FAMILY MEDICINE | Facility: CLINIC | Age: 74
End: 2024-07-31
Payer: MEDICARE

## 2024-07-31 NOTE — TELEPHONE ENCOUNTER
----- Message from Iraida Hartman sent at 7/31/2024  7:19 AM CDT -----  Regarding: Needs same day appt  Type:  Same Day Appointment Request    Caller is requesting a same day appointment.  Caller declined first available appointment listed below.      Name of Caller:  Pt  When is the first available appointment?  Tomorrow  Symptoms:  dizziness, stomach problems  Best Call Back Number:  [phone]    Additional Information:

## 2024-08-01 ENCOUNTER — OFFICE VISIT (OUTPATIENT)
Dept: FAMILY MEDICINE | Facility: CLINIC | Age: 74
End: 2024-08-01
Payer: MEDICARE

## 2024-08-01 VITALS
DIASTOLIC BLOOD PRESSURE: 62 MMHG | HEIGHT: 63 IN | HEART RATE: 62 BPM | WEIGHT: 232.56 LBS | BODY MASS INDEX: 41.21 KG/M2 | OXYGEN SATURATION: 95 % | SYSTOLIC BLOOD PRESSURE: 136 MMHG

## 2024-08-01 DIAGNOSIS — E66.2 CLASS 3 OBESITY WITH ALVEOLAR HYPOVENTILATION, SERIOUS COMORBIDITY, AND BODY MASS INDEX (BMI) OF 40.0 TO 44.9 IN ADULT: ICD-10-CM

## 2024-08-01 DIAGNOSIS — H65.193 ACUTE EFFUSION OF BOTH MIDDLE EARS: ICD-10-CM

## 2024-08-01 DIAGNOSIS — I10 ESSENTIAL (PRIMARY) HYPERTENSION: ICD-10-CM

## 2024-08-01 DIAGNOSIS — D50.9 IRON DEFICIENCY ANEMIA, UNSPECIFIED IRON DEFICIENCY ANEMIA TYPE: ICD-10-CM

## 2024-08-01 DIAGNOSIS — R09.81 NASAL CONGESTION: ICD-10-CM

## 2024-08-01 DIAGNOSIS — R42 VERTIGO: Primary | ICD-10-CM

## 2024-08-01 PROBLEM — E66.813 CLASS 3 OBESITY WITH ALVEOLAR HYPOVENTILATION, SERIOUS COMORBIDITY, AND BODY MASS INDEX (BMI) OF 40.0 TO 44.9 IN ADULT: Status: ACTIVE | Noted: 2024-08-01

## 2024-08-01 PROCEDURE — 99214 OFFICE O/P EST MOD 30 MIN: CPT | Mod: S$GLB,,, | Performed by: NURSE PRACTITIONER

## 2024-08-01 PROCEDURE — 1126F AMNT PAIN NOTED NONE PRSNT: CPT | Mod: CPTII,S$GLB,, | Performed by: NURSE PRACTITIONER

## 2024-08-01 PROCEDURE — 1101F PT FALLS ASSESS-DOCD LE1/YR: CPT | Mod: CPTII,S$GLB,, | Performed by: NURSE PRACTITIONER

## 2024-08-01 PROCEDURE — 1157F ADVNC CARE PLAN IN RCRD: CPT | Mod: CPTII,S$GLB,, | Performed by: NURSE PRACTITIONER

## 2024-08-01 PROCEDURE — 3288F FALL RISK ASSESSMENT DOCD: CPT | Mod: CPTII,S$GLB,, | Performed by: NURSE PRACTITIONER

## 2024-08-01 PROCEDURE — 3075F SYST BP GE 130 - 139MM HG: CPT | Mod: CPTII,S$GLB,, | Performed by: NURSE PRACTITIONER

## 2024-08-01 PROCEDURE — 1160F RVW MEDS BY RX/DR IN RCRD: CPT | Mod: CPTII,S$GLB,, | Performed by: NURSE PRACTITIONER

## 2024-08-01 PROCEDURE — 99999 PR PBB SHADOW E&M-EST. PATIENT-LVL V: CPT | Mod: PBBFAC,,, | Performed by: NURSE PRACTITIONER

## 2024-08-01 PROCEDURE — 3078F DIAST BP <80 MM HG: CPT | Mod: CPTII,S$GLB,, | Performed by: NURSE PRACTITIONER

## 2024-08-01 PROCEDURE — 1159F MED LIST DOCD IN RCRD: CPT | Mod: CPTII,S$GLB,, | Performed by: NURSE PRACTITIONER

## 2024-08-01 RX ORDER — FERROUS GLUCONATE 324(38)MG
324 TABLET ORAL
Qty: 30 TABLET | Refills: 1 | Status: SHIPPED | OUTPATIENT
Start: 2024-08-01

## 2024-08-01 RX ORDER — LEVOCETIRIZINE DIHYDROCHLORIDE 5 MG/1
5 TABLET, FILM COATED ORAL NIGHTLY
Qty: 90 TABLET | Refills: 0 | Status: SHIPPED | OUTPATIENT
Start: 2024-08-01

## 2024-08-01 NOTE — PATIENT INSTRUCTIONS
saline spray/ mist daily-   below nose well,  follow with flunisolide nasal spray daily.  Xyzal nightly

## 2024-08-01 NOTE — PROGRESS NOTES
THIS DOCUMENT WAS MADE IN PART WITH VOICE RECOGNITION SOFTWARE.  OCCASIONALLY THIS SOFTWARE WILL MISINTERPRET WORDS OR PHRASES.     Assessment and Plan:    Vertigo  Comments:  stay well hydrated   suspect fluid in ears is contributing to vertigo   recommend further evaluation by ENT  Orders:  -     CBC Without Differential; Future; Expected date: 08/01/2024  -     Comprehensive Metabolic Panel; Future; Expected date: 08/01/2024  -     Ambulatory referral/consult to ENT; Future; Expected date: 08/08/2024    Iron deficiency anemia, unspecified iron deficiency anemia type  Comments:  resume iron supplement   we will recheck iron  Orders:  -     CBC Without Differential; Future; Expected date: 08/01/2024  -     Comprehensive Metabolic Panel; Future; Expected date: 08/01/2024  -     Ferritin; Future; Expected date: 08/01/2024  -     Iron and TIBC; Future; Expected date: 08/01/2024  -     ferrous gluconate (FERGON) 324 MG tablet; Take 1 tablet (324 mg total) by mouth daily with breakfast.  Dispense: 30 tablet; Refill: 1    Acute effusion of both middle ears  Comments:  saline spray/ mist daily  continue flunisolide nasal spray daily.  Xyzal nightly  Orders:  -     levocetirizine (XYZAL) 5 MG tablet; Take 1 tablet (5 mg total) by mouth nightly.  Dispense: 90 tablet; Refill: 0  -     Ambulatory referral/consult to ENT; Future; Expected date: 08/08/2024    Nasal congestion  Comments:  saline spray/ mist daily   continue flunisolide nasal spray daily.   Xyzal nightly  Orders:  -     levocetirizine (XYZAL) 5 MG tablet; Take 1 tablet (5 mg total) by mouth nightly.  Dispense: 90 tablet; Refill: 0  -     Ambulatory referral/consult to ENT; Future; Expected date: 08/08/2024    Essential (primary) hypertension  Comments:  controlled   continue regimen    Class 3 obesity with alveolar hypoventilation, serious comorbidity, and body mass index (BMI) of 40.0 to 44.9 in adult  Comments:  continue work on diet.             Visit  summary:    suspect fluid in ears is contributing to vertigo    recommend further evaluation by ENT     Will check labs    Advised on diagnosis, medications and symptomatic treatment.       Emergent conditions/ ER precautions discussed.      Follow up in 3 months (on 11/1/2024), or if symptoms worsen or fail to improve, for Follow-up with PCP- Arvind Soto.   ______________________________________________________________________  Subjective:    Chief Complaint:  dizziness    HPI:  Cori is a 74 y.o. year old female here concerned regarding dizziness x 3 weeks.    Treated in ER on 7/20/24 for stomach virus right after dizziness started.  States GI symptoms resolved.  She reports 5-6 episodes in the past 3 weeks- states random- describes as room spinning- lasts between 1-2 hrs.  Resolves without intervention. Exacerbated when she turns her head. She reports that she will occasional have a headache after an episode.  States that daughter checked pulse, pulse ox and b/p during an episode- reported normal.  She denies chest pain, sob,  visual disturbance and LOC.  Patient reports  sinus congestion  with a feeling of fullness to ears.  Uses flunisolide nasal spray daily.  Patient reports that she followed up with cardiologist, Dr. Colin earlier this week for  postop check- s/p  saphenous vein embolism- stent placement on June 18th.     She reports also being seen at  urgent care on Monday for UTI s/s- treated with Macrobid.      Patient reports she  is trying to stay well hydrated. Doesn't drink enough water.      She is requesting to resume rx for Iron supplement- hx of  iron-deficiency.       Medications:  Current Outpatient Medications on File Prior to Visit   Medication Sig Dispense Refill    apixaban (ELIQUIS) 2.5 mg Tab Take 2.5 mg by mouth 2 (two) times daily.      budesonide/glycopyr/formoterol (BREZTRI AEROSPHERE INHL) Inhale into the lungs.      cholecalciferol, vitamin D3, (VITAMIN D3) 25 mcg (1,000 unit)  capsule Take 1,000 Units by mouth once daily.      denosumab (PROLIA) 60 mg/mL Syrg Inject 60 mg into the skin every 6 (six) months.       DULoxetine (CYMBALTA) 30 MG capsule TAKE ONE CAPSULE BY MOUTH EVERY DAY 90 capsule 2    DULoxetine (CYMBALTA) 60 MG capsule Take 1 capsule (60 mg total) by mouth once daily. 90 capsule 3    flunisolide 25 mcg, 0.025%, (NASALIDE) 25 mcg (0.025 %) Spry instill one SPRAY into each nostril EVERY DAY 25 mL 5    metoprolol tartrate (LOPRESSOR) 50 MG tablet Take 75 mg by mouth 2 (two) times daily. Pt taking 1 & a half tablets twice a day.      pantoprazole (PROTONIX) 40 MG tablet Take 1 tablet by mouth once a day 30 min before breakfast 90 tablet 3    pramipexole (MIRAPEX) 0.5 MG tablet Take 1.5 mg by mouth 2 (two) times a day.      tiZANidine (ZANAFLEX) 2 MG tablet Take 4 mg by mouth nightly.      albuterol (PROVENTIL/VENTOLIN HFA) 90 mcg/actuation inhaler Inhale 2 puffs into the lungs every 4 (four) hours as needed for Wheezing or Shortness of Breath. (Patient not taking: Reported on 5/8/2024) 18 g 0    ARIPiprazole (ABILIFY) 2 MG Tab Take 1 tablet (2 mg total) by mouth every morning. 30 tablet 1    dicyclomine (BENTYL) 20 mg tablet Take 1 tablet (20 mg total) by mouth 2 (two) times daily. 20 tablet 0    diphenoxylate-atropine 2.5-0.025 mg (LOMOTIL) 2.5-0.025 mg per tablet Take 1 tablet by mouth 4 (four) times daily as needed. (Patient not taking: Reported on 8/1/2024) 30 tablet 0    doxycycline (VIBRA-TABS) 100 MG tablet Take 1 tablet (100 mg total) by mouth 2 (two) times daily. 14 tablet 0    famotidine (PEPCID) 20 MG tablet Take 1 tablet (20 mg total) by mouth 2 (two) times daily. 20 tablet 0    furosemide (LASIX) 40 MG tablet Take 40 mg by mouth every other day. (Patient not taking: Reported on 8/1/2024)  3    guaiFENesin (MUCINEX) 600 mg 12 hr tablet Take 1,200 mg by mouth 2 (two) times daily as needed.  (Patient not taking: Reported on 8/1/2024)      ibuprofen (ADVIL,MOTRIN) 800  MG tablet Take 800 mg by mouth every 6 (six) hours as needed.  (Patient not taking: Reported on 8/1/2024)      MIRABEGRON ORAL Take by mouth.      ondansetron (ZOFRAN-ODT) 4 MG TbDL Take 1 tablet (4 mg total) by mouth every 6 (six) hours as needed. (Patient not taking: Reported on 8/1/2024) 12 tablet 0    potassium chloride SA (K-DUR,KLOR-CON M) 10 MEQ tablet TAKE ONE TABLET BY MOUTH EVERY OTHER DAY, take with lasix (Patient not taking: Reported on 8/1/2024) 45 tablet 2    traMADoL (ULTRAM) 50 mg tablet Take 50 mg by mouth every 6 (six) hours as needed for Pain. Prn Dr Calvin (Patient not taking: Reported on 8/1/2024)      [DISCONTINUED] ferrous sulfate (IRON ORAL) Take by mouth.      [DISCONTINUED] ketoconazole (NIZORAL) 2 % shampoo Apply topically every 7 days. 120 mL 2    [DISCONTINUED] levocetirizine (XYZAL) 5 MG tablet Take 5 mg by mouth nightly as needed. (Patient not taking: Reported on 8/1/2024)      [DISCONTINUED] nystatin-triamcinolone (MYCOLOG II) cream Apply 1 g topically as needed.      [DISCONTINUED] ondansetron (ZOFRAN-ODT) 4 MG TbDL Take 1 tablet (4 mg total) by mouth every 8 (eight) hours as needed. 30 tablet 0     No current facility-administered medications on file prior to visit.       Review of Systems:  Review of Systems   Constitutional:  Negative for chills, fatigue and fever.   HENT:  Positive for congestion. Negative for ear discharge, ear pain, facial swelling, postnasal drip, rhinorrhea, sinus pressure and sinus pain.    Eyes:  Negative for visual disturbance.   Respiratory:  Negative for cough and shortness of breath.    Cardiovascular:  Negative for chest pain, palpitations and leg swelling.   Gastrointestinal:  Positive for nausea. Negative for abdominal pain and vomiting.   Neurological:  Positive for dizziness. Negative for weakness.       Past Medical History:  Past Medical History:   Diagnosis Date    AC (acromioclavicular) joint bone spurs     Acid reflux 04/14/2011    Adrenal  "insufficiency     Anticoagulant long-term use     Biceps muscle tear     Right    Bilateral lower extremity edema     Depression     Encounter for blood transfusion 1978    Factor V Leiden     Falls frequently     General anesthetics causing adverse effect in therapeutic use     at [facility]; incapacitated for 3 wks with hallucinations and delerium following sgy    History of supraventricular tachycardia     Hypertension     IBS (irritable bowel syndrome)     Immunocompromised due to corticosteroids 04/12/2016    no longer on steroids    Long term current use of anticoagulant     Meralgia paresthetica of left side     Osteoarthritis     Osteoporosis     Restless leg syndrome     Rotator cuff injury     Right    S/P ablation operation for arrhythmia     Sleep apnea     Bipap       Objective:    Vitals:  Vitals:    08/01/24 1315   BP: 136/62   Pulse: 62   SpO2: 95%   Weight: 105.5 kg (232 lb 9.4 oz)   Height: 5' 3" (1.6 m)   PainSc: 0-No pain       Physical Exam  Vitals and nursing note reviewed.   Constitutional:       General: She is not in acute distress.     Appearance: She is obese.   HENT:      Head: Normocephalic and atraumatic.      Right Ear: No tenderness. A middle ear effusion is present.      Left Ear: No tenderness. A middle ear effusion is present.      Nose: Mucosal edema and congestion present.   Eyes:      General: No scleral icterus.     Conjunctiva/sclera: Conjunctivae normal.   Cardiovascular:      Rate and Rhythm: Normal rate and regular rhythm.   Pulmonary:      Effort: Pulmonary effort is normal. No respiratory distress.      Breath sounds: Normal breath sounds.   Abdominal:      General: Bowel sounds are normal. There is no distension.      Palpations: Abdomen is soft.      Tenderness: There is no abdominal tenderness.   Musculoskeletal:      Right lower leg: No edema.      Left lower leg: No edema.   Skin:     General: Skin is warm and dry.   Neurological:      Mental Status: She is alert and " oriented to person, place, and time.   Psychiatric:         Mood and Affect: Mood normal.         Behavior: Behavior normal.         Thought Content: Thought content normal.         Data:  Lab Results   Component Value Date    WBC 5.39 07/23/2024    HGB 10.5 (L) 07/23/2024    HCT 31.9 (L) 07/23/2024    MCV 97 07/23/2024     07/23/2024      CMP  Sodium   Date Value Ref Range Status   07/23/2024 140 136 - 145 mmol/L Final     Potassium   Date Value Ref Range Status   07/23/2024 4.1 3.5 - 5.1 mmol/L Final     Comment:     Anion Gap reference range revised on 4/28/2023     Chloride   Date Value Ref Range Status   07/23/2024 104 95 - 110 mmol/L Final     CO2   Date Value Ref Range Status   07/23/2024 27 22 - 31 mmol/L Final     Glucose   Date Value Ref Range Status   07/23/2024 98 70 - 110 mg/dL Final     Comment:     The ADA recommends the following guidelines for fasting glucose:    Normal:       less than 100 mg/dL    Prediabetes:  100 mg/dL to 125 mg/dL    Diabetes:     126 mg/dL or higher       BUN   Date Value Ref Range Status   07/23/2024 20 (H) 7 - 18 mg/dL Final     Creatinine   Date Value Ref Range Status   07/23/2024 1.12 0.50 - 1.40 mg/dL Final   01/22/2013 0.9 0.5 - 1.4 mg/dL Final     Calcium   Date Value Ref Range Status   07/23/2024 9.0 8.4 - 10.2 mg/dL Final   01/22/2013 9.6 8.7 - 10.5 mg/dL Final     Total Protein   Date Value Ref Range Status   07/23/2024 6.8 6.0 - 8.4 g/dL Final     Albumin   Date Value Ref Range Status   07/23/2024 4.1 3.5 - 5.2 g/dL Final     Total Bilirubin   Date Value Ref Range Status   07/23/2024 0.9 0.2 - 1.3 mg/dL Final     Alkaline Phosphatase   Date Value Ref Range Status   07/23/2024 90 38 - 145 U/L Final     AST ([place])   Date Value Ref Range Status   04/12/2016 54 (H) 14 - 36 U/L Final     AST   Date Value Ref Range Status   07/23/2024 25 14 - 36 U/L Final     ALT   Date Value Ref Range Status   07/23/2024 13 0 - 35 U/L Final     Anion Gap   Date Value Ref  Range Status   07/23/2024 9 5 - 12 mmol/L Final     Comment:     Anion Gap reference range revised on 4/28/2023 01/22/2013 11 5 - 15 meq/L Final     eGFR   Date Value Ref Range Status   07/23/2024 51 (A) >60 mL/min/1.73 m^2 Final    .      Medical history reviewed, Medications reconciled.          Ruthie Pond, SHARRIP-C  Family Medicine

## 2024-08-12 ENCOUNTER — TELEPHONE (OUTPATIENT)
Dept: FAMILY MEDICINE | Facility: CLINIC | Age: 74
End: 2024-08-12
Payer: MEDICARE

## 2024-08-12 NOTE — TELEPHONE ENCOUNTER
Called pt, she didn't know if she was supposed to do labs her last visit, let her know iron labs were ordered. I scheduled her to come in tomorrow

## 2024-08-12 NOTE — TELEPHONE ENCOUNTER
----- Message from Bethany Freitas sent at 8/12/2024  3:23 PM CDT -----  Type:  Needs Medical Advice    Who Called: the patient  Symptoms (please be specific):  How long has patient had these symptoms:    Pharmacy name and phone #:    Would the patient rather a call back or a response via [facility]? call back  Best Call Back Number: [phone]   Additional Information: Pt states she would like to speak to staff in regards to labs  Please advise  Thanks

## 2024-08-13 ENCOUNTER — LAB VISIT (OUTPATIENT)
Dept: LAB | Facility: HOSPITAL | Age: 74
End: 2024-08-13
Attending: INTERNAL MEDICINE
Payer: MEDICARE

## 2024-08-13 DIAGNOSIS — D50.9 IRON DEFICIENCY ANEMIA, UNSPECIFIED IRON DEFICIENCY ANEMIA TYPE: ICD-10-CM

## 2024-08-13 DIAGNOSIS — R42 VERTIGO: ICD-10-CM

## 2024-08-13 LAB
ALBUMIN SERPL BCP-MCNC: 4 G/DL (ref 3.5–5.2)
ALP SERPL-CCNC: 100 U/L (ref 55–135)
ALT SERPL W/O P-5'-P-CCNC: 15 U/L (ref 10–44)
ANION GAP SERPL CALC-SCNC: 16 MMOL/L (ref 8–16)
AST SERPL-CCNC: 17 U/L (ref 10–40)
BILIRUB SERPL-MCNC: 0.9 MG/DL (ref 0.1–1)
BUN SERPL-MCNC: 26 MG/DL (ref 8–23)
CALCIUM SERPL-MCNC: 10.2 MG/DL (ref 8.7–10.5)
CHLORIDE SERPL-SCNC: 107 MMOL/L (ref 95–110)
CO2 SERPL-SCNC: 21 MMOL/L (ref 23–29)
CREAT SERPL-MCNC: 1.2 MG/DL (ref 0.5–1.4)
ERYTHROCYTE [DISTWIDTH] IN BLOOD BY AUTOMATED COUNT: 13.2 % (ref 11.5–14.5)
EST. GFR  (NO RACE VARIABLE): 46.9 ML/MIN/1.73 M^2
FERRITIN SERPL-MCNC: 102 NG/ML (ref 20–300)
GLUCOSE SERPL-MCNC: 146 MG/DL (ref 70–110)
HCT VFR BLD AUTO: 39.5 % (ref 37–48.5)
HGB BLD-MCNC: 13 G/DL (ref 12–16)
IRON SERPL-MCNC: 82 UG/DL (ref 30–160)
MCH RBC QN AUTO: 32.6 PG (ref 27–31)
MCHC RBC AUTO-ENTMCNC: 32.9 G/DL (ref 32–36)
MCV RBC AUTO: 99 FL (ref 82–98)
PLATELET # BLD AUTO: 223 K/UL (ref 150–450)
PMV BLD AUTO: 10.8 FL (ref 9.2–12.9)
POTASSIUM SERPL-SCNC: 3.9 MMOL/L (ref 3.5–5.1)
PROT SERPL-MCNC: 8 G/DL (ref 6–8.4)
RBC # BLD AUTO: 3.99 M/UL (ref 4–5.4)
SATURATED IRON: 20 % (ref 20–50)
SODIUM SERPL-SCNC: 144 MMOL/L (ref 136–145)
TOTAL IRON BINDING CAPACITY: 410 UG/DL (ref 250–450)
TRANSFERRIN SERPL-MCNC: 277 MG/DL (ref 200–375)
WBC # BLD AUTO: 8.85 K/UL (ref 3.9–12.7)

## 2024-08-13 PROCEDURE — 82728 ASSAY OF FERRITIN: CPT | Performed by: NURSE PRACTITIONER

## 2024-08-13 PROCEDURE — 85027 COMPLETE CBC AUTOMATED: CPT | Performed by: NURSE PRACTITIONER

## 2024-08-13 PROCEDURE — 80053 COMPREHEN METABOLIC PANEL: CPT | Performed by: NURSE PRACTITIONER

## 2024-08-13 PROCEDURE — 36415 COLL VENOUS BLD VENIPUNCTURE: CPT | Mod: PN | Performed by: NURSE PRACTITIONER

## 2024-08-13 PROCEDURE — 83540 ASSAY OF IRON: CPT | Performed by: NURSE PRACTITIONER

## 2024-08-28 DIAGNOSIS — F33.1 MODERATE EPISODE OF RECURRENT MAJOR DEPRESSIVE DISORDER: ICD-10-CM

## 2024-08-28 RX ORDER — DULOXETIN HYDROCHLORIDE 60 MG/1
60 CAPSULE, DELAYED RELEASE ORAL
Qty: 90 CAPSULE | Refills: 3 | Status: SHIPPED | OUTPATIENT
Start: 2024-08-28

## 2024-08-28 NOTE — TELEPHONE ENCOUNTER
No care due was identified.  [facility] Embedded Care Due Messages. Reference number: [id number].   8/28/2024 8:02:09 AM CDT

## 2024-08-28 NOTE — TELEPHONE ENCOUNTER
Refill Routing Note   Medication(s) are not appropriate for processing by [facility] for the following reason(s):        ED/Hospital Visit since last OV with provider    [facility] action(s):  Defer             Extended chart review required: Yes     Appointments  past 12m or future 3m with PCP    Date Provider   Last Visit   5/8/2024 Arnoldo Miller MD   Next Visit   Visit date not found Arnoldo Miller MD   ED visits in past 90 days: 1        Note composed:1:31 PM 08/28/2024

## 2024-09-06 ENCOUNTER — TELEPHONE (OUTPATIENT)
Dept: RADIATION ONCOLOGY | Facility: CLINIC | Age: 74
End: 2024-09-06
Payer: MEDICARE

## 2024-09-06 NOTE — TELEPHONE ENCOUNTER
----- Message from Alejandra Keyes sent at 9/5/2024  4:51 PM CDT -----  Type: Needs Medical Advice  Who Called: Cori(spouse)  Symptoms (please be specific):    How long has patient had these symptoms:    Pharmacy name and phone #:    Best Call Back Number: [phone]  Additional Information: Cori is requesting a call back from the nurse regarding her .

## 2024-09-23 DIAGNOSIS — D50.9 IRON DEFICIENCY ANEMIA, UNSPECIFIED IRON DEFICIENCY ANEMIA TYPE: ICD-10-CM

## 2024-09-23 RX ORDER — FERROUS GLUCONATE 324(38)MG
324 TABLET ORAL
Qty: 30 TABLET | Refills: 1 | Status: SHIPPED | OUTPATIENT
Start: 2024-09-23

## 2024-09-23 NOTE — TELEPHONE ENCOUNTER
Please approve for ferrous gluconate 324 mg (38 mg iron) tablet     Last OV 08/01/24  Next appt --

## 2024-09-23 NOTE — TELEPHONE ENCOUNTER
Refill Routing Note   Medication(s) are not appropriate for processing by [facility] for the following reason(s):        Non-participating provider  Responsible provider unclear    [facility] action(s):  Route             Appointments  past 12m or future 3m with PCP    Date Provider   Last Visit   8/1/2024 Ruthie Pond FNP   Next Visit   Visit date not found Ruthie Pond FNP   ED visits in past 90 days: 1        Note composed:12:26 PM 09/23/2024

## 2024-10-03 RX ORDER — POTASSIUM CHLORIDE 750 MG/1
TABLET, EXTENDED RELEASE ORAL
Qty: 45 TABLET | Refills: 2 | Status: SHIPPED | OUTPATIENT
Start: 2024-10-03

## 2024-10-03 NOTE — TELEPHONE ENCOUNTER
No care due was identified.  [facility] Embedded Care Due Messages. Reference number: [id number].   10/03/2024 8:04:34 AM CDT

## 2024-10-03 NOTE — TELEPHONE ENCOUNTER
Refill Decision Note   Cori Robbie  is requesting a refill authorization.  Brief Assessment and Rationale for Refill:  Approve     Medication Therapy Plan:        Comments:     Note composed:3:12 PM 10/03/2024

## 2024-11-25 RX ORDER — DULOXETIN HYDROCHLORIDE 30 MG/1
30 CAPSULE, DELAYED RELEASE ORAL
Qty: 90 CAPSULE | Refills: 2 | Status: SHIPPED | OUTPATIENT
Start: 2024-11-25

## 2024-11-25 NOTE — TELEPHONE ENCOUNTER
Refill Routing Note   Medication(s) are not appropriate for processing by [facility] for the following reason(s):        ED/Hospital Visit since last OV with provider    [facility] action(s):  Defer               Appointments  past 12m or future 3m with PCP    Date Provider   Last Visit   5/8/2024 Arnoldo Miller MD   Next Visit   Visit date not found Arnoldo Miller MD   ED visits in past 90 days: 0        Note composed:10:24 AM 11/25/2024

## 2024-11-25 NOTE — TELEPHONE ENCOUNTER
No care due was identified.  Health [facility] Embedded Care Due Messages. Reference number: [id number].   11/25/2024 8:05:15 AM CST

## 2024-12-04 ENCOUNTER — TELEPHONE (OUTPATIENT)
Dept: UROLOGY | Facility: CLINIC | Age: 74
End: 2024-12-04
Payer: MEDICARE

## 2024-12-04 NOTE — TELEPHONE ENCOUNTER
----- Message from Frances sent at 12/4/2024  1:25 PM CST -----  Contact: self  Type: Needs Medical Advice  Who Called:  pt   Symptoms (please be specific):  bladder leakage    Best Call Back Number: [phone]    Additional Information: pt states that she is have  bladder leakage please call

## 2024-12-04 NOTE — TELEPHONE ENCOUNTER
Patient was called and appt was scheduled, patient called PCP and GYN and explained issue, both advised her to see Urology as soon as possible.     Breanna

## 2024-12-05 ENCOUNTER — OFFICE VISIT (OUTPATIENT)
Dept: UROLOGY | Facility: CLINIC | Age: 74
End: 2024-12-05
Payer: MEDICARE

## 2024-12-05 VITALS — HEIGHT: 63 IN | WEIGHT: 229.25 LBS | BODY MASS INDEX: 40.62 KG/M2

## 2024-12-05 DIAGNOSIS — N39.3 STRESS INCONTINENCE IN FEMALE: Primary | ICD-10-CM

## 2024-12-05 LAB
BILIRUBIN, UA POC OHS: NEGATIVE
BLOOD, UA POC OHS: NEGATIVE
CLARITY, UA POC OHS: CLEAR
COLOR, UA POC OHS: YELLOW
GLUCOSE, UA POC OHS: NEGATIVE
KETONES, UA POC OHS: NEGATIVE
LEUKOCYTES, UA POC OHS: NEGATIVE
NITRITE, UA POC OHS: NEGATIVE
PH, UA POC OHS: 6.5
POC RESIDUAL URINE VOLUME: 0 ML (ref 0–100)
PROTEIN, UA POC OHS: NEGATIVE
SPECIFIC GRAVITY, UA POC OHS: 1.01
UROBILINOGEN, UA POC OHS: 2

## 2024-12-05 PROCEDURE — 87086 URINE CULTURE/COLONY COUNT: CPT

## 2024-12-05 PROCEDURE — 99999 PR PBB SHADOW E&M-EST. PATIENT-LVL IV: CPT | Mod: PBBFAC,,,

## 2024-12-05 NOTE — PROGRESS NOTES
[facility] Note  Staff: Paige Muhammad FNP-C    PCP: MD Paul    Chief Complaint: Stress Incontinence    Subjective:        HPI: Cori Avalos is a 74 y.o. female new patient to me presents today for evaluation of stress incontinence. She is accompanied by her . She is established to our office and has been seen in the past by Dr. Del Valle. She states yesterday she was experiencing an increase in stress incontinence. She states she was not feeling urgency. She states with movement such as standing from sitting, she was leaking urine. She states she has always had some leakage of urine and wears pads, however, she states yesterday she had to change clothes twice. She states she has not had the same symptoms today. She states she was concerned she was getting a UTI. She denies dysuria, hematuria, fever, flank pain, abd pain, and difficulty urinating.    We discussed treatment options for stress incontinence including at home pelvic floor exercises, PFPT, and procedural intervention. She states her GYN has recommended PFPT in the past but she did not wish to pursue. She states she will attempt at home exercises, such as kegel exercises first.     Questions asked pt during office visit today:  Urgency:No, incontinence with urgency? No;   Incontinence with laughing or straining: Yes ;   DysuriaNo  Gross HematuriaNo  History of UTI: Yes     History of Kidney Stones?:  no    Constipation issues?:  no    PVR by bladder scan performed by MA today:  0 mL    REVIEW OF SYSTEMS:  Review of Systems   Constitutional: Negative.  Negative for chills and fever.   Gastrointestinal: Negative.  Negative for abdominal pain, constipation, diarrhea, nausea and vomiting.   Genitourinary: Negative.  Negative for dysuria, flank pain, frequency, hematuria and urgency.   Musculoskeletal: Negative.  Negative for back pain.   Neurological:  Positive for weakness.       PMHx:  Past Medical History:   Diagnosis  Date    AC (acromioclavicular) joint bone spurs     Acid reflux 04/14/2011    Adrenal insufficiency     Anticoagulant long-term use     Biceps muscle tear     Right    Bilateral lower extremity edema     Depression     Encounter for blood transfusion 1978    Factor V Leiden     Falls frequently     General anesthetics causing adverse effect in therapeutic use     at [facility]; incapacitated for 3 wks with hallucinations and delerium following sgy    History of supraventricular tachycardia     Hypertension     IBS (irritable bowel syndrome)     Immunocompromised due to corticosteroids 04/12/2016    no longer on steroids    Long term current use of anticoagulant     Meralgia paresthetica of left side     Osteoarthritis     Osteoporosis     Restless leg syndrome     Rotator cuff injury     Right    S/P ablation operation for arrhythmia     Sleep apnea     Bipap       PSHx:  Past Surgical History:   Procedure Laterality Date    APPENDECTOMY      BACK SURGERY  2013 and 2018    CARDIAC ELECTROPHYSIOLOGY STUDY AND ABLATION      CARPAL TUNNEL RELEASE Right     CHOLECYSTECTOMY      COLECTOMY      ECTOPIC PREGNANCY SURGERY      EYE SURGERY Bilateral     cataracts with iol    HERNIA REPAIR      Umbilical    INCISION AND DRAINAGE N/A 5/16/2023    Procedure: INCISION AND DRAINAGE;  Surgeon: Douglas Chen MD;  Location: [facility];  Service: ENT;  Laterality: N/A;  of mucocele     INSERTION OF IMPLANTABLE LOOP RECORDER N/A 04/27/2021    Procedure: INSERTION, IMPLANTABLE LOOP RECORDER;  Surgeon: Melo Colin MD;  [facility];  Service: Cardiology;  Laterality: N/A;    INSERTION OF STENT INTO PERIPHERAL VESSEL  6/18/2024    Procedure: Venous Angioplasty;  Surgeon: Melo Colin MD;  Location: [facility] CATH;  Service: Cardiology;;    JOINT REPLACEMENT Left     Knee    MAMMO BREAST STEREOTACTIC BREAST BIOPSY LEFT Left 05/09/2022    benign    PHLEBOGRAPHY  6/18/2024    Procedure: Rt. Arm Venogram;  Surgeon: Melo Colin MD;   Location: [facility];  Service: Cardiology;;    SHOULDER SURGERY Right     Dr Guevara     sinus cleaning      TONSILLECTOMY      TUBAL LIGATION      uterine suspension      VEIN LIGATION AND STRIPPING Bilateral     VENOGRAM  7/10/2024       Fam Hx:   malignancies: No , gyn malignancies: Yes - mother breast cancer   kidney stones: No     Soc Hx:  , lives in [place]    Allergies:  Cyclobenzaprine, Gabapentin, Levaquin [levofloxacin], Opioids - morphine analogues, Penicillins, Opioids-meperidine and related, Oxycodone-acetaminophen, and Ceftin [cefuroxime axetil]    Medications: reviewed     Objective:   There were no vitals filed for this visit.    Physical Exam  Constitutional:       Appearance: Normal appearance.   Pulmonary:      Effort: Pulmonary effort is normal.   Abdominal:      General: There is no distension.      Palpations: Abdomen is soft.      Tenderness: There is no abdominal tenderness.   Musculoskeletal:      Cervical back: Normal range of motion.      Comments: Uses a walker   Skin:     General: Skin is warm.   Neurological:      Mental Status: She is oriented to person, place, and time.   Psychiatric:         Mood and Affect: Mood normal.         Behavior: Behavior normal.         LABS REVIEW:  UA today:   Color:Clear, Yellow  Spec. Grav.  1.015  PH  6.5  Negative for leukocytes, nitrates, protein, glucose, ketones, bili, and blood.  Positive urobili    Assessment:       1. Stress incontinence in female          Plan:      Urine sent for culture  Recommend kegel exercises, may benefit from referral to PFPT    F/u as needed    [facility]: active    SIVA Morales

## 2024-12-06 LAB
BACTERIA UR CULT: NORMAL
BACTERIA UR CULT: NORMAL

## 2024-12-09 ENCOUNTER — TELEPHONE (OUTPATIENT)
Dept: UROLOGY | Facility: CLINIC | Age: 74
End: 2024-12-09
Payer: MEDICARE

## 2024-12-09 NOTE — TELEPHONE ENCOUNTER
Called pt to notify of results, pt verbalized understanding.    ----- Message from Paige Muhammad NP sent at 12/9/2024  8:33 AM CST -----  Urine shows no bacteria or urine

## 2024-12-11 DIAGNOSIS — D50.9 IRON DEFICIENCY ANEMIA, UNSPECIFIED IRON DEFICIENCY ANEMIA TYPE: ICD-10-CM

## 2024-12-11 RX ORDER — FERROUS GLUCONATE 324(38)MG
324 TABLET ORAL
Qty: 30 TABLET | Refills: 1 | Status: SHIPPED | OUTPATIENT
Start: 2024-12-11

## 2024-12-26 DIAGNOSIS — R09.81 NASAL CONGESTION: ICD-10-CM

## 2024-12-26 RX ORDER — FLUNISOLIDE 0.25 MG/ML
SOLUTION NASAL
Qty: 25 ML | Refills: 1 | Status: SHIPPED | OUTPATIENT
Start: 2024-12-26

## 2024-12-26 NOTE — TELEPHONE ENCOUNTER
No care due was identified.  [facility] Embedded Care Due Messages. Reference number: [id number].   12/26/2024 9:32:47 AM CST

## 2024-12-26 NOTE — TELEPHONE ENCOUNTER
Refill Routing Note   Medication(s) are not appropriate for processing by [facility] for the following reason(s):        ED/Hospital Visit since last OV with provider    [facility] action(s):  Defer               Appointments  past 12m or future 3m with PCP    Date Provider   Last Visit   5/8/2024 Arnoldo Miller MD   Next Visit   Visit date not found Arnoldo Miller MD   ED visits in past 90 days: 0        Note composed:4:23 PM 12/26/2024

## 2025-01-03 ENCOUNTER — TELEPHONE (OUTPATIENT)
Dept: FAMILY MEDICINE | Facility: CLINIC | Age: 75
End: 2025-01-03
Payer: MEDICARE

## 2025-01-03 NOTE — TELEPHONE ENCOUNTER
----- Message from Shani sent at 1/3/2025  7:57 AM CST -----  Contact: pt  Type: Needs Medical Advice         Who Called: PT  Best Call Back Number: [phone]  Additional Information: Requesting a call back regarding Pt asking for same day appt. Pt feels she has an upper respiratory infection and sinus infection. Pt has had for past week and its getting worse. Pt is also requesting for an appt for ( freya gomes sent )  Please Advise- Thank you

## 2025-02-23 DIAGNOSIS — J30.89 SEASONAL ALLERGIC RHINITIS DUE TO OTHER ALLERGIC TRIGGER: ICD-10-CM

## 2025-02-23 NOTE — TELEPHONE ENCOUNTER
No care due was identified.  Health [facility] Embedded Care Due Messages. Reference number: [id number].   2/23/2025 8:01:06 AM CST

## 2025-02-24 RX ORDER — FLUNISOLIDE 0.25 MG/ML
SOLUTION NASAL
Qty: 25 ML | Refills: 1 | Status: SHIPPED | OUTPATIENT
Start: 2025-02-24

## 2025-02-24 NOTE — TELEPHONE ENCOUNTER
Refill Routing Note   Medication(s) are not appropriate for processing by [facility] for the following reason(s):        ED/Hospital Visit since last OV with provider    [facility] action(s):  Defer      Medication Therapy Plan: 2/19/25 ED VIST - Wheezing / SOB    Extended chart review required: Yes     Appointments  past 12m or future 3m with PCP    Date Provider   Last Visit   5/8/2024 Arnoldo Miller MD   Next Visit   Visit date not found Arnoldo Miller MD   ED visits in past 90 days: 1        Note composed:9:27 AM 02/24/2025

## 2025-03-03 RX ORDER — PANTOPRAZOLE SODIUM 40 MG/1
TABLET, DELAYED RELEASE ORAL
Qty: 90 TABLET | Refills: 3 | Status: SHIPPED | OUTPATIENT
Start: 2025-03-03

## 2025-03-03 NOTE — TELEPHONE ENCOUNTER
No care due was identified.  [facility] Embedded Care Due Messages. Reference number: [id number].   3/03/2025 8:05:19 AM CST

## 2025-03-03 NOTE — TELEPHONE ENCOUNTER
Dr. López's Patient    Reached out to patient to assist with scheduling an appointment for med refills. Patient stated her and her  are seeking care elsewhere due to not being able to get in. Please refuse medication request if appropriate.

## 2025-03-12 DIAGNOSIS — Z78.0 MENOPAUSE: ICD-10-CM

## 2025-03-12 PROBLEM — I48.0 PAROXYSMAL ATRIAL FIBRILLATION: Status: RESOLVED | Noted: 2021-08-20 | Resolved: 2025-03-12

## 2025-03-12 PROBLEM — D64.9 ANEMIA: Status: RESOLVED | Noted: 2017-01-19 | Resolved: 2025-03-12

## 2025-03-12 PROBLEM — S22.000S COMPRESSION FX, THORACIC SPINE, SEQUELA: Status: RESOLVED | Noted: 2019-03-05 | Resolved: 2025-03-12

## 2025-03-12 PROBLEM — M75.101 ROTATOR CUFF TEAR ARTHROPATHY OF RIGHT SHOULDER: Status: RESOLVED | Noted: 2020-07-27 | Resolved: 2025-03-12

## 2025-03-12 PROBLEM — Z79.899 HIGH RISK MEDICATION USE: Status: RESOLVED | Noted: 2017-03-29 | Resolved: 2025-03-12

## 2025-03-12 PROBLEM — G47.00 INSOMNIA: Status: RESOLVED | Noted: 2022-12-05 | Resolved: 2025-03-12

## 2025-03-12 PROBLEM — M17.0 OSTEOARTHRITIS OF BOTH KNEES: Status: RESOLVED | Noted: 2018-08-20 | Resolved: 2025-03-12

## 2025-03-12 PROBLEM — K75.81 NASH (NONALCOHOLIC STEATOHEPATITIS): Status: RESOLVED | Noted: 2023-05-25 | Resolved: 2025-03-12

## 2025-03-12 PROBLEM — J30.89 ENVIRONMENTAL AND SEASONAL ALLERGIES: Status: RESOLVED | Noted: 2021-08-24 | Resolved: 2025-03-12

## 2025-03-12 PROBLEM — G89.4 CHRONIC PAIN DISORDER: Status: RESOLVED | Noted: 2022-02-07 | Resolved: 2025-03-12

## 2025-03-12 PROBLEM — R41.3 MEMORY LOSS: Status: RESOLVED | Noted: 2022-08-25 | Resolved: 2025-03-12

## 2025-03-12 PROBLEM — E66.01 MORBID OBESITY WITH BMI OF 40.0-44.9, ADULT: Status: ACTIVE | Noted: 2025-03-12

## 2025-03-12 PROBLEM — M12.811 ROTATOR CUFF TEAR ARTHROPATHY OF RIGHT SHOULDER: Status: RESOLVED | Noted: 2020-07-27 | Resolved: 2025-03-12

## 2025-03-12 PROBLEM — F33.1 MODERATE EPISODE OF RECURRENT MAJOR DEPRESSIVE DISORDER: Status: RESOLVED | Noted: 2023-05-25 | Resolved: 2025-03-12

## 2025-03-12 PROBLEM — M54.50 CHRONIC BILATERAL LOW BACK PAIN WITHOUT SCIATICA: Status: RESOLVED | Noted: 2019-03-05 | Resolved: 2025-03-12

## 2025-03-12 PROBLEM — G89.29 CHRONIC BILATERAL LOW BACK PAIN WITHOUT SCIATICA: Status: RESOLVED | Noted: 2019-03-05 | Resolved: 2025-03-12

## 2025-03-12 PROBLEM — R55 SYNCOPE AND COLLAPSE: Status: RESOLVED | Noted: 2021-04-27 | Resolved: 2025-03-12

## 2025-03-12 PROBLEM — G25.81 RLS (RESTLESS LEGS SYNDROME): Status: RESOLVED | Noted: 2021-08-24 | Resolved: 2025-03-12

## 2025-03-19 DIAGNOSIS — Z78.0 MENOPAUSE: ICD-10-CM

## 2025-03-27 PROBLEM — E66.2 CLASS 3 OBESITY WITH ALVEOLAR HYPOVENTILATION, SERIOUS COMORBIDITY, AND BODY MASS INDEX (BMI) OF 40.0 TO 44.9 IN ADULT: Status: RESOLVED | Noted: 2024-08-01 | Resolved: 2025-03-27

## 2025-03-27 PROBLEM — E66.812 CLASS 2 OBESITY DUE TO EXCESS CALORIES WITHOUT SERIOUS COMORBIDITY IN ADULT: Status: ACTIVE | Noted: 2025-03-27

## 2025-03-27 PROBLEM — E66.813 CLASS 3 OBESITY WITH ALVEOLAR HYPOVENTILATION, SERIOUS COMORBIDITY, AND BODY MASS INDEX (BMI) OF 40.0 TO 44.9 IN ADULT: Status: RESOLVED | Noted: 2024-08-01 | Resolved: 2025-03-27

## 2025-03-27 PROBLEM — E66.09 CLASS 2 OBESITY DUE TO EXCESS CALORIES WITHOUT SERIOUS COMORBIDITY IN ADULT: Status: ACTIVE | Noted: 2025-03-27

## 2025-03-27 PROBLEM — E66.01 MORBID OBESITY WITH BMI OF 40.0-44.9, ADULT: Status: RESOLVED | Noted: 2025-03-12 | Resolved: 2025-03-27

## 2025-04-06 DIAGNOSIS — D50.9 IRON DEFICIENCY ANEMIA, UNSPECIFIED IRON DEFICIENCY ANEMIA TYPE: ICD-10-CM

## 2025-04-07 RX ORDER — FERROUS GLUCONATE 324(38)MG
324 TABLET ORAL
Qty: 30 TABLET | Refills: 1 | OUTPATIENT
Start: 2025-04-07

## 2025-04-07 NOTE — TELEPHONE ENCOUNTER
Refill Routing Note   Medication(s) are not appropriate for processing by [facility] for the following reason(s):        Non-participating provider    [facility] action(s):  Route               Appointments  past 12m or future 3m with PCP    Date Provider   Last Visit   8/1/2024 Ruthie Pond FNP   Next Visit   Visit date not found Ruthie Pond FNP   ED visits in past 90 days: 1        Note composed:11:58 AM 04/07/2025

## 2025-04-09 ENCOUNTER — TELEPHONE (OUTPATIENT)
Dept: HEMATOLOGY/ONCOLOGY | Facility: CLINIC | Age: 75
End: 2025-04-09
Payer: MEDICARE

## 2025-05-08 ENCOUNTER — OFFICE VISIT (OUTPATIENT)
Dept: HEMATOLOGY/ONCOLOGY | Facility: CLINIC | Age: 75
End: 2025-05-08
Payer: MEDICARE

## 2025-05-08 ENCOUNTER — LAB VISIT (OUTPATIENT)
Dept: LAB | Facility: HOSPITAL | Age: 75
End: 2025-05-08
Attending: INTERNAL MEDICINE
Payer: MEDICARE

## 2025-05-08 ENCOUNTER — TELEPHONE (OUTPATIENT)
Dept: HEMATOLOGY/ONCOLOGY | Facility: CLINIC | Age: 75
End: 2025-05-08
Payer: MEDICARE

## 2025-05-08 VITALS
BODY MASS INDEX: 39.48 KG/M2 | DIASTOLIC BLOOD PRESSURE: 80 MMHG | TEMPERATURE: 98 F | HEART RATE: 52 BPM | OXYGEN SATURATION: 100 % | SYSTOLIC BLOOD PRESSURE: 128 MMHG | WEIGHT: 222.88 LBS

## 2025-05-08 DIAGNOSIS — D68.51 FACTOR V LEIDEN MUTATION: ICD-10-CM

## 2025-05-08 DIAGNOSIS — D68.51 FACTOR V LEIDEN MUTATION: Primary | ICD-10-CM

## 2025-05-08 DIAGNOSIS — Z79.01 CHRONIC ANTICOAGULATION: ICD-10-CM

## 2025-05-08 DIAGNOSIS — D69.6 THROMBOCYTOPENIA, UNSPECIFIED: ICD-10-CM

## 2025-05-08 DIAGNOSIS — D50.9 IRON DEFICIENCY ANEMIA, UNSPECIFIED IRON DEFICIENCY ANEMIA TYPE: ICD-10-CM

## 2025-05-08 LAB
ABSOLUTE EOSINOPHIL (OHS): 0.19 K/UL
ABSOLUTE MONOCYTE (OHS): 0.44 K/UL (ref 0.3–1)
ABSOLUTE NEUTROPHIL COUNT (OHS): 4.23 K/UL (ref 1.8–7.7)
ALBUMIN SERPL BCP-MCNC: 3.9 G/DL (ref 3.5–5.2)
ALP SERPL-CCNC: 106 UNIT/L (ref 40–150)
ALT SERPL W/O P-5'-P-CCNC: 12 UNIT/L (ref 10–44)
ANION GAP (OHS): 10 MMOL/L (ref 8–16)
AST SERPL-CCNC: 18 UNIT/L (ref 11–45)
BACTERIA #/AREA URNS HPF: NORMAL /HPF
BASOPHILS # BLD AUTO: 0.02 K/UL
BASOPHILS NFR BLD AUTO: 0.3 %
BILIRUB SERPL-MCNC: 0.7 MG/DL (ref 0.1–1)
BILIRUB UR QL STRIP.AUTO: NEGATIVE
BUN SERPL-MCNC: 24 MG/DL (ref 8–23)
CALCIUM SERPL-MCNC: 9.2 MG/DL (ref 8.7–10.5)
CHLORIDE SERPL-SCNC: 107 MMOL/L (ref 95–110)
CLARITY UR: CLEAR
CO2 SERPL-SCNC: 24 MMOL/L (ref 23–29)
COLOR UR AUTO: YELLOW
CREAT SERPL-MCNC: 0.9 MG/DL (ref 0.5–1.4)
DAT IGG-SP REAG RBC-IMP: NORMAL
ERYTHROCYTE [DISTWIDTH] IN BLOOD BY AUTOMATED COUNT: 12.8 % (ref 11.5–14.5)
FOLATE SERPL-MCNC: 11.6 NG/ML (ref 4–24)
GFR SERPLBLD CREATININE-BSD FMLA CKD-EPI: >60 ML/MIN/1.73/M2
GLUCOSE SERPL-MCNC: 81 MG/DL (ref 70–110)
GLUCOSE UR QL STRIP: NEGATIVE
HCT VFR BLD AUTO: 33.1 % (ref 37–48.5)
HGB BLD-MCNC: 11.5 GM/DL (ref 12–16)
HGB UR QL STRIP: NEGATIVE
IMM GRANULOCYTES # BLD AUTO: 0.02 K/UL (ref 0–0.04)
IMM GRANULOCYTES NFR BLD AUTO: 0.3 % (ref 0–0.5)
KETONES UR QL STRIP: NEGATIVE
LDH SERPL-CCNC: 235 U/L (ref 110–260)
LEUKOCYTE ESTERASE UR QL STRIP: ABNORMAL
LYMPHOCYTES # BLD AUTO: 1.38 K/UL (ref 1–4.8)
MCH RBC QN AUTO: 33.3 PG (ref 27–31)
MCHC RBC AUTO-ENTMCNC: 34.7 G/DL (ref 32–36)
MCV RBC AUTO: 96 FL (ref 82–98)
MICROSCOPIC COMMENT: NORMAL
NITRITE UR QL STRIP: NEGATIVE
NUCLEATED RBC (/100WBC) (OHS): 0 /100 WBC
PH UR STRIP: 6 [PH]
PLATELET # BLD AUTO: 156 K/UL (ref 150–450)
PMV BLD AUTO: 9.1 FL (ref 9.2–12.9)
POTASSIUM SERPL-SCNC: 4.2 MMOL/L (ref 3.5–5.1)
PROT SERPL-MCNC: 7.6 GM/DL (ref 6–8.4)
PROT UR QL STRIP: NEGATIVE
RBC # BLD AUTO: 3.45 M/UL (ref 4–5.4)
RELATIVE EOSINOPHIL (OHS): 3 %
RELATIVE LYMPHOCYTE (OHS): 22 % (ref 18–48)
RELATIVE MONOCYTE (OHS): 7 % (ref 4–15)
RELATIVE NEUTROPHIL (OHS): 67.4 % (ref 38–73)
RETICS/RBC NFR AUTO: 3.1 % (ref 0.5–2.5)
SODIUM SERPL-SCNC: 141 MMOL/L (ref 136–145)
SP GR UR STRIP: 1.02
SQUAMOUS #/AREA URNS HPF: 3 /HPF
WBC # BLD AUTO: 6.28 K/UL (ref 3.9–12.7)
WBC #/AREA URNS HPF: 5 /HPF (ref 0–5)

## 2025-05-08 PROCEDURE — 82746 ASSAY OF FOLIC ACID SERUM: CPT

## 2025-05-08 PROCEDURE — 3288F FALL RISK ASSESSMENT DOCD: CPT | Mod: CPTII,S$GLB,, | Performed by: INTERNAL MEDICINE

## 2025-05-08 PROCEDURE — 83615 LACTATE (LD) (LDH) ENZYME: CPT | Mod: PN

## 2025-05-08 PROCEDURE — 99205 OFFICE O/P NEW HI 60 MIN: CPT | Mod: S$GLB,,, | Performed by: INTERNAL MEDICINE

## 2025-05-08 PROCEDURE — 1157F ADVNC CARE PLAN IN RCRD: CPT | Mod: CPTII,S$GLB,, | Performed by: INTERNAL MEDICINE

## 2025-05-08 PROCEDURE — 1159F MED LIST DOCD IN RCRD: CPT | Mod: CPTII,S$GLB,, | Performed by: INTERNAL MEDICINE

## 2025-05-08 PROCEDURE — 84165 PROTEIN E-PHORESIS SERUM: CPT

## 2025-05-08 PROCEDURE — 85045 AUTOMATED RETICULOCYTE COUNT: CPT | Mod: PN

## 2025-05-08 PROCEDURE — 3074F SYST BP LT 130 MM HG: CPT | Mod: CPTII,S$GLB,, | Performed by: INTERNAL MEDICINE

## 2025-05-08 PROCEDURE — 85025 COMPLETE CBC W/AUTO DIFF WBC: CPT | Mod: PN

## 2025-05-08 PROCEDURE — 36415 COLL VENOUS BLD VENIPUNCTURE: CPT | Mod: PN | Performed by: INTERNAL MEDICINE

## 2025-05-08 PROCEDURE — 99999 PR PBB SHADOW E&M-EST. PATIENT-LVL IV: CPT | Mod: PBBFAC,,, | Performed by: INTERNAL MEDICINE

## 2025-05-08 PROCEDURE — 1125F AMNT PAIN NOTED PAIN PRSNT: CPT | Mod: CPTII,S$GLB,, | Performed by: INTERNAL MEDICINE

## 2025-05-08 PROCEDURE — 36415 COLL VENOUS BLD VENIPUNCTURE: CPT | Mod: PN

## 2025-05-08 PROCEDURE — G2211 COMPLEX E/M VISIT ADD ON: HCPCS | Mod: S$GLB,,, | Performed by: INTERNAL MEDICINE

## 2025-05-08 PROCEDURE — 86880 COOMBS TEST DIRECT: CPT | Mod: PN | Performed by: INTERNAL MEDICINE

## 2025-05-08 PROCEDURE — 81003 URINALYSIS AUTO W/O SCOPE: CPT | Mod: PN

## 2025-05-08 PROCEDURE — 86880 COOMBS TEST DIRECT: CPT | Performed by: INTERNAL MEDICINE

## 2025-05-08 PROCEDURE — 3079F DIAST BP 80-89 MM HG: CPT | Mod: CPTII,S$GLB,, | Performed by: INTERNAL MEDICINE

## 2025-05-08 PROCEDURE — 1100F PTFALLS ASSESS-DOCD GE2>/YR: CPT | Mod: CPTII,S$GLB,, | Performed by: INTERNAL MEDICINE

## 2025-05-08 PROCEDURE — 80053 COMPREHEN METABOLIC PANEL: CPT | Mod: PN

## 2025-05-08 RX ORDER — VIT C/E/ZN/COPPR/LUTEIN/ZEAXAN 250MG-90MG
500 CAPSULE ORAL DAILY
COMMUNITY

## 2025-05-08 RX ORDER — BUDESONIDE AND FORMOTEROL FUMARATE DIHYDRATE 160; 4.5 UG/1; UG/1
2 AEROSOL RESPIRATORY (INHALATION)
COMMUNITY

## 2025-05-08 RX ORDER — IBUPROFEN 100 MG/5ML
1000 SUSPENSION, ORAL (FINAL DOSE FORM) ORAL DAILY
COMMUNITY

## 2025-05-08 RX ORDER — NYSTATIN AND TRIAMCINOLONE ACETONIDE 100000; 1 [USP'U]/G; MG/G
CREAM TOPICAL
COMMUNITY

## 2025-05-08 NOTE — TELEPHONE ENCOUNTER
Called and spoke with patient to inform her that there were a cancellation on schedule for 1PM, would she like to be rescheduled for today?  Patient agreed to be scheduled for appt.

## 2025-05-08 NOTE — PROGRESS NOTES
Subjective     Patient ID: Cori Avalos is a 75 y.o. female.    Chief Complaint: No chief complaint on file.    HPI  Mrs. Simmons is a 75-year-old female referred for thrombocytopenia.  The CBC that triggered the referral was from April 7, 2025 and it was as follows:  WBCs 6000 per cubic mm, hemoglobin 11.4 grams/dL, hematocrit 33.4%, MCV 99 and platelets 145 K.  On the same day her ferritin was 101 ng per mL while the B12 level was 158 pg per ml.  However, a subsequent CBC from earlier this week showed a white count of 5,900 per cubic mm, hemoglobin 10.9 grams/dL, hematocrit 33.2%, MCV 98, and platelets 156 K.    A CBC on 08/13/2024 had shown a white count of 8,800 per cubic mm, hemoglobin 13 grams/dL, hematocrit 39.5%, MCV 99 and platelets 223 K.  A CBC on 02/19/2024 had shown a white count of 3,800 per cubic mm, hemoglobin 11.3 grams/dL, hematocrit 32.5%, MCV 94 and platelets 145 K.  The 1st time mild anemia was noted was on a CBC dated 07/20/2020 that had shown a white count of 7,000 per cubic mm, hemoglobin 11 grams/dL, hematocrit 33.7%, MCV 99 and platelets 214K.  All CBCs prior to 2020 were essentially normal.  She is referred for evaluation.      PAST MEDICAL HISTORY:  She has a history of sleep apnea and hypertension.  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  She is known to have factor 5 Leiden mutation and she had been recommended lifelong anticoagulation.  She is currently on Eliquis 2.5 mg b.i.d..  PAST SURGICAL HISTORY:  She has had multiple surgeries including tonsillectomy, appendectomy, uterine surgery for prolapse, a ruptured ectopic pregnancy, 2 tubal ligation, cholecystectomy, resection of part of her colon, resection of a uterine polyp, D and C, right carpal tunnel, left knee replacement surgery, and right shoulder surgery.  She also has had ablation of superficial veins on her legs.  SOCIAL HISTORY:  She used to work as a .  She is .  She does  not smoke and does not drink alcohol.  FAMILY HISTORY:  Mother had breast and lung cancer.  A brother had prostate cancer.          Review of Systems  Overall she feels well.  She complains primarily of fatigue which she attributes to her sleep apnea.  She also complains of shortness of breath with exertion and she uses O2 on a p.r.n. basis.  In addition, she complains of pain in the right shoulder.  When asked, she denies any epistaxis, blood in the stool, vaginal bleeding, or blood in the urine.  Furthermore, she denies any anxiety, depression, fevers, chills, night  sweats, weight loss, nausea, vomiting, diarrhea, constipation, diplopia, blurred vision, headache, chest pain, palpitations, shortness of breath, breast pain, abdominal pain, extremity pain, or difficulty ambulating.  The remainder of the ten-point ROS, including general, skin, lymph, H/N, cardiorespiratory, GI, , Neuro, Endocrine, and psychiatric is negative.   Physical Exam       She is alert, oriented to time, place, person, pleasant, well      nourished, in no acute physical distress.  She is here with her daughter who is a , and her                                   VITAL SIGNS:  Reviewed.  BMI of 39 noted                                      HEENT:  Normal.  There are no nasal, oral, lip, gingival, auricular, lid,    or conjunctival lesions.  Mucosae are moist and pink, and there is no        thrush.  Pupils are equal, reactive to light and accommodation.              Extraocular muscle movements are intact.  Dentition is good.  There is no frontal or maxillary tenderness.                                     NECK:  Supple without JVD, adenopathy, or thyromegaly.                       LUNGS:  Clear to auscultation without wheezing, rales, or rhonchi.           CARDIOVASCULAR:  Reveals an S1, S2, no murmurs, no rubs, no gallops.         ABDOMEN:  Soft, nontender, without organomegaly.  Bowel sounds are    present.                                                                      EXTREMITIES:  No cyanosis, clubbing, with 1+ edema below knees bilaterally.   She is using compression stockings.  A scar from left knee replacement surgery is noted                             BREASTS:  Deferred                                     LYMPHATIC:  There is no cervical, axillary, or supraclavicular adenopathy.   SKIN:  Warm and moist, without petechiae, rashes, induration, but would multiple ecchymoses on both forearms from being on Eliquis.           NEUROLOGIC:  DTRs are 0-1+ bilaterally, symmetrical, motor function is 5/5,  and cranial nerves are  within normal limits.      ASSESSMENT  Mild thrombocytopenia of no clinical significance, resolved.  Obesity, BMI 39  Chronic anemia was slightly elevated MCV.  History of multiple DVTs of the factor 5 Leiden mutations, patient is on chronic Eliquis         PLAN  I had a very long discussion with Mrs. Avalos, her daughter, and her .  In regards to the transient thrombocytopenia, I explained to her that it is of no clinical significance and I am not inclined to recommend further workup  We went over the differential for her anemia.  At this point we will proceed as follows  We will repeat a CBC and CMP today  We will rule out hemolysis  There is no need to repeat her TFTs, ferritin B12 level as those were done last month by her primary physician that there were within normal range  She will submit a urine sample to ascertain that she does not have hematuria  She will submit 3 stool samples  We will obtain an SPEP  Finally, I will meet with her in 10-14 days to discuss the results.    Her multiple questions were answered to her satisfaction.  I spent approximately 60 minutes reviewing the available records and evaluating the patient, out of which over 50% of the time was spent face to face with the patient in counseling and coordinating this patient's care.  Visit today included increased complexity  associated with the diagnostic workup for anemia and thrombocytopenia in a 75-year-old patient with history of factor 5 Leiden mutation, on chronic Eliquis.    Route Chart for Scheduling    Med Onc Chart Routing      Follow up with physician 2 weeks. labs today.  See me in two weeks   Follow up with DANIELLE    Infusion scheduling note    Injection scheduling note    Labs    Imaging    Pharmacy appointment    Other referrals                Therapy Plan Information  DENOSUMAB (PROLIA) Q6M for Age-related osteoporosis without current pathological fracture, noted on 8/28/2013  DENOSUMAB (PROLIA) Q6M for Senile osteoporosis, noted on 3/12/2025  Medications  denosumab (PROLIA) injection 60 mg  60 mg, Subcutaneous, Every 26 weeks      No therapy plan of the specified type found.    No therapy plan of the specified type found.

## 2025-05-09 LAB
ALBUMIN, SPE (OHS): 3.87 G/DL (ref 3.35–5.55)
ALPHA 1 GLOB (OHS): 0.31 GM/DL (ref 0.17–0.41)
ALPHA 2 GLOB (OHS): 0.61 GM/DL (ref 0.43–0.99)
BETA GLOB (OHS): 1.01 GM/DL (ref 0.5–1.1)
GAMMA GLOBULIN (OHS): 1.01 GM/DL (ref 0.67–1.58)
PROT SERPL-MCNC: 6.8 GM/DL (ref 6–8.4)

## 2025-05-12 LAB — PATHOLOGIST REVIEW - SPE (OHS): NORMAL

## 2025-05-13 ENCOUNTER — LAB VISIT (OUTPATIENT)
Dept: LAB | Facility: HOSPITAL | Age: 75
End: 2025-05-13
Attending: INTERNAL MEDICINE
Payer: MEDICARE

## 2025-05-13 ENCOUNTER — PATIENT MESSAGE (OUTPATIENT)
Dept: RESEARCH | Facility: HOSPITAL | Age: 75
End: 2025-05-13
Payer: MEDICARE

## 2025-05-13 DIAGNOSIS — Z79.01 CHRONIC ANTICOAGULATION: ICD-10-CM

## 2025-05-13 DIAGNOSIS — D50.9 IRON DEFICIENCY ANEMIA, UNSPECIFIED IRON DEFICIENCY ANEMIA TYPE: ICD-10-CM

## 2025-05-13 DIAGNOSIS — D69.6 THROMBOCYTOPENIA, UNSPECIFIED: ICD-10-CM

## 2025-05-13 DIAGNOSIS — D68.51 FACTOR V LEIDEN MUTATION: ICD-10-CM

## 2025-05-13 LAB
OB PNL STL: NEGATIVE

## 2025-05-13 PROCEDURE — 82272 OCCULT BLD FECES 1-3 TESTS: CPT | Mod: 91,PN

## 2025-05-19 ENCOUNTER — OFFICE VISIT (OUTPATIENT)
Dept: HEMATOLOGY/ONCOLOGY | Facility: CLINIC | Age: 75
End: 2025-05-19
Payer: MEDICARE

## 2025-05-19 VITALS
SYSTOLIC BLOOD PRESSURE: 119 MMHG | DIASTOLIC BLOOD PRESSURE: 71 MMHG | HEART RATE: 73 BPM | WEIGHT: 220.44 LBS | TEMPERATURE: 98 F | OXYGEN SATURATION: 97 % | BODY MASS INDEX: 39.05 KG/M2

## 2025-05-19 DIAGNOSIS — D69.6 THROMBOCYTOPENIA, UNSPECIFIED: Primary | ICD-10-CM

## 2025-05-19 DIAGNOSIS — D50.8 OTHER IRON DEFICIENCY ANEMIA: ICD-10-CM

## 2025-05-19 DIAGNOSIS — R60.0 BILATERAL LOWER EXTREMITY EDEMA: ICD-10-CM

## 2025-05-19 DIAGNOSIS — D50.0 ANEMIA DUE TO CHRONIC BLOOD LOSS: ICD-10-CM

## 2025-05-19 DIAGNOSIS — G47.33 OSA (OBSTRUCTIVE SLEEP APNEA): ICD-10-CM

## 2025-05-19 PROCEDURE — 99999 PR PBB SHADOW E&M-EST. PATIENT-LVL IV: CPT | Mod: PBBFAC,,, | Performed by: INTERNAL MEDICINE

## 2025-05-19 PROCEDURE — 1126F AMNT PAIN NOTED NONE PRSNT: CPT | Mod: CPTII,S$GLB,, | Performed by: INTERNAL MEDICINE

## 2025-05-19 PROCEDURE — 1159F MED LIST DOCD IN RCRD: CPT | Mod: CPTII,S$GLB,, | Performed by: INTERNAL MEDICINE

## 2025-05-19 PROCEDURE — 3288F FALL RISK ASSESSMENT DOCD: CPT | Mod: CPTII,S$GLB,, | Performed by: INTERNAL MEDICINE

## 2025-05-19 PROCEDURE — G2211 COMPLEX E/M VISIT ADD ON: HCPCS | Mod: S$GLB,,, | Performed by: INTERNAL MEDICINE

## 2025-05-19 PROCEDURE — 3078F DIAST BP <80 MM HG: CPT | Mod: CPTII,S$GLB,, | Performed by: INTERNAL MEDICINE

## 2025-05-19 PROCEDURE — 1101F PT FALLS ASSESS-DOCD LE1/YR: CPT | Mod: CPTII,S$GLB,, | Performed by: INTERNAL MEDICINE

## 2025-05-19 PROCEDURE — 3074F SYST BP LT 130 MM HG: CPT | Mod: CPTII,S$GLB,, | Performed by: INTERNAL MEDICINE

## 2025-05-19 PROCEDURE — 99214 OFFICE O/P EST MOD 30 MIN: CPT | Mod: S$GLB,,, | Performed by: INTERNAL MEDICINE

## 2025-05-19 PROCEDURE — 1157F ADVNC CARE PLAN IN RCRD: CPT | Mod: CPTII,S$GLB,, | Performed by: INTERNAL MEDICINE

## 2025-05-19 NOTE — PROGRESS NOTES
"Subjective     Patient ID: Cori Avalos is a 75 y.o. female.    Chief Complaint: No chief complaint on file.    HPI  Mrs. Avalos returns today for follow-up.  Her initial visit was 2 weeks ago, on May 5, 2025.    Briefly, she  is a 75-year-old female referred for "thrombocytopenia and anemia".  Her workup earlier this month was as follows:  Urinalysis showed no hematuria  3 stool samples were negative for occult blood  Her SPEP did not show any monoclonal spikes  LDH was 235 units/liter  WBCs were 4,400 per cubic mm, hemoglobin 11.8 grams/dL, hematocrit 37.8%, MCV 95 and platelets 312 K.    The CBC that triggered the referral was from April 7, 2025 and it was as follows:  WBCs 6000 per cubic mm, hemoglobin 11.4 grams/dL, hematocrit 33.4%, MCV 99 and platelets 145 K.  On the same day her ferritin was 101 ng per mL while the B12 level was 158 pg per ml.    A CBC on 08/13/2024 had shown a white count of 8,800 per cubic mm, hemoglobin 13 grams/dL, hematocrit 39.5%, MCV 99 and platelets 223 K.  A CBC on 02/19/2024 had shown a white count of 3,800 per cubic mm, hemoglobin 11.3 grams/dL, hematocrit 32.5%, MCV 94 and platelets 145 K.  The 1st time mild anemia was noted was on a CBC dated 07/20/2020 that had shown a white count of 7,000 per cubic mm, hemoglobin 11 grams/dL, hematocrit 33.7%, MCV 99 and platelets 214K.  All CBCs prior to 2020 were essentially normal.  She was referred for evaluation.    Of note, she is known to have factor 5 Leiden mutation and she had been recommended lifelong anticoagulation.  She is currently on Eliquis 2.5 mg b.i.d..    ROS: Overall she feels well.  She complains primarily of fatigue which she attributes to her sleep apnea.  She is scheduled for the inspire procedure next month.  She also complains of shortness of breath with exertion and she uses O2 on a p.r.n. basis and consistently at night.   When asked, she denies any epistaxis, blood in the stool, vaginal bleeding, or blood in the " urine.  Furthermore, she denies any anxiety, depression, fevers, chills, night  sweats, weight loss, nausea, vomiting, diarrhea, constipation, diplopia, blurred vision, headache, chest pain, palpitations, shortness of breath, breast pain, abdominal pain, extremity pain, or difficulty ambulating.  The remainder of the ten-point ROS, including general, skin, lymph, H/N, cardiorespiratory, GI, , Neuro, Endocrine, and psychiatric is negative.     Physical Exam       She is alert, oriented to time, place, person, pleasant, well      nourished, in no acute physical distress.  She is here with her daughter who is a , and her .                                  VITAL SIGNS:  Reviewed.  BMI of 39 noted                                      HEENT:  Normal.  There are no nasal, oral, lip, gingival, auricular, lid,    or conjunctival lesions.  Mucosae are moist and pink, and there is no        thrush.  Pupils are equal, reactive to light and accommodation.              Extraocular muscle movements are intact.  Dentition is good.                                      NECK:  Supple without JVD, adenopathy, or thyromegaly.                       LUNGS:  Clear to auscultation without wheezing, rales, or rhonchi.           CARDIOVASCULAR:  Reveals an S1, S2, no murmurs, no rubs, no gallops.         ABDOMEN:  Soft, nontender, without organomegaly.  Bowel sounds are    present.                                                                     EXTREMITIES:  No cyanosis, clubbing, with 1+ edema below knees bilaterally.   There is evidence of chronic stasis dermatitis from venous insufficiency in both legs, left more than right  BREASTS:  Deferred                                     LYMPHATIC:  There is no cervical, axillary, or supraclavicular adenopathy.   SKIN:  Warm and moist, without petechiae, rashes, induration, but would multiple ecchymoses on both forearms from being on Eliquis.           NEUROLOGIC:  DTRs are  0-1+ bilaterally, symmetrical, motor function is 5/5,  and cranial nerves are  within normal limits.      ASSESSMENT  Mild thrombocytopenia of no clinical significance, resolved.  Obesity, BMI 39  Chronic anemia was slightly elevated MCV.  Current hematocrit is 37.8%  History of multiple DVTs, know factor 5 Leiden mutation, patient is on chronic Eliquis         PLAN  I had a very long discussion with Mrs. Avalos, her daughter, and her .  In regards to the transient thrombocytopenia, I explained to her that it is of no clinical significance and I am not inclined to recommend further workup  In regards to her anemia, I pointed out that it is borderline and has been present for at least 4 years.  We discussed the option of a bone marrow biopsy.  She opted not to pursue 1 which is not unreasonable given her chronicity of her anemia in the current H&H  At this point we will proceed as follows  We will repeat a CBC and CMP in 6 months  She will submit 3 more stool samples at the time of her next visit 6 months from now  As mentioned above, we will not pursue bone marrow biopsy but we will revisit the issue should her cytopenias worsen    Her multiple questions were answered to her satisfaction.  I spent 30 minutes reviewing the available records and evaluating the patient, out of which over 50% of the time was spent face to face with the patient in counseling and coordinating this patient's care.  Visit today included increased complexity associated with the diagnostic workup for anemia and thrombocytopenia in a 75-year-old patient with history of factor 5 Leiden mutation, on chronic Eliquis.    Route Chart for Scheduling    Med Onc Chart Routing      Follow up with physician 6 months. With 3 stool samples and lab work to be submitted 3-4 days prior   Follow up with DANIELLE    Infusion scheduling note    Injection scheduling note    Labs    Imaging    Pharmacy appointment    Other referrals                Therapy Plan  Information  DENOSUMAB (PROLIA) Q6M for Age-related osteoporosis without current pathological fracture, noted on 8/28/2013  DENOSUMAB (PROLIA) Q6M for Senile osteoporosis, noted on 3/12/2025  Medications  denosumab (PROLIA) injection 60 mg  60 mg, Subcutaneous, Every 26 weeks      No therapy plan of the specified type found.    No therapy plan of the specified type found.

## 2025-07-07 PROBLEM — S00.03XD: Status: ACTIVE | Noted: 2025-07-07

## 2025-07-07 PROBLEM — S00.03XA HEMATOMA OF OCCIPITAL REGION OF SCALP: Status: ACTIVE | Noted: 2025-07-07

## 2025-08-15 DIAGNOSIS — F33.1 MAJOR DEPRESSIVE DISORDER, RECURRENT, MODERATE: ICD-10-CM

## 2025-08-15 RX ORDER — DULOXETIN HYDROCHLORIDE 30 MG/1
30 CAPSULE, DELAYED RELEASE ORAL
Qty: 90 CAPSULE | Refills: 3 | Status: SHIPPED | OUTPATIENT
Start: 2025-08-15

## 2025-08-15 RX ORDER — DULOXETIN HYDROCHLORIDE 60 MG/1
60 CAPSULE, DELAYED RELEASE ORAL
Qty: 90 CAPSULE | Refills: 3 | Status: SHIPPED | OUTPATIENT
Start: 2025-08-15